# Patient Record
Sex: FEMALE | Race: WHITE | Employment: OTHER | RURAL
[De-identification: names, ages, dates, MRNs, and addresses within clinical notes are randomized per-mention and may not be internally consistent; named-entity substitution may affect disease eponyms.]

---

## 2017-02-21 ENCOUNTER — CLINICAL SUPPORT (OUTPATIENT)
Dept: FAMILY MEDICINE CLINIC | Age: 72
End: 2017-02-21

## 2017-02-21 DIAGNOSIS — E78.00 ELEVATED CHOLESTEROL: Primary | ICD-10-CM

## 2017-02-21 NOTE — MR AVS SNAPSHOT
Visit Information Date & Time Provider Department Dept. Phone Encounter #  
 2/21/2017 10:00 AM [facility] [phone] [id number] Your Appointments 2/21/2017 10:00 AM  
Nurse Visit with [facility] [facility] ([facility]) Appt Note: repeat chol  
 [address] [address]  
[address] [facility] [address] [address] Upcoming Health Maintenance Date Due DTaP/Tdap/Td series (1 - Tdap) 2/17/1965 BREAST CANCER SCRN MAMMOGRAM 2/17/1994 FOBT Q 1 YEAR AGE 50-75 2/17/1994 GLAUCOMA SCREENING Q2Y 2/17/2009 OSTEOPOROSIS SCREENING (DEXA) 2/17/2009 MEDICARE YEARLY EXAM 2/17/2009 Pneumococcal 65+ Low/Medium Risk (2 of 2 - PPSV23) 11/10/2017 Allergies as of 2/21/2017  Review Complete On: 2/21/2017 By: Daniel Perez Severity Noted Reaction Type Reactions Pcn [Penicillins]  02/09/2015    Unknown (comments) Current Immunizations  Reviewed on 11/10/2016 Name Date Influenza High Dose Vaccine PF 11/10/2016 Pneumococcal Conjugate (PCV-13) 11/10/2016 Not reviewed this visit You Were Diagnosed With   
  
 Codes Comments Elevated cholesterol    -  Primary ICD-10-CM: E78.00 ICD-9-CM: 272.0 Vitals OB Status Smoking Status Menopause Former Smoker Preferred Pharmacy Pharmacy Name Phone [id number] [address] [phone] Your Updated Medication List  
  
   
This list is accurate as of: 2/21/17  9:51 AM.  Always use your most recent med list.  
  
  
  
  
 ALEVE 220 mg Cap Generic drug:  naproxen sodium Take  by mouth. valACYclovir 500 mg tablet Commonly known as:  VALTREX Take 1 Tab by mouth two (2) times daily as needed. We Performed the Following LIPID PANEL [01870 CPT(R)] METABOLIC PANEL, COMPREHENSIVE [59319 CPT(R)] MT COLLECTION VENOUS BLOOD,VENIPUNCTURE [id number] CPT(R)] Introducing [facility]! Ambrocio Sanchez introduces [employer] patient portal. Now you can access parts of your medical record, email your doctor's office, and request medication refills online. 1. In your internet browser, go to [email or web address] 2. Click on the First Time User? Click Here link in the Sign In box. You will see the New Member Sign Up page. 3. Enter your [employer] Access Code exactly as it appears below. You will not need to use this code after youve completed the sign-up process. If you do not sign up before the expiration date, you must request a new code. · [employer] Access Code: [email or web address] Expires: 5/22/2017  9:42 AM 
 
4. Enter the last four digits of your Social Security Number (xxxx) and Date of Birth (mm/dd/yyyy) as indicated and click Submit. You will be taken to the next sign-up page. 5. Create a [employer] ID. This will be your [employer] login ID and cannot be changed, so think of one that is secure and easy to remember. 6. Create a [employer] password. You can change your password at any time. 7. Enter your Password Reset Question and Answer. This can be used at a later time if you forget your password. 8. Enter your e-mail address. You will receive e-mail notification when new information is available in [address] 9. Click Sign Up. You can now view and download portions of your medical record. 10. Click the Download Summary menu link to download a portable copy of your medical information. If you have questions, please visit the Frequently Asked Questions section of the [employer] website. Remember, [employer] is NOT to be used for urgent needs. For medical emergencies, dial 911. Now available from your iPhone and Android! Please provide this summary of care documentation to your next provider. Your primary care clinician is listed as Raphael Koch. If you have any questions after today's visit, please call [phone].

## 2017-02-22 LAB
ALBUMIN SERPL-MCNC: 4.6 G/DL (ref 3.5–4.8)
ALBUMIN/GLOB SERPL: 1.9 {RATIO} (ref 1.1–2.5)
ALP SERPL-CCNC: 75 IU/L (ref 39–117)
ALT SERPL-CCNC: 25 IU/L (ref 0–32)
AST SERPL-CCNC: 29 IU/L (ref 0–40)
BILIRUB SERPL-MCNC: 0.7 MG/DL (ref 0–1.2)
BUN SERPL-MCNC: 15 MG/DL (ref 8–27)
BUN/CREAT SERPL: 21 (ref 11–26)
CALCIUM SERPL-MCNC: 9.2 MG/DL (ref 8.7–10.3)
CHLORIDE SERPL-SCNC: 102 MMOL/L (ref 96–106)
CHOLEST SERPL-MCNC: 266 MG/DL (ref 100–199)
CO2 SERPL-SCNC: 25 MMOL/L (ref 18–29)
CREAT SERPL-MCNC: 0.7 MG/DL (ref 0.57–1)
GLOBULIN SER CALC-MCNC: 2.4 G/DL (ref 1.5–4.5)
GLUCOSE SERPL-MCNC: 92 MG/DL (ref 65–99)
HDLC SERPL-MCNC: 110 MG/DL
LDLC SERPL CALC-MCNC: 140 MG/DL (ref 0–99)
POTASSIUM SERPL-SCNC: 5.1 MMOL/L (ref 3.5–5.2)
PROT SERPL-MCNC: 7 G/DL (ref 6–8.5)
SODIUM SERPL-SCNC: 144 MMOL/L (ref 134–144)
TRIGL SERPL-MCNC: 80 MG/DL (ref 0–149)
VLDLC SERPL CALC-MCNC: 16 MG/DL (ref 5–40)

## 2017-03-06 ENCOUNTER — TELEPHONE (OUTPATIENT)
Dept: FAMILY MEDICINE CLINIC | Age: 72
End: 2017-03-06

## 2017-03-07 ENCOUNTER — OFFICE VISIT (OUTPATIENT)
Dept: FAMILY MEDICINE CLINIC | Age: 72
End: 2017-03-07

## 2017-03-07 VITALS
HEART RATE: 105 BPM | TEMPERATURE: 97.6 F | HEIGHT: 62 IN | DIASTOLIC BLOOD PRESSURE: 70 MMHG | WEIGHT: 123 LBS | BODY MASS INDEX: 22.63 KG/M2 | OXYGEN SATURATION: 96 % | SYSTOLIC BLOOD PRESSURE: 110 MMHG | RESPIRATION RATE: 18 BRPM

## 2017-03-07 DIAGNOSIS — J30.89 ENVIRONMENTAL AND SEASONAL ALLERGIES: ICD-10-CM

## 2017-03-07 DIAGNOSIS — J06.9 VIRAL UPPER RESPIRATORY TRACT INFECTION: Primary | ICD-10-CM

## 2017-03-07 DIAGNOSIS — R09.82 PND (POST-NASAL DRIP): ICD-10-CM

## 2017-03-07 RX ORDER — FLUTICASONE PROPIONATE 50 MCG
SPRAY, SUSPENSION (ML) NASAL
Qty: 1 BOTTLE | Refills: 5 | Status: SHIPPED | OUTPATIENT
Start: 2017-03-07

## 2017-03-07 RX ORDER — BENZONATATE 200 MG/1
200 CAPSULE ORAL
Qty: 30 CAP | Refills: 3 | Status: SHIPPED | OUTPATIENT
Start: 2017-03-07 | End: 2017-03-14

## 2017-03-07 RX ORDER — LORATADINE 10 MG/1
10 TABLET ORAL DAILY
Qty: 30 TAB | Refills: 4 | Status: SHIPPED | OUTPATIENT
Start: 2017-03-07 | End: 2022-05-27

## 2017-03-07 NOTE — PATIENT INSTRUCTIONS
Allergies: Care Instructions  Your Care Instructions  Allergies occur when your body's defense system (immune system) overreacts to certain substances. The immune system treats a harmless substance as if it were a harmful germ or virus. Many things can cause this overreaction, including pollens, medicine, food, dust, animal dander, and mold. Allergies can be mild or severe. Mild allergies can be managed with home treatment. But medicine may be needed to prevent problems. Managing your allergies is an important part of staying healthy. Your doctor may suggest that you have allergy testing to help find out what is causing your allergies. When you know what things trigger your symptoms, you can avoid them. This can prevent allergy symptoms and other health problems. For severe allergies that cause reactions that affect your whole body (anaphylactic reactions), your doctor may prescribe a shot of epinephrine to carry with you in case you have a severe reaction. Learn how to give yourself the shot and keep it with you at all times. Make sure it is not . Follow-up care is a key part of your treatment and safety. Be sure to make and go to all appointments, and call your doctor if you are having problems. It's also a good idea to know your test results and keep a list of the medicines you take. How can you care for yourself at home? · If you have been told by your doctor that dust or dust mites are causing your allergy, decrease the dust around your bed:  [facility] sheets, pillowcases, and other bedding in hot water every week. ¨ Use dust-proof covers for pillows, duvets, and mattresses. Avoid plastic covers because they tear easily and do not \"breathe. \" Wash as instructed on the label. ¨ Do not use any blankets and pillows that you do not need. ¨ Use blankets that you can wash in your washing machine. ¨ Consider removing drapes and carpets, which attract and hold dust, from your bedroom.   · If you are allergic to house dust and mites, do not use home humidifiers. Your doctor can suggest ways you can control dust and mites. · Look for signs of cockroaches. Cockroaches cause allergic reactions. Use cockroach baits to get rid of them. Then, clean your home well. Cockroaches like areas where grocery bags, newspapers, empty bottles, or cardboard boxes are stored. Do not keep these inside your home, and keep trash and food containers sealed. Seal off any spots where cockroaches might enter your home. · If you are allergic to mold, get rid of furniture, rugs, and drapes that smell musty. Check for mold in the bathroom. · If you are allergic to outdoor pollen or mold spores, use air-conditioning. Change or clean all filters every month. Keep windows closed. · If you are allergic to pollen, stay inside when pollen counts are high. Use a vacuum  with a HEPA filter or a double-thickness filter at least two times each week. · Stay inside when air pollution is bad. Avoid paint fumes, perfumes, and other strong odors. · Avoid conditions that make your allergies worse. Stay away from smoke. Do not smoke or let anyone else smoke in your house. Do not use fireplaces or wood-burning stoves. · If you are allergic to your pets, change the air filter in your furnace every month. Use high-efficiency filters. · If you are allergic to pet dander, keep pets outside or out of your bedroom. Old carpet and cloth furniture can hold a lot of animal dander. You may need to replace them. When should you call for help? Give an epinephrine shot if:  · You think you are having a severe allergic reaction. · You have symptoms in more than one body area, such as mild nausea and an itchy mouth. After giving an epinephrine shot call 911, even if you feel better. Call 911 if:  · You have symptoms of a severe allergic reaction. These may include:  ¨ Sudden raised, red areas (hives) all over your body.   ¨ Swelling of the throat, mouth, lips, or tongue. ¨ Trouble breathing. ¨ Passing out (losing consciousness). Or you may feel very lightheaded or suddenly feel weak, confused, or restless. · You have been given an epinephrine shot, even if you feel better. Call your doctor now or seek immediate medical care if:  · You have symptoms of an allergic reaction, such as:  ¨ A rash or hives (raised, red areas on the skin). ¨ Itching. ¨ Swelling. ¨ Belly pain, nausea, or vomiting. Watch closely for changes in your health, and be sure to contact your doctor if:  · You do not get better as expected. Where can you learn more? Go to http://darian-parag.info/. Enter R197 in the search box to learn more about \"Allergies: Care Instructions. \"  Current as of: February 12, 2016  Content Version: 11.1  © 2731-1974 [employer]. Care instructions adapted under license by [employer] (which disclaims liability or warranty for this information). If you have questions about a medical condition or this instruction, always ask your healthcare professional. [place] [address] any warranty or liability for your use of this information. Using a Nasal Steroid Spray: Care Instructions  Your Care Instructions    Your doctor may suggest using a corticosteroid nasal spray for your allergy symptoms or sinus problems. These sprays reduce the swelling inside the nose and sinuses. Unlike decongestant nasal sprays, steroid sprays won't lead to more swelling when you stop taking them. These sprays start working in a few days, but it may take several weeks before you get the full effect. Most side effects are minor. The most common complaint is a burning feeling in the nose right after the spray is used. Some people get nosebleeds. Follow-up care is a key part of your treatment and safety. Be sure to make and go to all appointments, and call your doctor if you are having problems.  It's also a good idea to know your test results and keep a list of the medicines you take. How can you care for yourself at home? Here are some tips for using these sprays:  · You may need to prime the sprayer before you use it. This means spraying it into the air a few times to make sure you get the right amount of medicine. Follow the directions on the label. · Blow your nose before you spray. This will help clear out your nostrils. · Gently sniff the medicine into your nose as you spray. Don't snort, or the medicine will go all the way into your throat where it won't do much good. · Aim the nozzle straight toward the back of your nose. This will help keep the medicine from irritating the inside walls of your nostril, especially your septum (the wall that separates your left and right nostrils). · Don't blow your nose for 10 minutes or so after you spray. And try not to sneeze. · Be safe with medicines. Use this medicine exactly as prescribed. Call your doctor if you think you are having a problem with your medicine. · Clean your sprayer once a week. Read the label to learn how. When should you call for help? Call your doctor now or seek immediate medical care if:  · You don't understand how to use the medicine. · Your symptoms aren't getting better as expected. · You think you are having a side effect from the medicine. Where can you learn more? Go to http://darian-parag.info/. Enter Z970 in the search box to learn more about \"Using a Nasal Steroid Spray: Care Instructions. \"  Current as of: July 29, 2016  Content Version: 11.1  © 0262-0901 [employer]. Care instructions adapted under license by [employer] (which disclaims liability or warranty for this information). If you have questions about a medical condition or this instruction, always ask your healthcare professional. [place] [address] any warranty or liability for your use of this information.        Upper Respiratory Infection (Cold): Care Instructions  Your Care Instructions    An upper respiratory infection, or URI, is an infection of the nose, sinuses, or throat. URIs are spread by coughs, sneezes, and direct contact. The common cold is the most frequent kind of URI. The flu and sinus infections are other kinds of URIs. Almost all URIs are caused by viruses. Antibiotics won't cure them. But you can treat most infections with home care. This may include drinking lots of fluids and taking over-the-counter pain medicine. You will probably feel better in 4 to 10 days. The doctor has checked you carefully, but problems can develop later. If you notice any problems or new symptoms, get medical treatment right away. Follow-up care is a key part of your treatment and safety. Be sure to make and go to all appointments, and call your doctor if you are having problems. It's also a good idea to know your test results and keep a list of the medicines you take. How can you care for yourself at home? · To prevent dehydration, drink plenty of fluids, enough so that your urine is light yellow or clear like water. Choose water and other caffeine-free clear liquids until you feel better. If you have kidney, heart, or liver disease and have to limit fluids, talk with your doctor before you increase the amount of fluids you drink. · Take an over-the-counter pain medicine, such as acetaminophen (Tylenol), ibuprofen (Advil, Motrin), or naproxen (Aleve). Read and follow all instructions on the label. · Before you use cough and cold medicines, check the label. These medicines may not be safe for young children or for people with certain health problems. · Be careful when taking over-the-counter cold or flu medicines and Tylenol at the same time. Many of these medicines have acetaminophen, which is Tylenol. Read the labels to make sure that you are not taking more than the recommended dose.  Too much acetaminophen (Tylenol) can be harmful. · Get plenty of rest.  · Do not smoke or allow others to smoke around you. If you need help quitting, talk to your doctor about stop-smoking programs and medicines. These can increase your chances of quitting for good. When should you call for help? Call 911 anytime you think you may need emergency care. For example, call if:  · You have severe trouble breathing. Call your doctor now or seek immediate medical care if:  · You seem to be getting much sicker. · You have new or worse trouble breathing. · You have a new or higher fever. · You have a new rash. Watch closely for changes in your health, and be sure to contact your doctor if:  · You have a new symptom, such as a sore throat, an earache, or sinus pain. · You cough more deeply or more often, especially if you notice more mucus or a change in the color of your mucus. · You do not get better as expected. Where can you learn more? Go to http://darian-parag.info/. Enter B601 in the search box to learn more about \"Upper Respiratory Infection (Cold): Care Instructions. \"  Current as of: June 30, 2016  Content Version: 11.1  © 6215-5652 [employer]. Care instructions adapted under license by [employer] (which disclaims liability or warranty for this information). If you have questions about a medical condition or this instruction, always ask your healthcare professional. [place] [address] any warranty or liability for your use of this information. Cholesterol and Triglycerides Tests: About These Tests  What are they? Cholesterol and triglycerides tests measure the amount of fats in your blood. These fats have both \"good\" (HDL) and \"bad\" (LDL) cholesterol. Why are these tests done? These tests are done to help find out your risk of a heart attack and stroke. They can help your doctor find out how well medicine is working for some health problems.   How can you prepare for these tests?  · Your doctor may tell you to fast before your tests. This means that you do not eat or drink anything except water for 9 to 12 hours before the tests. In most cases, you can take your medicines with water the morning of the test.  · Do not eat high-fat foods the night before the tests. · Do not drink alcohol or do intense exercise the night before the tests. · Be sure to tell your doctor about all the over-the-counter and prescription medicines and herbs or other supplements you take. They can affect the results of these tests. What happens during these tests? A health professional takes a sample of your blood. What else should you know about these tests? Your cholesterol levels can help your doctor find out your risk for having a heart attack or stroke. But it's not just about your cholesterol. Your doctor uses your cholesterol levels plus other things to calculate your risk. These include:  · Your blood pressure. · Whether or not you have diabetes. · Your age, sex, and race. · Whether or not you smoke. You and your doctor can talk about whether you need to lower your risk and what treatment is best for you. Where can you learn more? Go to http://darian-parag.info/. Enter Z294 in the search box to learn more about \"Cholesterol and Triglycerides Tests: About These Tests. \"  Current as of: June 30, 2016  Content Version: 11.1  © 1222-3820 [employer]. Care instructions adapted under license by [employer] (which disclaims liability or warranty for this information). If you have questions about a medical condition or this instruction, always ask your healthcare professional. [place] [address] any warranty or liability for your use of this information. Low HDL Cholesterol: After Your Visit  Your Care Instructions  Cholesterol is a type of fat in your blood. It is needed for many body functions, such as making new cells.  Cholesterol is made by your body and also comes from food you eat. HDL (high-density lipoprotein) is the \"good\" cholesterol. Low levels of HDL can increase your risk of having a heart attack or stroke. It is best if your HDL level is at least 40 (measured in milligrams per deciliter, or mg/dL). Low HDL usually is caused by a poor diet and a lack of exercise. You may raise your HDL level by eating less animal fat and more vegetables. Getting regular exercise can also help. But for some people, low HDL runs in the family. If changes in diet and exercise do not raise your HDL level, your doctor may recommend medicine. Follow-up care is a key part of your treatment and safety. Be sure to make and go to all appointments, and call your doctor if you are having problems. Its also a good idea to know your test results and keep a list of the medicines you take. How can you care for yourself at home? · Eat a healthy diet. Read food labels and try to avoid saturated fat and trans fat. ¨ Limit the amount of fatty meat and milk products you eat. Choose low-fat meats, such as skinless chicken breasts, and low-fat or nonfat dairy products. ¨ Bake, broil, grill, or steam foods instead of frying them. Avoid fried foods. ¨ Eat foods with whole grains, such as whole wheat bread, instead of white bread. Eat brown rice instead of white rice. ¨ Try not to eat liver and eggs. They contain a lot of \"bad\" cholesterol. Dianne Beagle with oils such as olive, canola, or peanut oil. ¨ Eat beans and peas for protein. · Try to lose weight if you are overweight. · Get regular exercise. Even mild regular exercise alone may increase your HDL level. Walking is a good choice. Bit by bit, increase the amount you walk every day. Try for at least 30 minutes on most days of the week. You also may want to swim, bike, or do other activities. · Stop smoking, which can lower your HDL level.  If you need help quitting, talk to your doctor about stop-smoking programs and medicines. These can increase your chances of quitting for good. · Take your medicines exactly as prescribed. Call your doctor if you think you are having a problem with your medicine. When should you call for help? Call 911 anytime you think you may need emergency care. For example, call if:  · You have signs of a stroke. These may include:  ¨ Sudden numbness, paralysis, or weakness in your face, arm, or leg, especially on only one side of your body. ¨ New problems with walking or balance. ¨ Sudden vision changes. ¨ Drooling or slurred speech. ¨ New problems speaking or understanding simple statements, or feeling confused. ¨ A sudden, severe headache that is different from past headaches. · You have symptoms of a heart attack. These may include:  ¨ Chest pain or pressure, or a strange feeling in the chest.  ¨ Sweating. ¨ Shortness of breath. ¨ Nausea or vomiting. ¨ Pain, pressure, or a strange feeling in the back, neck, jaw, or upper belly or in one or both shoulders or arms. ¨ Lightheadedness or sudden weakness. ¨ A fast or irregular heartbeat. After you call 911, the  may tell you to chew 1 adult-strength or 2 to 4 low-dose aspirin. Wait for an ambulance. Do not try to drive yourself. Watch closely for changes in your health, and be sure to contact your doctor if:  · Your HDL level does not increase after making diet and exercise changes. · You are worried about your cholesterol level. · You do not get better as expected. Where can you learn more? Go to [email or web address]  Enter L121 in the search box to learn more about \"Low HDL Cholesterol: After Your Visit. \"   © 2857-5111 Healthwise, Incorporated. Care instructions adapted under license by [facility] [employer] (which disclaims liability or warranty for this information).  This care instruction is for use with your licensed healthcare professional. If you have questions about a medical condition or this instruction, always ask your healthcare professional. [place] [address] any warranty or liability for your use of this information. Content Version: 8.8.648345; Last Revised: October 13, 2011              Hyperlipidemia: After Your Visit  Your Care Instructions  Hyperlipidemia is too much fat in your blood. The body has several kinds of fat, including cholesterol and triglycerides. Your body needs fat for many things, such as making new cells. But too much fat in your blood increases your chances of having a heart attack or stroke. You may be able to lower your cholesterol and triglycerides with a heart-healthy diet, exercise, and if needed, medicine. Your doctor may want you to try lifestyle changes first to see whether they lower the fat in your blood. You may need to take medicine if lifestyle changes do not lower the fat in your blood enough. Follow-up care is a key part of your treatment and safety. Be sure to make and go to all appointments, and call your doctor if you are having problems. Its also a good idea to know your test results and keep a list of the medicines you take. How can you care for yourself at home? Take your medicines  · Take your medicines exactly as prescribed. Call your doctor if you think you are having a problem with your medicine. · If you take medicine to lower your cholesterol, go to follow-up visits. You will need to have blood tests. · Do not take large doses of niacin, which is a B vitamin, while taking medicine called statins. It may increase the chance of muscle pain and liver problems. · Talk to your doctor about avoiding grapefruit juice if you are taking statins. Grapefruit juice can raise the level of this medicine in your blood. This could increase side effects. Eat more fruits, vegetables, and fiber  · Fruits and vegetables have lots of nutrients that help protect against heart disease, and they have littleif anyfat. Try to eat at least five servings a day. Dark green, deep orange, or yellow fruits and vegetables are healthy choices. · Keep carrots, celery, and other veggies handy for snacks. Buy fruit that is in season and store it where you can see it so that you will be tempted to eat it. Cook dishes that have a lot of veggies in them, such as stir-fries and soups. · Foods high in fiber may reduce your cholesterol and provide important vitamins and minerals. High-fiber foods include whole-grain cereals and breads, oatmeal, beans, brown rice, citrus fruits, and apples. · Buy whole-grain breads and cereals instead of white bread and pastries. Limit saturated fat  · Read food labels and try to avoid saturated fat and trans fat. They increase your risk of heart disease. · Use olive or canola oil when you cook. Try cholesterol-lowering spreads, such as Benecol or Take Control. · Bake, broil, grill, or steam foods instead of frying them. · Limit the amount of high-fat meats you eat, including hot dogs and sausages. Cut out all visible fat when you prepare meat. · Eat fish, skinless poultry, and soy products such as tofu instead of high-fat meats. Soybeans may be especially good for your heart. Eat at least two servings of fish a week. Certain fish, such as salmon, contain omega-3 fatty acids, which may help reduce your risk of heart attack. · Choose low-fat or fat-free milk and dairy products. Get exercise, limit alcohol, and quit smoking  · Get more exercise. Work with your doctor to set up an exercise program. Even if you can do only a small amount, exercise will help you get stronger, have more energy, and manage your weight and your stress. Walking is an easy way to get exercise. Gradually increase the amount you walk every day. Aim for at least 30 minutes on most days of the week. You also may want to swim, bike, or do other activities. · Limit alcohol to no more than 2 drinks a day for men and 1 drink a day for women. · Do not smoke.  If you need help quitting, talk to your doctor about stop-smoking programs and medicines. These can increase your chances of quitting for good. When should you call for help? Call 911 anytime you think you may need emergency care. For example, call if:  · You have symptoms of a heart attack. These may include:  ¨ Chest pain or pressure, or a strange feeling in the chest.  ¨ Sweating. ¨ Shortness of breath. ¨ Nausea or vomiting. ¨ Pain, pressure, or a strange feeling in the back, neck, jaw, or upper belly or in one or both shoulders or arms. ¨ Lightheadedness or sudden weakness. ¨ A fast or irregular heartbeat. After you call 911, the  may tell you to chew 1 adult-strength or 2 to 4 low-dose aspirin. Wait for an ambulance. Do not try to drive yourself. · You have signs of a stroke. These may include:  ¨ Sudden numbness, paralysis, or weakness in your face, arm, or leg, especially on only one side of your body. ¨ New problems with walking or balance. ¨ Sudden vision changes. ¨ Drooling or slurred speech. ¨ New problems speaking or understanding simple statements, or feeling confused. ¨ A sudden, severe headache that is different from past headaches. · You passed out (lost consciousness). Call your doctor now or seek immediate medical care if:  · You have muscle pain or weakness. Watch closely for changes in your health, and be sure to contact your doctor if:  · You are very tired. · You have an upset stomach, gas, constipation, or belly pain or cramps. Where can you learn more? Go to [email or web address]  Enter C406 in the search box to learn more about \"Hyperlipidemia: After Your Visit. \"   © 1184-3905 Healthwise, Incorporated. Care instructions adapted under license by [employer] (which disclaims liability or warranty for this information).  This care instruction is for use with your licensed healthcare professional. If you have questions about a medical condition or this instruction, always ask your healthcare professional. [address] any warranty or liability for your use of this information. Content Version: 7.2.012644; Last Revised: October 13, 2011                 Learning About High Cholesterol  What is high cholesterol? Cholesterol is a type of fat in your blood. It is needed for many body functions, such as making new cells. Cholesterol is made by your body. It also comes from food you eat. If you have too much cholesterol, it starts to build up in your arteries. This is called hardening of the arteries, or atherosclerosis. High cholesterol raises your risk of a heart attack and stroke. There are different types of cholesterol. LDL is the \"bad\" cholesterol. High LDL can raise your risk for heart disease, heart attack, and stroke. HDL is the \"good\" cholesterol. High HDL is linked with a lower risk for heart disease, heart attack, and stroke. Your cholesterol levels help your doctor find out your risk for having a heart attack or stroke. How can you prevent high cholesterol? A heart-healthy lifestyle can help you prevent high cholesterol. This lifestyle helps lower your risk for a heart attack and stroke. · Eat heart-healthy foods. ¨ Eat fruits, vegetables, whole grains (like oatmeal), dried beans and peas, nuts and seeds, soy products (like tofu), and fat-free or low-fat dairy products. ¨ Replace butter, margarine, and hydrogenated or partially hydrogenated oils with olive and canola oils. (Canola oil margarine without trans fat is fine.)  ¨ Replace red meat with fish, poultry, and soy protein (like tofu). ¨ Limit processed and packaged foods like chips, crackers, and cookies. · Be active. Exercise can improve your cholesterol level. Get at least 30 minutes of exercise on most days of the week. Walking is a good choice. You also may want to do other activities, such as running, swimming, cycling, or playing tennis or team sports.   · Stay at a healthy weight. Lose weight if you need to. · Don't smoke. If you need help quitting, talk to your doctor about stop-smoking programs and medicines. These can increase your chances of quitting for good. How is high cholesterol treated? The goal of treatment is to reduce your chances of having a heart attack or stroke. The goal is not to lower your cholesterol numbers only. · You may make lifestyle changes, such as eating healthy foods, not smoking, losing weight, and being more active. · You may have to take medicine. Follow-up care is a key part of your treatment and safety. Be sure to make and go to all appointments, and call your doctor if you are having problems. It's also a good idea to know your test results and keep a list of the medicines you take. Where can you learn more? Go to http://darian-parag.info/. Enter R818 in the search box to learn more about \"Learning About High Cholesterol. \"  Current as of: January 27, 2016  Content Version: 11.1  © 9598-0943 [employer]. Care instructions adapted under license by [employer] (which disclaims liability or warranty for this information). If you have questions about a medical condition or this instruction, always ask your healthcare professional. [place] [address] any warranty or liability for your use of this information.

## 2017-03-07 NOTE — MR AVS SNAPSHOT
Visit Information Date & Time Provider Department Dept. Phone Encounter #  
 3/7/2017 10:30 AM Isadora Astudillo NP [address] [phone] [id number] Follow-up Instructions Return if symptoms worsen or fail to improve. Upcoming Health Maintenance Date Due DTaP/Tdap/Td series (1 - Tdap) 2/17/1965 BREAST CANCER SCRN MAMMOGRAM 2/17/1994 FOBT Q 1 YEAR AGE 50-75 2/17/1994 GLAUCOMA SCREENING Q2Y 2/17/2009 OSTEOPOROSIS SCREENING (DEXA) 2/17/2009 MEDICARE YEARLY EXAM 2/17/2009 Pneumococcal 65+ Low/Medium Risk (2 of 2 - PPSV23) 11/10/2017 Allergies as of 3/7/2017  Review Complete On: 3/7/2017 By: Isadora Astudillo NP Severity Noted Reaction Type Reactions Pcn [Penicillins]  02/09/2015    Unknown (comments) Current Immunizations  Reviewed on 11/10/2016 Name Date Influenza High Dose Vaccine PF 11/10/2016 Pneumococcal Conjugate (PCV-13) 11/10/2016 Not reviewed this visit You Were Diagnosed With   
  
 Codes Comments Viral upper respiratory tract infection    -  Primary ICD-10-CM: J06.9, B97.89 ICD-9-CM: 465.9 PND (post-nasal drip)     ICD-10-CM: R09.82 ICD-9-CM: 784.91 Environmental and seasonal allergies     ICD-10-CM: J30.89 ICD-9-CM: 477.8 Vitals BP Pulse Temp Resp Height(growth percentile) 110/70 (BP 1 Location: Left arm, BP Patient Position: Sitting) (!) 105 97.6 °F (36.4 °C) (Temporal) 18 5' 2\" (1.575 m) Weight(growth percentile) SpO2 BMI OB Status Smoking Status 123 lb (55.8 kg) 96% 22.5 kg/m2 Menopause Former Smoker BMI and BSA Data Body Mass Index Body Surface Area  
 22.5 kg/m 2 1.56 m 2 Preferred Pharmacy Pharmacy Name Phone [id number], [address] [place] [phone] Your Updated Medication List  
  
   
This list is accurate as of: 3/7/17 10:56 AM.  Always use your most recent med list.  
  
  
  
  
 ALEVE 220 mg Cap Generic drug:  naproxen sodium Take  by mouth.  
  
 benzonatate 200 mg capsule Commonly known as:  TESSALON Take 1 Cap by mouth three (3) times daily as needed for Cough for up to 7 days. fluticasone 50 mcg/actuation nasal spray Commonly known as:  Oksana Fryer One spray per nostril twice a day  Indications: ALLERGIC RHINITIS  
  
 loratadine 10 mg tablet Commonly known as:  Aundrea Dustman Take 1 Tab by mouth daily. Indications: ALLERGIC RHINITIS  
  
 valACYclovir 500 mg tablet Commonly known as:  VALTREX Take 1 Tab by mouth two (2) times daily as needed. Prescriptions Sent to Pharmacy Refills  
 loratadine (CLARITIN) 10 mg tablet 4 Sig: Take 1 Tab by mouth daily. Indications: ALLERGIC RHINITIS Class: Normal  
 Pharmacy: [facility][id number] [place], [address] Ph #: [phone] Route: Oral  
 fluticasone (FLONASE) 50 mcg/actuation nasal spray 5 Sig: One spray per nostril twice a day  Indications: ALLERGIC RHINITIS Class: Normal  
 Pharmacy: [facility]/pharmacy #[id number] - [place] - [address] Ph #: [phone]  
 benzonatate (TESSALON) 200 mg capsule 3 Sig: Take 1 Cap by mouth three (3) times daily as needed for Cough for up to 7 days. Class: Normal  
 Pharmacy: [facility] #[id number] [place], [address] Ph #: [phone] Route: Oral  
  
Follow-up Instructions Return if symptoms worsen or fail to improve. Patient Instructions Allergies: Care Instructions Your Care Instructions Allergies occur when your body's defense system (immune system) overreacts to certain substances. The immune system treats a harmless substance as if it were a harmful germ or virus. Many things can cause this overreaction, including pollens, medicine, food, dust, animal dander, and mold. Allergies can be mild or severe.  Mild allergies can be managed with home treatment. But medicine may be needed to prevent problems. Managing your allergies is an important part of staying healthy. Your doctor may suggest that you have allergy testing to help find out what is causing your allergies. When you know what things trigger your symptoms, you can avoid them. This can prevent allergy symptoms and other health problems. For severe allergies that cause reactions that affect your whole body (anaphylactic reactions), your doctor may prescribe a shot of epinephrine to carry with you in case you have a severe reaction. Learn how to give yourself the shot and keep it with you at all times. Make sure it is not . Follow-up care is a key part of your treatment and safety. Be sure to make and go to all appointments, and call your doctor if you are having problems. It's also a good idea to know your test results and keep a list of the medicines you take. How can you care for yourself at home? · If you have been told by your doctor that dust or dust mites are causing your allergy, decrease the dust around your bed: 
[facility] [employer] sheets, pillowcases, and other bedding in hot water every week. ¨ Use dust-proof covers for pillows, duvets, and mattresses. Avoid plastic covers because they tear easily and do not \"breathe. \" Wash as instructed on the label. ¨ Do not use any blankets and pillows that you do not need. ¨ Use blankets that you can wash in your washing machine. ¨ Consider removing drapes and carpets, which attract and hold dust, from your bedroom. · If you are allergic to house dust and mites, do not use home humidifiers. Your doctor can suggest ways you can control dust and mites. · Look for signs of cockroaches. Cockroaches cause allergic reactions. Use cockroach baits to get rid of them. Then, clean your home well. Cockroaches like areas where grocery bags, newspapers, empty bottles, or cardboard boxes are stored.  Do not keep these inside your home, and keep trash and food containers sealed. Seal off any spots where cockroaches might enter your home. · If you are allergic to mold, get rid of furniture, rugs, and drapes that smell musty. Check for mold in the bathroom. · If you are allergic to outdoor pollen or mold spores, use air-conditioning. Change or clean all filters every month. Keep windows closed. · If you are allergic to pollen, stay inside when pollen counts are high. Use a vacuum  with a HEPA filter or a double-thickness filter at least two times each week. · Stay inside when air pollution is bad. Avoid paint fumes, perfumes, and other strong odors. · Avoid conditions that make your allergies worse. Stay away from smoke. Do not smoke or let anyone else smoke in your house. Do not use fireplaces or wood-burning stoves. · If you are allergic to your pets, change the air filter in your furnace every month. Use high-efficiency filters. · If you are allergic to pet dander, keep pets outside or out of your bedroom. Old carpet and cloth furniture can hold a lot of animal dander. You may need to replace them. When should you call for help? Give an epinephrine shot if: 
· You think you are having a severe allergic reaction. · You have symptoms in more than one body area, such as mild nausea and an itchy mouth. After giving an epinephrine shot call 911, even if you feel better. Call 911 if: 
· You have symptoms of a severe allergic reaction. These may include: 
¨ Sudden raised, red areas (hives) all over your body. ¨ Swelling of the throat, mouth, lips, or tongue. ¨ Trouble breathing. ¨ Passing out (losing consciousness). Or you may feel very lightheaded or suddenly feel weak, confused, or restless. · You have been given an epinephrine shot, even if you feel better. Call your doctor now or seek immediate medical care if: 
· You have symptoms of an allergic reaction, such as: ¨ A rash or hives (raised, red areas on the skin). ¨ Itching. ¨ Swelling. ¨ Belly pain, nausea, or vomiting. Watch closely for changes in your health, and be sure to contact your doctor if: 
· You do not get better as expected. Where can you learn more? Go to http://darian-parag.info/. Enter W157 in the search box to learn more about \"Allergies: Care Instructions. \" Current as of: February 12, 2016 Content Version: 11.1 © 6330-3291 SeeYourImpact.org. Care instructions adapted under license by [employer]wi (which disclaims liability or warranty for this information). If you have questions about a medical condition or this instruction, always ask your healthcare professional. [address] any warranty or liability for your use of this information. Using a Nasal Steroid Spray: Care Instructions Your Care Instructions Your doctor may suggest using a corticosteroid nasal spray for your allergy symptoms or sinus problems. These sprays reduce the swelling inside the nose and sinuses. Unlike decongestant nasal sprays, steroid sprays won't lead to more swelling when you stop taking them. These sprays start working in a few days, but it may take several weeks before you get the full effect. Most side effects are minor. The most common complaint is a burning feeling in the nose right after the spray is used. Some people get nosebleeds. Follow-up care is a key part of your treatment and safety. Be sure to make and go to all appointments, and call your doctor if you are having problems. It's also a good idea to know your test results and keep a list of the medicines you take. How can you care for yourself at home? Here are some tips for using these sprays: 
· You may need to prime the sprayer before you use it. This means spraying it into the air a few times to make sure you get the right amount of medicine. Follow the directions on the label. · Blow your nose before you spray. This will help clear out your nostrils. · Gently sniff the medicine into your nose as you spray. Don't snort, or the medicine will go all the way into your throat where it won't do much good. · Aim the nozzle straight toward the back of your nose. This will help keep the medicine from irritating the inside walls of your nostril, especially your septum (the wall that separates your left and right nostrils). · Don't blow your nose for 10 minutes or so after you spray. And try not to sneeze. · Be safe with medicines. Use this medicine exactly as prescribed. Call your doctor if you think you are having a problem with your medicine. · Clean your sprayer once a week. Read the label to learn how. When should you call for help? Call your doctor now or seek immediate medical care if: 
· You don't understand how to use the medicine. · Your symptoms aren't getting better as expected. · You think you are having a side effect from the medicine. Where can you learn more? Go to http://darian-parag.info/. Enter M742 in the search box to learn more about \"Using a Nasal Steroid Spray: Care Instructions. \" Current as of: July 29, 2016 Content Version: 11.1 © 3230-9920 Vir-Sec. Care instructions adapted under license by [employer] (which disclaims liability or warranty for this information). If you have questions about a medical condition or this instruction, always ask your healthcare professional. [place] [address] any warranty or liability for your use of this information. Upper Respiratory Infection (Cold): Care Instructions Your Care Instructions An upper respiratory infection, or URI, is an infection of the nose, sinuses, or throat. URIs are spread by coughs, sneezes, and direct contact. The common cold is the most frequent kind of URI. The flu and sinus infections are other kinds of URIs. Almost all URIs are caused by viruses. Antibiotics won't cure them. But you can treat most infections with home care. This may include drinking lots of fluids and taking over-the-counter pain medicine. You will probably feel better in 4 to 10 days. The doctor has checked you carefully, but problems can develop later. If you notice any problems or new symptoms, get medical treatment right away. Follow-up care is a key part of your treatment and safety. Be sure to make and go to all appointments, and call your doctor if you are having problems. It's also a good idea to know your test results and keep a list of the medicines you take. How can you care for yourself at home? · To prevent dehydration, drink plenty of fluids, enough so that your urine is light yellow or clear like water. Choose water and other caffeine-free clear liquids until you feel better. If you have kidney, heart, or liver disease and have to limit fluids, talk with your doctor before you increase the amount of fluids you drink. · Take an over-the-counter pain medicine, such as acetaminophen (Tylenol), ibuprofen (Advil, Motrin), or naproxen (Aleve). Read and follow all instructions on the label. · Before you use cough and cold medicines, check the label. These medicines may not be safe for young children or for people with certain health problems. · Be careful when taking over-the-counter cold or flu medicines and Tylenol at the same time. Many of these medicines have acetaminophen, which is Tylenol. Read the labels to make sure that you are not taking more than the recommended dose. Too much acetaminophen (Tylenol) can be harmful. · Get plenty of rest. 
· Do not smoke or allow others to smoke around you. If you need help quitting, talk to your doctor about stop-smoking programs and medicines. These can increase your chances of quitting for good. When should you call for help? Call 911 anytime you think you may need emergency care. For example, call if: 
· You have severe trouble breathing. Call your doctor now or seek immediate medical care if: 
· You seem to be getting much sicker. · You have new or worse trouble breathing. · You have a new or higher fever. · You have a new rash. Watch closely for changes in your health, and be sure to contact your doctor if: 
· You have a new symptom, such as a sore throat, an earache, or sinus pain. · You cough more deeply or more often, especially if you notice more mucus or a change in the color of your mucus. · You do not get better as expected. Where can you learn more? Go to http://darian-parag.info/. Enter U203 in the search box to learn more about \"Upper Respiratory Infection (Cold): Care Instructions. \" Current as of: June 30, 2016 Content Version: 11.1 © 9913-0551 AwesomeTouch. Care instructions adapted under license by LoLo (which disclaims liability or warranty for this information). If you have questions about a medical condition or this instruction, always ask your healthcare professional. [place] [address] any warranty or liability for your use of this information. Cholesterol and Triglycerides Tests: About These Tests What are they? Cholesterol and triglycerides tests measure the amount of fats in your blood. These fats have both \"good\" (HDL) and \"bad\" (LDL) cholesterol. Why are these tests done? These tests are done to help find out your risk of a heart attack and stroke. They can help your doctor find out how well medicine is working for some health problems. How can you prepare for these tests? · Your doctor may tell you to fast before your tests. This means that you do not eat or drink anything except water for 9 to 12 hours before the tests.  In most cases, you can take your medicines with water the morning of the test. 
 · Do not eat high-fat foods the night before the tests. · Do not drink alcohol or do intense exercise the night before the tests. · Be sure to tell your doctor about all the over-the-counter and prescription medicines and herbs or other supplements you take. They can affect the results of these tests. What happens during these tests? A health professional takes a sample of your blood. What else should you know about these tests? Your cholesterol levels can help your doctor find out your risk for having a heart attack or stroke. But it's not just about your cholesterol. Your doctor uses your cholesterol levels plus other things to calculate your risk. These include: 
· Your blood pressure. · Whether or not you have diabetes. · Your age, sex, and race. · Whether or not you smoke. You and your doctor can talk about whether you need to lower your risk and what treatment is best for you. Where can you learn more? Go to http://darian-parag.info/. Enter H728 in the search box to learn more about \"Cholesterol and Triglycerides Tests: About These Tests. \" Current as of: June 30, 2016 Content Version: 11.1 © 8601-9643 Annelutfen.com, Incorporated. Care instructions adapted under license by WHILL (which disclaims liability or warranty for this information). If you have questions about a medical condition or this instruction, always ask your healthcare professional. [place] [address] any warranty or liability for your use of this information. Low HDL Cholesterol: After Your Visit Your Care Instructions Cholesterol is a type of fat in your blood. It is needed for many body functions, such as making new cells. Cholesterol is made by your body and also comes from food you eat. HDL (high-density lipoprotein) is the \"good\" cholesterol.  Low levels of HDL can increase your risk of having a heart attack or stroke. It is best if your HDL level is at least 40 (measured in milligrams per deciliter, or mg/dL). Low HDL usually is caused by a poor diet and a lack of exercise. You may raise your HDL level by eating less animal fat and more vegetables. Getting regular exercise can also help. But for some people, low HDL runs in the family. If changes in diet and exercise do not raise your HDL level, your doctor may recommend medicine. Follow-up care is a key part of your treatment and safety. Be sure to make and go to all appointments, and call your doctor if you are having problems. Its also a good idea to know your test results and keep a list of the medicines you take. How can you care for yourself at home? · Eat a healthy diet. Read food labels and try to avoid saturated fat and trans fat. ¨ Limit the amount of fatty meat and milk products you eat. Choose low-fat meats, such as skinless chicken breasts, and low-fat or nonfat dairy products. ¨ Bake, broil, grill, or steam foods instead of frying them. Avoid fried foods. ¨ Eat foods with whole grains, such as whole wheat bread, instead of white bread. Eat brown rice instead of white rice. ¨ Try not to eat liver and eggs. They contain a lot of \"bad\" cholesterol. Ronold Garret with oils such as olive, canola, or peanut oil. ¨ Eat beans and peas for protein. · Try to lose weight if you are overweight. · Get regular exercise. Even mild regular exercise alone may increase your HDL level. Walking is a good choice. Bit by bit, increase the amount you walk every day. Try for at least 30 minutes on most days of the week. You also may want to swim, bike, or do other activities. · Stop smoking, which can lower your HDL level. If you need help quitting, talk to your doctor about stop-smoking programs and medicines. These can increase your chances of quitting for good. · Take your medicines exactly as prescribed.  Call your doctor if you think you are having a problem with your medicine. When should you call for help? Call 911 anytime you think you may need emergency care. For example, call if: 
· You have signs of a stroke. These may include: 
¨ Sudden numbness, paralysis, or weakness in your face, arm, or leg, especially on only one side of your body. ¨ New problems with walking or balance. ¨ Sudden vision changes. ¨ Drooling or slurred speech. ¨ New problems speaking or understanding simple statements, or feeling confused. ¨ A sudden, severe headache that is different from past headaches. · You have symptoms of a heart attack. These may include: ¨ Chest pain or pressure, or a strange feeling in the chest. 
¨ Sweating. ¨ Shortness of breath. ¨ Nausea or vomiting. ¨ Pain, pressure, or a strange feeling in the back, neck, jaw, or upper belly or in one or both shoulders or arms. ¨ Lightheadedness or sudden weakness. ¨ A fast or irregular heartbeat. After you call 911, the  may tell you to chew 1 adult-strength or 2 to 4 low-dose aspirin. Wait for an ambulance. Do not try to drive yourself. Watch closely for changes in your health, and be sure to contact your doctor if: 
· Your HDL level does not increase after making diet and exercise changes. · You are worried about your cholesterol level. · You do not get better as expected. Where can you learn more? Go to 12Return.be Enter L121 in the search box to learn more about \"Low HDL Cholesterol: After Your Visit. \"  
© 8757-6504 Healthwise, Incorporated. Care instructions adapted under license by [employer] (which disclaims liability or warranty for this information). This care instruction is for use with your licensed healthcare professional. If you have questions about a medical condition or this instruction, always ask your healthcare professional. [address] any warranty or liability for your use of this information. Content Version: 3.1.382073; Last Revised: October 13, 2011 Hyperlipidemia: After Your Visit Your Care Instructions Hyperlipidemia is too much fat in your blood. The body has several kinds of fat, including cholesterol and triglycerides. Your body needs fat for many things, such as making new cells. But too much fat in your blood increases your chances of having a heart attack or stroke. You may be able to lower your cholesterol and triglycerides with a heart-healthy diet, exercise, and if needed, medicine. Your doctor may want you to try lifestyle changes first to see whether they lower the fat in your blood. You may need to take medicine if lifestyle changes do not lower the fat in your blood enough. Follow-up care is a key part of your treatment and safety. Be sure to make and go to all appointments, and call your doctor if you are having problems. Its also a good idea to know your test results and keep a list of the medicines you take. How can you care for yourself at home? Take your medicines · Take your medicines exactly as prescribed. Call your doctor if you think you are having a problem with your medicine. · If you take medicine to lower your cholesterol, go to follow-up visits. You will need to have blood tests. · Do not take large doses of niacin, which is a B vitamin, while taking medicine called statins. It may increase the chance of muscle pain and liver problems. · Talk to your doctor about avoiding grapefruit juice if you are taking statins. Grapefruit juice can raise the level of this medicine in your blood. This could increase side effects. Eat more fruits, vegetables, and fiber · Fruits and vegetables have lots of nutrients that help protect against heart disease, and they have littleif anyfat. Try to eat at least five servings a day. Dark green, deep orange, or yellow fruits and vegetables are healthy choices. · Keep carrots, celery, and other veggies handy for snacks. Buy fruit that is in season and store it where you can see it so that you will be tempted to eat it. Cook dishes that have a lot of veggies in them, such as stir-fries and soups. · Foods high in fiber may reduce your cholesterol and provide important vitamins and minerals. High-fiber foods include whole-grain cereals and breads, oatmeal, beans, brown rice, citrus fruits, and apples. · Buy whole-grain breads and cereals instead of white bread and pastries. Limit saturated fat · Read food labels and try to avoid saturated fat and trans fat. They increase your risk of heart disease. · Use olive or canola oil when you cook. Try cholesterol-lowering spreads, such as Benecol or Take Control. · Bake, broil, grill, or steam foods instead of frying them. · Limit the amount of high-fat meats you eat, including hot dogs and sausages. Cut out all visible fat when you prepare meat. · Eat fish, skinless poultry, and soy products such as tofu instead of high-fat meats. Soybeans may be especially good for your heart. Eat at least two servings of fish a week. Certain fish, such as salmon, contain omega-3 fatty acids, which may help reduce your risk of heart attack. · Choose low-fat or fat-free milk and dairy products. Get exercise, limit alcohol, and quit smoking · Get more exercise. Work with your doctor to set up an exercise program. Even if you can do only a small amount, exercise will help you get stronger, have more energy, and manage your weight and your stress. Walking is an easy way to get exercise. Gradually increase the amount you walk every day. Aim for at least 30 minutes on most days of the week. You also may want to swim, bike, or do other activities. · Limit alcohol to no more than 2 drinks a day for men and 1 drink a day for women. · Do not smoke.  If you need help quitting, talk to your doctor about stop-smoking programs and medicines. These can increase your chances of quitting for good. When should you call for help? Call 911 anytime you think you may need emergency care. For example, call if: 
· You have symptoms of a heart attack. These may include: ¨ Chest pain or pressure, or a strange feeling in the chest. 
¨ Sweating. ¨ Shortness of breath. ¨ Nausea or vomiting. ¨ Pain, pressure, or a strange feeling in the back, neck, jaw, or upper belly or in one or both shoulders or arms. ¨ Lightheadedness or sudden weakness. ¨ A fast or irregular heartbeat. After you call 911, the  may tell you to chew 1 adult-strength or 2 to 4 low-dose aspirin. Wait for an ambulance. Do not try to drive yourself. · You have signs of a stroke. These may include: 
¨ Sudden numbness, paralysis, or weakness in your face, arm, or leg, especially on only one side of your body. ¨ New problems with walking or balance. ¨ Sudden vision changes. ¨ Drooling or slurred speech. ¨ New problems speaking or understanding simple statements, or feeling confused. ¨ A sudden, severe headache that is different from past headaches. · You passed out (lost consciousness). Call your doctor now or seek immediate medical care if: 
· You have muscle pain or weakness. Watch closely for changes in your health, and be sure to contact your doctor if: 
· You are very tired. · You have an upset stomach, gas, constipation, or belly pain or cramps. Where can you learn more? Go to Lendinero.be Enter C406 in the search box to learn more about \"Hyperlipidemia: After Your Visit. \"  
© 6689-0015 Healthwise, Incorporated. Care instructions adapted under license by Ashia Adkins (which disclaims liability or warranty for this information).  This care instruction is for use with your licensed healthcare professional. If you have questions about a medical condition or this instruction, always ask your healthcare professional. [address] any warranty or liability for your use of this information. Content Version: 3.8.004069; Last Revised: October 13, 2011 Learning About High Cholesterol What is high cholesterol? Cholesterol is a type of fat in your blood. It is needed for many body functions, such as making new cells. Cholesterol is made by your body. It also comes from food you eat. If you have too much cholesterol, it starts to build up in your arteries. This is called hardening of the arteries, or atherosclerosis. High cholesterol raises your risk of a heart attack and stroke. There are different types of cholesterol. LDL is the \"bad\" cholesterol. High LDL can raise your risk for heart disease, heart attack, and stroke. HDL is the \"good\" cholesterol. High HDL is linked with a lower risk for heart disease, heart attack, and stroke. Your cholesterol levels help your doctor find out your risk for having a heart attack or stroke. How can you prevent high cholesterol? A heart-healthy lifestyle can help you prevent high cholesterol. This lifestyle helps lower your risk for a heart attack and stroke. · Eat heart-healthy foods. ¨ Eat fruits, vegetables, whole grains (like oatmeal), dried beans and peas, nuts and seeds, soy products (like tofu), and fat-free or low-fat dairy products. ¨ Replace butter, margarine, and hydrogenated or partially hydrogenated oils with olive and canola oils. (Canola oil margarine without trans fat is fine.) ¨ Replace red meat with fish, poultry, and soy protein (like tofu). ¨ Limit processed and packaged foods like chips, crackers, and cookies. · Be active. Exercise can improve your cholesterol level. Get at least 30 minutes of exercise on most days of the week. Walking is a good choice. You also may want to do other activities, such as running, swimming, cycling, or playing tennis or team sports. · Stay at a healthy weight. Lose weight if you need to. · Don't smoke. If you need help quitting, talk to your doctor about stop-smoking programs and medicines. These can increase your chances of quitting for good. How is high cholesterol treated? The goal of treatment is to reduce your chances of having a heart attack or stroke. The goal is not to lower your cholesterol numbers only. · You may make lifestyle changes, such as eating healthy foods, not smoking, losing weight, and being more active. · You may have to take medicine. Follow-up care is a key part of your treatment and safety. Be sure to make and go to all appointments, and call your doctor if you are having problems. It's also a good idea to know your test results and keep a list of the medicines you take. Where can you learn more? Go to http://darian-parag.info/. Enter R031 in the search box to learn more about \"Learning About High Cholesterol. \" Current as of: January 27, 2016 Content Version: 11.1 © 0899-6109 Healthwise, Incorporated. Care instructions adapted under license by CSRware (which disclaims liability or warranty for this information). If you have questions about a medical condition or this instruction, always ask your healthcare professional. [address] any warranty or liability for your use of this information. Introducing [facility] HE[facility]! Jose Manuel Zavala introduces MedWhat patient portal. Now you can access parts of your medical record, email your doctor's office, and request medication refills online. 1. In your internet browser, go to [email or web address] 2. Click on the First Time User? Click Here link in the Sign In box. You will see the New Member Sign Up page. 3. Enter your MedWhat Access Code exactly as it appears below. You will not need to use this code after youve completed the sign-up process.  If you do not sign up before the expiration date, you must request a new code. · Deskarma Access Code: [email or web address] Expires: 5/22/2017  9:42 AM 
 
4. Enter the last four digits of your Social Security Number (xxxx) and Date of Birth (mm/dd/yyyy) as indicated and click Submit. You will be taken to the next sign-up page. 5. Create a Deskarma ID. This will be your Deskarma login ID and cannot be changed, so think of one that is secure and easy to remember. 6. Create a Deskarma password. You can change your password at any time. 7. Enter your Password Reset Question and Answer. This can be used at a later time if you forget your password. 8. Enter your e-mail address. You will receive e-mail notification when new information is available in [address] 9. Click Sign Up. You can now view and download portions of your medical record. 10. Click the Download Summary menu link to download a portable copy of your medical information. If you have questions, please visit the Frequently Asked Questions section of the Deskarma website. Remember, Deskarma is NOT to be used for urgent needs. For medical emergencies, dial 911. Now available from your iPhone and Android! Please provide this summary of care documentation to your next provider. Your primary care clinician is listed as Holden Mathews. If you have any questions after today's visit, please call [phone].

## 2017-03-07 NOTE — PROGRESS NOTES
Subjective: Heide Carias is a 67 y.o. female who presents today with the following:  Chief Complaint   Patient presents with   Dwayne Jimenez presents with \"I am on my 5th bottle of Robitussin DM. I can't take this cough! \"  Congested for over a week. Discussed labs from last visit focusing on the lipid panel. ROS:  Gen: denies fever, chills, fatigue, weight loss, weight gain  HEENT:denies blurry vision, positive nasal congestion, negative  sore throat  Resp: denies dypsnea,positive  cough, negative wheezing  CV: denies chest pain radiating to the jaws or arms, palpitations, lower extremity edema  Abd: denies nausea, vomiting, diarrhea, constipation  Neuro: denies numbness/tingling  Endo: denies polyuria, polydipsia, heat/cold intolerance  Heme: no lymphadenopathy    Allergies   Allergen Reactions    Pcn [Penicillins] Unknown (comments)         Current Outpatient Prescriptions:     loratadine (CLARITIN) 10 mg tablet, Take 1 Tab by mouth daily. Indications: ALLERGIC RHINITIS, Disp: 30 Tab, Rfl: 4    fluticasone (FLONASE) 50 mcg/actuation nasal spray, One spray per nostril twice a day  Indications: ALLERGIC RHINITIS, Disp: 1 Bottle, Rfl: 5    benzonatate (TESSALON) 200 mg capsule, Take 1 Cap by mouth three (3) times daily as needed for Cough for up to 7 days. , Disp: 30 Cap, Rfl: 3    valACYclovir (VALTREX) 500 mg tablet, Take 1 Tab by mouth two (2) times daily as needed. , Disp: 30 Tab, Rfl: 0    naproxen sodium (ALEVE) 220 mg cap, Take  by mouth., Disp: , Rfl:     Past Medical History:   Diagnosis Date    Arthritis     GERD (gastroesophageal reflux disease)     Herpes     OA (osteoarthritis)     Optic neuritis        Past Surgical History:   Procedure Laterality Date    HX BREAST AUGMENTATION      HX COLONOSCOPY  2005    [facility]    HX ORTHOPAEDIC Left 10/14/2016    left foot planter fibroma    HX TUBAL LIGATION         History   Smoking Status    Former Smoker Smokeless Tobacco    Never Used       Social History     Social History    Marital status:      Spouse name: N/A    Number of children: N/A    Years of education: N/A     Social History Main Topics    Smoking status: Former Smoker    Smokeless tobacco: Never Used    Alcohol use 37.8 oz/week     21 Glasses of wine, 42 Shots of liquor per week    Drug use: No    Sexual activity: Not Asked     Other Topics Concern    None     Social History Narrative       Family History   Problem Relation Age of Onset    Heart Disease Father     Hypertension Mother     Liver Disease Brother      Hepatitis C    Heart Failure Brother          Objective:     Visit Vitals    /70 (BP 1 Location: Left arm, BP Patient Position: Sitting)    Pulse (!) 105    Temp 97.6 °F (36.4 °C) (Temporal)    Resp 18    Ht 5' 2\" (1.575 m)    Wt 123 lb (55.8 kg)    SpO2 96%    BMI 22.5 kg/m2     Body mass index is 22.5 kg/(m^2). General: Alert and oriented. No acute distress. Well nourished  HEENT :  Ears:TMs are normal. Canals are clear. Eyes: pupils equal, round, react to light and accommodation. Nose: patent pale with clear drainage. Mouth and throat is clear with clear drainage  Neck:supple full range of motion no thyromegaly. Trachea midline, No carotid bruits. No significant lymphadenopathy  Lungs[de-identified] clear to auscultation without wheezes, rales, or rhonchi. Heart :RRR, S1 & S2 are normal intensity. No murmur; no gallop          No results found for this visit on 03/07/17. Assessment/ Plan:     Shayna Beard was seen today for cold symptoms. Diagnoses and all orders for this visit:    Viral upper respiratory tract infection    PND (post-nasal drip)    Environmental and seasonal allergies    Other orders  -     loratadine (CLARITIN) 10 mg tablet; Take 1 Tab by mouth daily. Indications: ALLERGIC RHINITIS  -     fluticasone (FLONASE) 50 mcg/actuation nasal spray;  One spray per nostril twice a day  Indications: ALLERGIC RHINITIS  -     benzonatate (TESSALON) 200 mg capsule; Take 1 Cap by mouth three (3) times daily as needed for Cough for up to 7 days. 1. Viral upper respiratory tract infection    2. PND (post-nasal drip)    3. Environmental and seasonal allergies        Orders Placed This Encounter    loratadine (CLARITIN) 10 mg tablet     Sig: Take 1 Tab by mouth daily. Indications: ALLERGIC RHINITIS     Dispense:  30 Tab     Refill:  4    fluticasone (FLONASE) 50 mcg/actuation nasal spray     Sig: One spray per nostril twice a day  Indications: ALLERGIC RHINITIS     Dispense:  1 Bottle     Refill:  5    benzonatate (TESSALON) 200 mg capsule     Sig: Take 1 Cap by mouth three (3) times daily as needed for Cough for up to 7 days. Dispense:  30 Cap     Refill:  3     RTO prn     Verbal and written instructions (see AVS) provided.  Patient expresses understanding of diagnosis and treatment plan.     Lucinda Bernheim, FNP-C

## 2017-03-09 ENCOUNTER — TELEPHONE (OUTPATIENT)
Dept: FAMILY MEDICINE CLINIC | Age: 72
End: 2017-03-09

## 2017-03-09 NOTE — TELEPHONE ENCOUNTER
Called and SW patient and she stated that she needs something for her cough. She didn't get anything when she was here, she has now gotten worst and feels it in her Chest.   She is coughing up lots of cold and is not sleeping at all at nights.

## 2017-03-10 RX ORDER — AZITHROMYCIN 250 MG/1
TABLET, FILM COATED ORAL
Qty: 6 TAB | Refills: 0 | Status: SHIPPED | OUTPATIENT
Start: 2017-03-10 | End: 2017-04-18 | Stop reason: ALTCHOICE

## 2017-03-10 RX ORDER — CODEINE PHOSPHATE AND GUAIFENESIN 10; 100 MG/5ML; MG/5ML
10 SOLUTION ORAL
Qty: 120 ML | Refills: 0
Start: 2017-03-10 | End: 2017-04-18 | Stop reason: ALTCHOICE

## 2017-03-10 NOTE — TELEPHONE ENCOUNTER
Pt states she is worse than she was the day she came in. Coughing up green all the time now. Pills given do not work at all. Please advise.

## 2017-04-18 ENCOUNTER — TELEPHONE (OUTPATIENT)
Dept: FAMILY MEDICINE CLINIC | Age: 72
End: 2017-04-18

## 2017-04-18 ENCOUNTER — OFFICE VISIT (OUTPATIENT)
Dept: FAMILY MEDICINE CLINIC | Age: 72
End: 2017-04-18

## 2017-04-18 VITALS
HEIGHT: 62 IN | BODY MASS INDEX: 22.89 KG/M2 | OXYGEN SATURATION: 97 % | WEIGHT: 124.4 LBS | HEART RATE: 88 BPM | TEMPERATURE: 97.2 F | SYSTOLIC BLOOD PRESSURE: 142 MMHG | DIASTOLIC BLOOD PRESSURE: 82 MMHG | RESPIRATION RATE: 20 BRPM

## 2017-04-18 DIAGNOSIS — R19.4 CHANGE IN BOWEL HABIT: ICD-10-CM

## 2017-04-18 DIAGNOSIS — K62.5 RECTAL BLEEDING: Primary | ICD-10-CM

## 2017-04-18 DIAGNOSIS — B00.9 HERPES: ICD-10-CM

## 2017-04-18 DIAGNOSIS — K60.2 ANAL FISSURE: ICD-10-CM

## 2017-04-18 RX ORDER — VALACYCLOVIR HYDROCHLORIDE 500 MG/1
500 TABLET, FILM COATED ORAL
Qty: 30 TAB | Refills: 3 | Status: SHIPPED | OUTPATIENT
Start: 2017-04-18 | End: 2017-04-21

## 2017-04-18 NOTE — PROGRESS NOTES
Kaylene Navarro is a 67 y.o. female presenting for/with:    Rectal Bleeding and Diarrhea      HPI:  Symptoms include BRBPR x2 over past 2 weeks, happened twice, last time 4d ago, water was like red cool-aid, still transparent. First time was a few drops in the commode. Pt also reports her stools have become loose, light colored over past month. Associated with fecal urgency and cramps. Evaluation to date: none. Treatment to date: pepto bismol, helps the cramps some. Lizbet Sofia next mo for eval, possible rpt colo (had normal scope about 10y ago with him). PMH, SH, Medications/Allergies: reviewed, on chart. ROS:  Constitutional: No fever, chills or weight loss  Respiratory: No cough, SOB   CV: No chest pain or Palpitations    Visit Vitals    /82 (BP 1 Location: Left arm, BP Patient Position: Sitting)    Pulse 88    Temp 97.2 °F (36.2 °C) (Temporal)    Resp 20    Ht 5' 2\" (1.575 m)    Wt 124 lb 6.4 oz (56.4 kg)    SpO2 97%    BMI 22.75 kg/m2     Wt Readings from Last 3 Encounters:   04/18/17 124 lb 6.4 oz (56.4 kg)   03/07/17 123 lb (55.8 kg)   11/10/16 122 lb (55.3 kg)     BP Readings from Last 3 Encounters:   04/18/17 142/82   03/07/17 110/70   11/10/16 120/80       Physical Examination: General appearance - alert, well appearing, and in no distress  Mental status - alert, oriented to person, place, and time  Eyes - pupils equal and reactive, extraocular eye movements intact  Rectal- moderate ext hemorrhoid present, with medium to large anal fissue at 6:00  Extremities - peripheral pulses normal, no pedal edema, no clubbing or cyanosis. A/p:  Rectal bleeding, low volume, with anal fissure and hemorrhoid, and change in bowel habits  See Dr. Shira Sofia soon (has appt 5/4/17). Check CBC, c-diff tox. Did get a course of abx in March, loose stools started after that course. Anal fissure care ed. Elev BP  Better on repeat. Monitor. F/U per GI or labs, or in late May for recheck.

## 2017-04-18 NOTE — MR AVS SNAPSHOT
Visit Information Date & Time Provider Department Dept. Phone Encounter #  
 4/18/2017 10:50 AM Alcira Watson MD [address] [place] [address] [phone] [id number] Follow-up Instructions Return in about 6 weeks (around 5/30/2017). Follow-up and Disposition History Upcoming Health Maintenance Date Due FOBT Q 1 YEAR AGE 50-75 2/17/1994 Pneumococcal 65+ Low/Medium Risk (2 of 2 - PPSV23) 11/10/2017 MEDICARE YEARLY EXAM 3/8/2018 GLAUCOMA SCREENING Q2Y 3/7/2019 BREAST CANCER SCRN MAMMOGRAM 3/7/2019 DTaP/Tdap/Td series (2 - Td) 3/7/2027 Allergies as of 4/18/2017  Review Complete On: 4/18/2017 By: Alcira Watson MD  
  
 Severity Noted Reaction Type Reactions Pcn [Penicillins]  02/09/2015    Unknown (comments) Current Immunizations  Reviewed on 11/10/2016 Name Date Influenza High Dose Vaccine PF 11/10/2016 Pneumococcal Conjugate (PCV-13) 11/10/2016 Not reviewed this visit You Were Diagnosed With   
  
 Codes Comments Rectal bleeding    -  Primary ICD-10-CM: K62.5 ICD-9-CM: 569.3 Herpes     ICD-10-CM: B00.9 ICD-9-CM: 054.9 Change in bowel habit     ICD-10-CM: R19.4 ICD-9-CM: 787.99 Anal fissure     ICD-10-CM: K60.2 ICD-9-CM: 565.0 Vitals BP Pulse Temp Resp Height(growth percentile) 142/82 (BP 1 Location: Left arm, BP Patient Position: Sitting) 88 97.2 °F (36.2 °C) (Temporal) 20 5' 2\" (1.575 m) Weight(growth percentile) SpO2 BMI OB Status Smoking Status 124 lb 6.4 oz (56.4 kg) 97% 22.75 kg/m2 Menopause Former Smoker Vitals History BMI and BSA Data Body Mass Index Body Surface Area 22.75 kg/m 2 1.57 m 2 Preferred Pharmacy Pharmacy Name Phone [id number] [address] [phone] Your Updated Medication List  
  
   
This list is accurate as of: 4/18/17 11:43 AM.  Always use your most recent med list.  
 ALEVE 220 mg Cap Generic drug:  naproxen sodium Take  by mouth. fluticasone 50 mcg/actuation nasal spray Commonly known as:  Yin Rad One spray per nostril twice a day  Indications: ALLERGIC RHINITIS  
  
 loratadine 10 mg tablet Commonly known as:  Jacky Ed Take 1 Tab by mouth daily. Indications: ALLERGIC RHINITIS  
  
 valACYclovir 500 mg tablet Commonly known as:  VALTREX Take 1 Tab by mouth two (2) times daily as needed for up to 3 days. Indications: HSV recurrence Prescriptions Sent to Pharmacy Refills  
 valACYclovir (VALTREX) 500 mg tablet 3 Sig: Take 1 Tab by mouth two (2) times daily as needed for up to 3 days. Indications: HSV recurrence Class: Normal  
 Pharmacy: [id number] [address] Ph #: [phone] Route: Oral  
  
We Performed the Following C DIFFICILE TOXIN A & B BY EIA [66830 CPT(R)] CBC WITH AUTOMATED DIFF [21872 CPT(R)] Follow-up Instructions Return in about 6 weeks (around 5/30/2017). Patient Instructions Anal Fissure: Care Instructions Your Care Instructions An anal fissure is a tear in the lining of the lower rectum (anus). It can itch and cause pain. You may notice bright red blood on toilet paper after you wipe. A fissure may form if you are constipated and try to pass a large, hard stool or if you do not relax your anal muscles during a bowel movement. Most anal fissures heal with home treatment after a few days or weeks. If you have an anal fissure that takes more time to heal, your doctor may prescribe medicine. In rare cases, surgery may be needed. Anal fissures do not lead to colon cancer or other serious illnesses. However, if you have blood mixed in with the stool, talk to your doctor. Follow-up care is a key part of your treatment and safety.  Be sure to make and go to all appointments, and call your doctor if you are having problems. It's also a good idea to know your test results and keep a list of the medicines you take. How can you care for yourself at home? · If your doctor prescribed cream or ointment, use it exactly as prescribed. Call your doctor if you think you are having a problem with your medicine. You will get more details on the specific medicines your doctor prescribes. · Sit in a few inches of warm water (sitz bath) 3 times a day and after bowel movements. The warm water helps the area heal and eases discomfort. Do not put soaps, salts, or shampoos in the water. · Avoid constipation: 
¨ Include fruits, vegetables, beans, and whole grains in your diet each day. These foods are high in fiber. ¨ Drink plenty of fluids, enough so that your urine is light yellow or clear like water. If you have kidney, heart, or liver disease and have to limit fluids, talk with your doctor before you increase the amount of fluids you drink. ¨ Get some exercise every day. Build up slowly to 30 to 60 minutes a day on 5 or more days of the week. ¨ Take a fiber supplement, such as Benefiber, Citrucel, or Metamucil, every day if needed. Read and follow all instructions on the label. ¨ Use the toilet when you feel the urge. Or when you can, schedule time each day for a bowel movement. A daily routine may help. Take your time and do not strain when having a bowel movement. But do not sit on the toilet too long. · Support your feet with a small step stool when you sit on the toilet. This helps flex your hips and places your pelvis in a squatting position. · Your doctor may recommend an over-the-counter laxative, such as Miralax, Milk of Magnesia, or Ex-Lax. Read and follow all instructions on the label, and do not use these medicines on a long-term basis. · Do not use over-the-counter ointments or creams without talking to your doctor. Some of these preparations may not help. · Use baby wipes or medicated pads, such as Preparation H or Tucks, instead of toilet paper to clean after a bowel movement. These products do not irritate the anus. · Be safe with medicines. Read and follow all instructions on the label. If the doctor gave you a prescription medicine for pain, take it as prescribed. If you are not taking a prescription pain medicine, ask your doctor if you can take an over-the-counter medicine. When should you call for help? Watch closely for changes in your health, and be sure to contact your doctor if: 
· You do not get better as expected. · You have difficulty passing stools. · You have any new symptoms, such as blood in your stools. Where can you learn more? Go to http://darian-parag.info/. Enter U988 in the search box to learn more about \"Anal Fissure: Care Instructions. \" Current as of: August 9, 2016 Content Version: 11.2 © 4656-2424 Vidiowiki. Care instructions adapted under license by BO.LT (which disclaims liability or warranty for this information). If you have questions about a medical condition or this instruction, always ask your healthcare professional. [place] [address] any warranty or liability for your use of this information. Introducing [facility]! [employer] introduces prettysecrets patient portal. Now you can access parts of your medical record, email your doctor's office, and request medication refills online. 1. In your internet browser, go to [email or web address] 2. Click on the First Time User? Click Here link in the Sign In box. You will see the New Member Sign Up page. 3. Enter your prettysecrets Access Code exactly as it appears below. You will not need to use this code after youve completed the sign-up process. If you do not sign up before the expiration date, you must request a new code. · prettysecrets Access Code: [email or web address] Expires: 5/22/2017 10:42 AM 
 
4. Enter the last four digits of your Social Security Number (xxxx) and Date of Birth (mm/dd/yyyy) as indicated and click Submit. You will be taken to the next sign-up page. 5. Create a Microlight Sensors ID. This will be your Microlight Sensors login ID and cannot be changed, so think of one that is secure and easy to remember. 6. Create a Microlight Sensors password. You can change your password at any time. 7. Enter your Password Reset Question and Answer. This can be used at a later time if you forget your password. 8. Enter your e-mail address. You will receive e-mail notification when new information is available in [address] 9. Click Sign Up. You can now view and download portions of your medical record. 10. Click the Download Summary menu link to download a portable copy of your medical information. If you have questions, please visit the Frequently Asked Questions section of the Microlight Sensors website. Remember, Microlight Sensors is NOT to be used for urgent needs. For medical emergencies, dial 911. Now available from your iPhone and Android! Please provide this summary of care documentation to your next provider. Your primary care clinician is listed as Sherita Murillo. If you have any questions after today's visit, please call [phone].

## 2017-04-18 NOTE — PATIENT INSTRUCTIONS
Anal Fissure: Care Instructions  Your Care Instructions  An anal fissure is a tear in the lining of the lower rectum (anus). It can itch and cause pain. You may notice bright red blood on toilet paper after you wipe. A fissure may form if you are constipated and try to pass a large, hard stool or if you do not relax your anal muscles during a bowel movement. Most anal fissures heal with home treatment after a few days or weeks. If you have an anal fissure that takes more time to heal, your doctor may prescribe medicine. In rare cases, surgery may be needed. Anal fissures do not lead to colon cancer or other serious illnesses. However, if you have blood mixed in with the stool, talk to your doctor. Follow-up care is a key part of your treatment and safety. Be sure to make and go to all appointments, and call your doctor if you are having problems. It's also a good idea to know your test results and keep a list of the medicines you take. How can you care for yourself at home? · If your doctor prescribed cream or ointment, use it exactly as prescribed. Call your doctor if you think you are having a problem with your medicine. You will get more details on the specific medicines your doctor prescribes. · Sit in a few inches of warm water (sitz bath) 3 times a day and after bowel movements. The warm water helps the area heal and eases discomfort. Do not put soaps, salts, or shampoos in the water. · Avoid constipation:  ¨ Include fruits, vegetables, beans, and whole grains in your diet each day. These foods are high in fiber. ¨ Drink plenty of fluids, enough so that your urine is light yellow or clear like water. If you have kidney, heart, or liver disease and have to limit fluids, talk with your doctor before you increase the amount of fluids you drink. ¨ Get some exercise every day. Build up slowly to 30 to 60 minutes a day on 5 or more days of the week.   ¨ Take a fiber supplement, such as Benefiber, Citrucel, or Metamucil, every day if needed. Read and follow all instructions on the label. ¨ Use the toilet when you feel the urge. Or when you can, schedule time each day for a bowel movement. A daily routine may help. Take your time and do not strain when having a bowel movement. But do not sit on the toilet too long. · Support your feet with a small step stool when you sit on the toilet. This helps flex your hips and places your pelvis in a squatting position. · Your doctor may recommend an over-the-counter laxative, such as Miralax, Milk of Magnesia, or Ex-Lax. Read and follow all instructions on the label, and do not use these medicines on a long-term basis. · Do not use over-the-counter ointments or creams without talking to your doctor. Some of these preparations may not help. · Use baby wipes or medicated pads, such as Preparation H or Tucks, instead of toilet paper to clean after a bowel movement. These products do not irritate the anus. · Be safe with medicines. Read and follow all instructions on the label. If the doctor gave you a prescription medicine for pain, take it as prescribed. If you are not taking a prescription pain medicine, ask your doctor if you can take an over-the-counter medicine. When should you call for help? Watch closely for changes in your health, and be sure to contact your doctor if:  · You do not get better as expected. · You have difficulty passing stools. · You have any new symptoms, such as blood in your stools. Where can you learn more? Go to http://darian-parag.info/. Enter A108 in the search box to learn more about \"Anal Fissure: Care Instructions. \"  Current as of: August 9, 2016  Content Version: 11.2  © 3153-3481 [employer]. Care instructions adapted under license by [employer] (which disclaims liability or warranty for this information).  If you have questions about a medical condition or this instruction, always ask your healthcare professional. [address] any warranty or liability for your use of this information.

## 2017-04-19 LAB
BASOPHILS # BLD AUTO: 0 X10E3/UL (ref 0–0.2)
BASOPHILS NFR BLD AUTO: 1 %
EOSINOPHIL # BLD AUTO: 0.2 X10E3/UL (ref 0–0.4)
EOSINOPHIL NFR BLD AUTO: 3 %
ERYTHROCYTE [DISTWIDTH] IN BLOOD BY AUTOMATED COUNT: 12.8 % (ref 12.3–15.4)
HCT VFR BLD AUTO: 41.7 % (ref 34–46.6)
HGB BLD-MCNC: 14.3 G/DL (ref 11.1–15.9)
IMM GRANULOCYTES # BLD: 0 X10E3/UL (ref 0–0.1)
IMM GRANULOCYTES NFR BLD: 0 %
LYMPHOCYTES # BLD AUTO: 1.9 X10E3/UL (ref 0.7–3.1)
LYMPHOCYTES NFR BLD AUTO: 33 %
MCH RBC QN AUTO: 34.4 PG (ref 26.6–33)
MCHC RBC AUTO-ENTMCNC: 34.3 G/DL (ref 31.5–35.7)
MCV RBC AUTO: 100 FL (ref 79–97)
MONOCYTES # BLD AUTO: 0.5 X10E3/UL (ref 0.1–0.9)
MONOCYTES NFR BLD AUTO: 8 %
NEUTROPHILS # BLD AUTO: 3.1 X10E3/UL (ref 1.4–7)
NEUTROPHILS NFR BLD AUTO: 55 %
PLATELET # BLD AUTO: 213 X10E3/UL (ref 150–379)
RBC # BLD AUTO: 4.16 X10E6/UL (ref 3.77–5.28)
WBC # BLD AUTO: 5.8 X10E3/UL (ref 3.4–10.8)

## 2017-04-24 LAB
C DIFF TOX A+B STL QL IA: NORMAL
CAMPYLOBACTER STL CULT: NORMAL
E COLI SXT STL QL IA: NEGATIVE
SALM + SHIG STL CULT: NORMAL

## 2017-11-06 ENCOUNTER — OFFICE VISIT (OUTPATIENT)
Dept: FAMILY MEDICINE CLINIC | Age: 72
End: 2017-11-06

## 2017-11-06 VITALS
DIASTOLIC BLOOD PRESSURE: 70 MMHG | OXYGEN SATURATION: 97 % | BODY MASS INDEX: 21.9 KG/M2 | HEIGHT: 61 IN | RESPIRATION RATE: 16 BRPM | SYSTOLIC BLOOD PRESSURE: 130 MMHG | HEART RATE: 63 BPM | TEMPERATURE: 97.2 F | WEIGHT: 116 LBS

## 2017-11-06 DIAGNOSIS — Z00.00 WELL ADULT HEALTH CHECK: Primary | ICD-10-CM

## 2017-11-06 DIAGNOSIS — A09 TRAVELER'S DIARRHEA: ICD-10-CM

## 2017-11-06 DIAGNOSIS — Z23 ENCOUNTER FOR IMMUNIZATION: ICD-10-CM

## 2017-11-06 DIAGNOSIS — E87.5 HYPERKALEMIA: ICD-10-CM

## 2017-11-06 RX ORDER — CIPROFLOXACIN 500 MG/1
500 TABLET ORAL 2 TIMES DAILY
Qty: 14 TAB | Refills: 0 | Status: SHIPPED | OUTPATIENT
Start: 2017-11-06 | End: 2017-11-13

## 2017-11-06 NOTE — MR AVS SNAPSHOT
Visit Information Date & Time Provider Department Dept. Phone Encounter #  
 11/6/2017 11:00 AM Cecilia Uriostegui MD [address] [place] [address] [phone] [id number] Upcoming Health Maintenance Date Due Pneumococcal 65+ Low/Medium Risk (2 of 2 - PPSV23) 11/10/2017 MEDICARE YEARLY EXAM 3/8/2018 GLAUCOMA SCREENING Q2Y 3/7/2019 BREAST CANCER SCRN MAMMOGRAM 3/7/2019 DTaP/Tdap/Td series (2 - Td) 3/7/2027 COLONOSCOPY 5/22/2027 Allergies as of 11/6/2017  Review Complete On: 11/6/2017 By: Kenya Valdez RN Severity Noted Reaction Type Reactions Pcn [Penicillins]  02/09/2015    Unknown (comments) Current Immunizations  Reviewed on 11/10/2016 Name Date Influenza High Dose Vaccine PF 11/6/2017, 11/10/2016 Pneumococcal Conjugate (PCV-13) 11/10/2016 Not reviewed this visit You Were Diagnosed With   
  
 Codes Comments Well adult health check    -  Primary ICD-10-CM: Z00.00 ICD-9-CM: V70.0 Encounter for immunization     ICD-10-CM: K11 ICD-9-CM: V03.89 Traveler's diarrhea     ICD-10-CM: A09 ICD-9-CM: 585. 2 Vitals BP Pulse Temp Resp Height(growth percentile) 130/70 (BP 1 Location: Left arm, BP Patient Position: Sitting) 63 97.2 °F (36.2 °C) (Temporal) 16 5' 1\" (1.549 m) Weight(growth percentile) SpO2 BMI OB Status Smoking Status 116 lb (52.6 kg) 97% 21.92 kg/m2 Menopause Former Smoker Vitals History BMI and BSA Data Body Mass Index Body Surface Area  
 21.92 kg/m 2 1.5 m 2 Preferred Pharmacy Pharmacy Name Phone [id number], [address] [phone] Your Updated Medication List  
  
   
This list is accurate as of: 11/6/17 11:47 AM.  Always use your most recent med list.  
  
  
  
  
 ALEVE 220 mg Cap Generic drug:  naproxen sodium Take  by mouth. ciprofloxacin HCl 500 mg tablet Commonly known as:  CIPRO Take 1 Tab by mouth two (2) times a day for 7 days. Indications: TRAVELER'S DIARRHEA  
  
 fluticasone 50 mcg/actuation nasal spray Commonly known as:  Roberto Naranjo One spray per nostril twice a day  Indications: ALLERGIC RHINITIS  
  
 loratadine 10 mg tablet Commonly known as:  Brando Joyner Take 1 Tab by mouth daily. Indications: ALLERGIC RHINITIS Prescriptions Sent to Pharmacy Refills  
 ciprofloxacin HCl (CIPRO) 500 mg tablet 0 Sig: Take 1 Tab by mouth two (2) times a day for 7 days. Indications: TRAVELER'S DIARRHEA Class: Normal  
 Pharmacy: [id number] [place], [address] [facility] [phone] Route: Oral  
  
We Performed the Following CBC WITH AUTOMATED DIFF [41209 CPT(R)] INFLUENZA VIRUS VACCINE, HIGH DOSE SEASONAL, PRESERVATIVE FREE [67424 CPT(R)] LIPID PANEL WITH LDL/HDL RATIO [73684 CPT(R)] METABOLIC PANEL, COMPREHENSIVE [74640 CPT(R)] [place] IMMUNIZ ADMIN,1 SINGLE/COMB VAC/TOXOID [id number] CPT(R)] Introducing [facility]! Isaccsidney Weldon introduces [employer] patient portal. Now you can access parts of your medical record, email your doctor's office, and request medication refills online. 1. In your internet browser, go to [email or web address] 2. Click on the First Time User? Click Here link in the Sign In box. You will see the New Member Sign Up page. 3. Enter your [employer] Access Code exactly as it appears below. You will not need to use this code after youve completed the sign-up process. If you do not sign up before the expiration date, you must request a new code. · [employer] Access Code: [email or web address] Expires: 2/4/2018 11:47 AM 
 
4. Enter the last four digits of your Social Security Number (xxxx) and Date of Birth (mm/dd/yyyy) as indicated and click Submit. You will be taken to the next sign-up page. 5. Create a [employer] ID.  This will be your [employer] login ID and cannot be changed, so think of one that is secure and easy to remember. 6. Create a [employer] password. You can change your password at any time. 7. Enter your Password Reset Question and Answer. This can be used at a later time if you forget your password. 8. Enter your e-mail address. You will receive e-mail notification when new information is available in [address] 9. Click Sign Up. You can now view and download portions of your medical record. 10. Click the Download Summary menu link to download a portable copy of your medical information. If you have questions, please visit the Frequently Asked Questions section of the [employer] website. Remember, [employer] is NOT to be used for urgent needs. For medical emergencies, dial 911. Now available from your iPhone and Android! Please provide this summary of care documentation to your next provider. Your primary care clinician is listed as Laura Montero. If you have any questions after today's visit, please call [phone].

## 2017-11-06 NOTE — PROGRESS NOTES
HISTORY OF PRESENT ILLNESS  Yuliet Castro is a 67 y.o. female. Chief Complaint   Patient presents with    Physical       HPI  Here for wellness check  Does not need Pap, sees Gyn  Does not want Mammogram or breast exam    ETOH every day 2 wine and 2 whiskey  Not smoking    Going to [facility] [address] for traveler's diarrhea    Review of Systems   Constitutional: Positive for weight loss (eating less). Negative for chills and fever. HENT: Positive for congestion (stopped up). Negative for sore throat. Eyes: Negative for blurred vision. Respiratory: Positive for shortness of breath (with allergies). Negative for cough. Cardiovascular: Negative for chest pain, palpitations and leg swelling. Gastrointestinal: Positive for heartburn (occ only). Negative for abdominal pain, blood in stool, constipation, diarrhea, melena, nausea and vomiting. Genitourinary: Negative for frequency and hematuria. Musculoskeletal: Positive for myalgias (aches from physical activities). Negative for joint pain. Skin: Negative. Neurological: Negative for dizziness and headaches. Endo/Heme/Allergies: Negative for polydipsia. Bruises/bleeds easily. Psychiatric/Behavioral: Negative for depression. The patient is not nervous/anxious.       Past Medical History:   Diagnosis Date    Environmental and seasonal allergies     GERD (gastroesophageal reflux disease)     Herpes     Genital    Hx of colonoscopy 05/22/2017    OA (osteoarthritis)      Past Surgical History:   Procedure Laterality Date    HX BREAST AUGMENTATION      HX CATARACT REMOVAL Bilateral     [facility]X [facility]  [facility]    HX COLONOSCOPY  05/22/2017    nl    HX ORTHOPAEDIC Left 10/14/2016    left foot planter fibroma    HX ORTHOPAEDIC      right hand fibroma    HX TUBAL LIGATION         Current Outpatient Prescriptions   Medication Sig Dispense Refill    ciprofloxacin HCl (CIPRO) 500 mg tablet Take 1 Tab by mouth two (2) times a day for 7 days. Indications: TRAVELER'S DIARRHEA 14 Tab 0    loratadine (CLARITIN) 10 mg tablet Take 1 Tab by mouth daily. Indications: ALLERGIC RHINITIS 30 Tab 4    fluticasone (FLONASE) 50 mcg/actuation nasal spray One spray per nostril twice a day  Indications: ALLERGIC RHINITIS 1 Bottle 5    naproxen sodium (ALEVE) 220 mg cap Take  by mouth. Allergies   Allergen Reactions    Pcn [Penicillins] Unknown (comments)     Visit Vitals    /70 (BP 1 Location: Left arm, BP Patient Position: Sitting)    Pulse 63    Temp 97.2 °F (36.2 °C) (Temporal)    Resp 16    Ht 5' 1\" (1.549 m)    Wt 116 lb (52.6 kg)    SpO2 97%    BMI 21.92 kg/m2     Physical Exam   Constitutional: She is oriented to person, place, and time. She appears well-developed and well-nourished. No distress (  ). HENT:   Head: Normocephalic and atraumatic. Right Ear: External ear normal.   Left Ear: External ear normal.   Nose: Nose normal.   Mouth/Throat: Oropharynx is clear and moist. No oropharyngeal exudate. Eyes: Conjunctivae and EOM are normal. Pupils are equal, round, and reactive to light. Neck: No thyromegaly present. Cardiovascular: Normal rate, regular rhythm, normal heart sounds and intact distal pulses. Pulmonary/Chest: Effort normal and breath sounds normal.   Pt declining breast exam   Abdominal: Soft. Bowel sounds are normal. She exhibits no distension. There is no tenderness. Musculoskeletal: She exhibits no edema. Lymphadenopathy:     She has no cervical adenopathy. Neurological: She is alert and oriented to person, place, and time. Skin: Skin is warm and dry. Psychiatric: She has a normal mood and affect. Nursing note and vitals reviewed. ASSESSMENT and PLAN    ICD-10-CM ICD-9-CM    1. Well adult health check Z00.00 V70.0 LIPID PANEL WITH LDL/HDL RATIO      METABOLIC PANEL, COMPREHENSIVE      CBC WITH AUTOMATED DIFF   2.  Encounter for immunization Z23 V03.89 INFLUENZA VIRUS VACCINE, HIGH DOSE SEASONAL, PRESERVATIVE FREE      [place] IMMUNIZ ADMIN,1 SINGLE/COMB VAC/TOXOID   3.  Traveler's diarrhea A09 009.2 ciprofloxacin HCl (CIPRO) 500 mg tablet

## 2017-11-07 LAB
ALBUMIN SERPL-MCNC: 4.8 G/DL (ref 3.5–4.8)
ALBUMIN/GLOB SERPL: 1.8 {RATIO} (ref 1.2–2.2)
ALP SERPL-CCNC: 91 IU/L (ref 39–117)
ALT SERPL-CCNC: 32 IU/L (ref 0–32)
AST SERPL-CCNC: 36 IU/L (ref 0–40)
BASOPHILS # BLD AUTO: 0.1 X10E3/UL (ref 0–0.2)
BASOPHILS NFR BLD AUTO: 1 %
BILIRUB SERPL-MCNC: 0.5 MG/DL (ref 0–1.2)
BUN SERPL-MCNC: 13 MG/DL (ref 8–27)
BUN/CREAT SERPL: 19 (ref 12–28)
CALCIUM SERPL-MCNC: 9.9 MG/DL (ref 8.7–10.3)
CHLORIDE SERPL-SCNC: 101 MMOL/L (ref 96–106)
CHOLEST SERPL-MCNC: 310 MG/DL (ref 100–199)
CO2 SERPL-SCNC: 24 MMOL/L (ref 18–29)
CREAT SERPL-MCNC: 0.67 MG/DL (ref 0.57–1)
EOSINOPHIL # BLD AUTO: 0.2 X10E3/UL (ref 0–0.4)
EOSINOPHIL NFR BLD AUTO: 3 %
ERYTHROCYTE [DISTWIDTH] IN BLOOD BY AUTOMATED COUNT: 13.4 % (ref 12.3–15.4)
GFR SERPLBLD CREATININE-BSD FMLA CKD-EPI: 101 ML/MIN/1.73
GFR SERPLBLD CREATININE-BSD FMLA CKD-EPI: 87 ML/MIN/1.73
GLOBULIN SER CALC-MCNC: 2.6 G/DL (ref 1.5–4.5)
GLUCOSE SERPL-MCNC: 83 MG/DL (ref 65–99)
HCT VFR BLD AUTO: 44.2 % (ref 34–46.6)
HDLC SERPL-MCNC: 152 MG/DL
HGB BLD-MCNC: 15.1 G/DL (ref 11.1–15.9)
IMM GRANULOCYTES # BLD: 0 X10E3/UL (ref 0–0.1)
IMM GRANULOCYTES NFR BLD: 0 %
LDLC SERPL CALC-MCNC: 146 MG/DL (ref 0–99)
LDLC/HDLC SERPL: 1 RATIO UNITS (ref 0–3.2)
LYMPHOCYTES # BLD AUTO: 1.4 X10E3/UL (ref 0.7–3.1)
LYMPHOCYTES NFR BLD AUTO: 22 %
MCH RBC QN AUTO: 33.9 PG (ref 26.6–33)
MCHC RBC AUTO-ENTMCNC: 34.2 G/DL (ref 31.5–35.7)
MCV RBC AUTO: 99 FL (ref 79–97)
MONOCYTES # BLD AUTO: 0.7 X10E3/UL (ref 0.1–0.9)
MONOCYTES NFR BLD AUTO: 11 %
NEUTROPHILS # BLD AUTO: 4.1 X10E3/UL (ref 1.4–7)
NEUTROPHILS NFR BLD AUTO: 63 %
PLATELET # BLD AUTO: 275 X10E3/UL (ref 150–379)
POTASSIUM SERPL-SCNC: 5.6 MMOL/L (ref 3.5–5.2)
PROT SERPL-MCNC: 7.4 G/DL (ref 6–8.5)
RBC # BLD AUTO: 4.46 X10E6/UL (ref 3.77–5.28)
SODIUM SERPL-SCNC: 145 MMOL/L (ref 134–144)
TRIGL SERPL-MCNC: 59 MG/DL (ref 0–149)
VLDLC SERPL CALC-MCNC: 12 MG/DL (ref 5–40)
WBC # BLD AUTO: 6.5 X10E3/UL (ref 3.4–10.8)

## 2017-11-07 NOTE — PROGRESS NOTES
Call pt, the Chol is very high, but it is the good Chol that is high  The sugar, kidney and liver tests are normal  The Potassium and Sodium is high  Increase fluids and recheck Potassium asap  The CBC is ok

## 2018-05-11 ENCOUNTER — TELEPHONE (OUTPATIENT)
Dept: FAMILY MEDICINE CLINIC | Age: 73
End: 2018-05-11

## 2018-05-11 NOTE — TELEPHONE ENCOUNTER
Please fax most recent labs to Dr. Deo Astudillo for possible surgery. Luan Ureña understands they are from November 2017 but would like for comparison.   Fax: [phone]

## 2020-05-29 PROBLEM — J44.1 COPD EXACERBATION (HCC): Status: ACTIVE | Noted: 2020-05-29

## 2020-05-29 PROBLEM — R09.02 HYPOXEMIA: Status: ACTIVE | Noted: 2020-05-29

## 2020-05-29 PROBLEM — J18.9 PNEUMONIA: Status: ACTIVE | Noted: 2020-05-29

## 2020-05-29 PROBLEM — J96.01 ACUTE RESPIRATORY FAILURE WITH HYPOXIA (HCC): Status: ACTIVE | Noted: 2020-05-29

## 2020-05-29 PROBLEM — F10.20 UNCOMPLICATED ALCOHOL DEPENDENCE (HCC): Status: ACTIVE | Noted: 2020-05-29

## 2020-06-01 ENCOUNTER — PATIENT OUTREACH (OUTPATIENT)
Dept: INTERNAL MEDICINE CLINIC | Age: 75
End: 2020-06-01

## 2020-06-01 NOTE — PROGRESS NOTES
Patient contacted regarding COVID-19  risk. Care Transition Nurse contacted the patient by telephone to perform post discharge assessment. Verified name and  with patient as identifiers. Provided introduction to self, and explanation of the CTN role, and reason for call due to risk factors for infection and/or exposure to COVID-19. Symptoms reviewed with patient who verbalized the following symptoms: cough and shortness of breath. Due to no new or worsening symptoms encounter was not routed to provider for escalation. Patient reports she has developed cough and hoarseness, seen by PCP, Dr. Yrn Monzon 6/1/10, recommended her to f/u with her Pulmonologist., will reports she will call to schedule an apppt. Patient has following risk factors of: COPD. CTN reviewed discharge instructions, medical action plan and red flags such as increased shortness of breath, increasing fever and signs of decompensation with patient who verbalized understanding. Discussed exposure protocols, the patient agrees to contact the PCP office for questions related to their healthcare. CTN provided contact information for future needs. Reviewed and educated patient on any new and changed medications related to discharge diagnosis. START taking these medications     Details   azithromycin (Zithromax) 250 mg tablet Take 1 Tab by mouth daily for 3 days. Qty: 3 Tab, Refills: 0     Associated Diagnoses: Pneumonia of both lungs due to infectious organism, unspecified part of lung       cefdinir (OMNICEF) 300 mg capsule Take 1 Cap by mouth two (2) times a day for 5 days.   Qty: 10 Cap, Refills: 0        STOP taking these medications         albuterol-ipratropium (DUO-NEB) 2.5 mg-0.5 mg/3 ml nebu Comments:   Reason for Stopping:            tiotropium-olodateroL (STIOLTO RESPIMAT) 2.5-2.5 mcg/actuation inhaler Comments:   Reason for Stopping:     Patient/family/caregiver given information for [employer] and agrees to enroll yes  Patient's preferred e-mail:  [email or web address]  Patient's preferred phone number: [phone]  Based on Loop alert triggers, patient will be contacted by nurse care manager for worsening symptoms. Pt will be further monitored by COVID Loop Team based on severity of symptoms and risk factors.

## 2020-06-05 ENCOUNTER — OFFICE VISIT (OUTPATIENT)
Dept: PRIMARY CARE CLINIC | Age: 75
End: 2020-06-05

## 2020-06-05 DIAGNOSIS — Z20.828 EXPOSURE TO SARS-ASSOCIATED CORONAVIRUS: Primary | ICD-10-CM

## 2020-06-05 NOTE — PROGRESS NOTES
Patient by office for covid testing per [facility]. Phone [phone] fax is [phone]. Patient has an office appointment on Tuesday of next week.

## 2020-06-09 LAB — SARS-COV-2, NAA: NOT DETECTED

## 2020-07-21 ENCOUNTER — PATIENT OUTREACH (OUTPATIENT)
Dept: CASE MANAGEMENT | Age: 75
End: 2020-07-21

## 2020-08-03 ENCOUNTER — PATIENT OUTREACH (OUTPATIENT)
Dept: CASE MANAGEMENT | Age: 75
End: 2020-08-03

## 2020-08-03 NOTE — PROGRESS NOTES
Patient contacted regarding recent discharge and COVID-19 risk. Discussed COVID-19 related testing which was available at this time. Test results were negative. Patient informed of results, if available? Already advised 20    Care Transition Nurse/ Ambulatory Care Manager contacted the patient by telephone to perform post discharge assessment. Verified name and  with patient as identifiers. Patient has following risk factors of: COPD. CTN/ACM reviewed discharge instructions, medical action plan and red flags related to discharge diagnosis. Reviewed and educated them on any new and changed medications related to discharge diagnosis. Advised obtaining a 90-day supply of all daily and as-needed medications. Advance Care Planning:   Does patient have an Advance Directive: patient declined education     Education provided regarding infection prevention, and signs and symptoms of COVID-19 and when to seek medical attention with patient who verbalized understanding. Discussed exposure protocols and quarantine from [address] you at higher risk for severe illness  and given an opportunity for questions and concerns. The patient agrees to contact the COVID-19 hotline [phone] or PCP office for questions related to their healthcare. CTN/ACM provided contact information for future reference. From CDC: Are you at higher risk for severe illness?  Wash your hands often.  Avoid close contact (6 feet, which is about two arm lengths) with people who are sick.  Put distance between yourself and other people if COVID-19 is spreading in your community.  Clean and disinfect frequently touched surfaces.  Avoid all cruise travel and non-essential air travel.  Call your healthcare professional if you have concerns about COVID-19 and your underlying condition or if you are sick.     For more information on steps you can take to protect yourself, see CDC's How to Protect Yourself Patient/family/caregiver given information for [employer] and agrees to enroll no  Patient's preferred e-mail:  n/a  Patient's preferred phone number: n/a  Based on Loop alert triggers, patient will be contacted by nurse care manager for worsening symptoms. Plan for follow-up call in 7-14 days based on severity of symptoms and risk factors.

## 2020-08-14 ENCOUNTER — HOSPITAL ENCOUNTER (OUTPATIENT)
Dept: PREADMISSION TESTING | Age: 75
Discharge: HOME | End: 2020-08-14
Payer: MEDICARE

## 2020-08-14 DIAGNOSIS — Z01.812 PRE-PROCEDURE LAB EXAM: ICD-10-CM

## 2020-08-14 PROCEDURE — 87635 SARS-COV-2 COVID-19 AMP PRB: CPT

## 2020-08-15 LAB — SARS-COV-2, COV2NT: NOT DETECTED

## 2020-08-17 ENCOUNTER — PATIENT OUTREACH (OUTPATIENT)
Dept: CASE MANAGEMENT | Age: 75
End: 2020-08-17

## 2020-08-17 NOTE — PROGRESS NOTES
Patient resolved from Transition of Care episode on 8/17/20  Discussed COVID-19 related testing which was available at this time. Test results were negative. Patient informed of results, if available? Already advised     Patient/family has been provided the following resources and education related to COVID-19:                         Signs, symptoms and red flags related to COVID-19            CDC exposure and quarantine guidelines            [employer] exposure contact - [phone]            Contact for their local Department of Health                 Patient currently reports that the following symptoms have improved:  no new symptoms. No further outreach scheduled with this CTN/ACM/LPN/HC/ MA. Episode of Care resolved. Patient has this CTN/ACM/LPN/HC/MA contact information if future needs arise.

## 2020-09-14 ENCOUNTER — PATIENT OUTREACH (OUTPATIENT)
Dept: CASE MANAGEMENT | Age: 75
End: 2020-09-14

## 2020-09-14 NOTE — PROGRESS NOTES
Patient contacted regarding recent discharge and COVID-19 risk. Discussed COVID-19 related testing which was not done at this time. Test results were not done. Patient informed of results, if available? no    Care Transition Nurse/ Ambulatory Care Manager/ LPN Care Coordinator contacted the patient by telephone to perform post discharge assessment. Verified name and  with patient as identifiers. Patient has following risk factors of: COPD. CTN/ACM/LPN reviewed discharge instructions, medical action plan and red flags related to discharge diagnosis. Reviewed and educated them on any new and changed medications related to discharge diagnosis. Advised obtaining a 90-day supply of all daily and as-needed medications. Advance Care Planning:   Does patient have an Advance Directive: yes; reviewed and current     Education provided regarding infection prevention, and signs and symptoms of COVID-19 and when to seek medical attention with patient who verbalized understanding. Discussed exposure protocols and quarantine from [address] you at higher risk for severe illness  and given an opportunity for questions and concerns. The patient agrees to contact the COVID-19 hotline [phone] or PCP office for questions related to their healthcare. CTN/ACM/LPN provided contact information for future reference. From CDC: Are you at higher risk for severe illness?  Wash your hands often.  Avoid close contact (6 feet, which is about two arm lengths) with people who are sick.  Put distance between yourself and other people if COVID-19 is spreading in your community.  Clean and disinfect frequently touched surfaces.  Avoid all cruise travel and non-essential air travel.  Call your healthcare professional if you have concerns about COVID-19 and your underlying condition or if you are sick.     For more information on steps you can take to protect yourself, see CDC's How to Protect Yourself Patient/family/caregiver given information for [employer] and agrees to enroll no      Plan for follow-up call in 7-14 days based on severity of symptoms and risk factors.

## 2020-09-28 ENCOUNTER — PATIENT OUTREACH (OUTPATIENT)
Dept: CASE MANAGEMENT | Age: 75
End: 2020-09-28

## 2020-09-28 NOTE — PROGRESS NOTES
Patient resolved from Transition of Care episode on 9/28/20  Discussed COVID-19 related testing which was available at this time. Test results were negative. Patient informed of results, if available? yes     Patient/family has been provided the following resources and education related to COVID-19:                         Signs, symptoms and red flags related to COVID-19            CDC exposure and quarantine guidelines            [employer] exposure contact - [phone]            Contact for their local Department of Health                 Patient currently reports that the following symptoms have improved:  no new symptoms and no worsening symptoms. No further outreach scheduled with this CTN/ACM/LPN/HC/ MA. Episode of Care resolved. Patient has this CTN/ACM/LPN/HC/MA contact information if future needs arise.

## 2020-12-06 PROBLEM — J18.9 CAP (COMMUNITY ACQUIRED PNEUMONIA): Status: ACTIVE | Noted: 2020-12-06

## 2020-12-06 PROBLEM — I10 ESSENTIAL HYPERTENSION: Chronic | Status: ACTIVE | Noted: 2020-12-06

## 2020-12-22 ENCOUNTER — TRANSCRIBE ORDER (OUTPATIENT)
Dept: SCHEDULING | Age: 75
End: 2020-12-22

## 2020-12-22 DIAGNOSIS — R93.89 ABNORMAL RADIOLOGICAL FINDINGS IN SKIN AND SUBCUTANEOUS TISSUE: Primary | ICD-10-CM

## 2021-04-26 ENCOUNTER — APPOINTMENT (OUTPATIENT)
Dept: GENERAL RADIOLOGY | Age: 76
End: 2021-04-26
Attending: EMERGENCY MEDICINE
Payer: MEDICARE

## 2021-04-26 ENCOUNTER — HOSPITAL ENCOUNTER (EMERGENCY)
Age: 76
Discharge: HOME | End: 2021-04-26
Attending: EMERGENCY MEDICINE
Payer: MEDICARE

## 2021-04-26 VITALS
BODY MASS INDEX: 21.24 KG/M2 | RESPIRATION RATE: 22 BRPM | HEART RATE: 86 BPM | TEMPERATURE: 98.3 F | WEIGHT: 118 LBS | SYSTOLIC BLOOD PRESSURE: 172 MMHG | OXYGEN SATURATION: 98 % | DIASTOLIC BLOOD PRESSURE: 86 MMHG

## 2021-04-26 DIAGNOSIS — J20.9 ACUTE BRONCHITIS, UNSPECIFIED ORGANISM: Primary | ICD-10-CM

## 2021-04-26 LAB
ALBUMIN SERPL-MCNC: 3.9 G/DL (ref 3.5–5)
ALBUMIN/GLOB SERPL: 1.1 {RATIO} (ref 1.1–2.2)
ALP SERPL-CCNC: 113 U/L (ref 45–117)
ALT SERPL-CCNC: 28 U/L (ref 12–78)
ANION GAP SERPL CALC-SCNC: 13 MMOL/L (ref 5–15)
AST SERPL-CCNC: 21 U/L (ref 15–37)
BASOPHILS # BLD: 0.1 K/UL (ref 0–0.1)
BASOPHILS NFR BLD: 1 % (ref 0–1)
BILIRUB SERPL-MCNC: 0.8 MG/DL (ref 0.2–1)
BNP SERPL-MCNC: 249 PG/ML (ref 0–450)
BUN SERPL-MCNC: 10 MG/DL (ref 6–20)
BUN/CREAT SERPL: 12 (ref 12–20)
CALCIUM SERPL-MCNC: 9.2 MG/DL (ref 8.5–10.1)
CHLORIDE SERPL-SCNC: 101 MMOL/L (ref 97–108)
CO2 SERPL-SCNC: 27 MMOL/L (ref 21–32)
COMMENT, HOLDF: NORMAL
CREAT SERPL-MCNC: 0.82 MG/DL (ref 0.55–1.02)
DIFFERENTIAL METHOD BLD: ABNORMAL
EOSINOPHIL # BLD: 0.3 K/UL (ref 0–0.4)
EOSINOPHIL NFR BLD: 3 % (ref 0–7)
ERYTHROCYTE [DISTWIDTH] IN BLOOD BY AUTOMATED COUNT: 13.1 % (ref 11.5–14.5)
GLOBULIN SER CALC-MCNC: 3.5 G/DL (ref 2–4)
GLUCOSE SERPL-MCNC: 103 MG/DL (ref 65–100)
HCT VFR BLD AUTO: 39.9 % (ref 35–47)
HGB BLD-MCNC: 13.5 G/DL (ref 11.5–16)
IMM GRANULOCYTES # BLD AUTO: 0 K/UL (ref 0–0.04)
IMM GRANULOCYTES NFR BLD AUTO: 0 % (ref 0–0.5)
LACTATE SERPL-SCNC: 1.6 MMOL/L (ref 0.4–2)
LYMPHOCYTES # BLD: 1.1 K/UL (ref 0.8–3.5)
LYMPHOCYTES NFR BLD: 14 % (ref 12–49)
MCH RBC QN AUTO: 33.5 PG (ref 26–34)
MCHC RBC AUTO-ENTMCNC: 33.8 G/DL (ref 30–36.5)
MCV RBC AUTO: 99 FL (ref 80–99)
MONOCYTES # BLD: 0.6 K/UL (ref 0–1)
MONOCYTES NFR BLD: 8 % (ref 5–13)
NEUTS SEG # BLD: 6.1 K/UL (ref 1.8–8)
NEUTS SEG NFR BLD: 74 % (ref 32–75)
NRBC # BLD: 0 K/UL (ref 0–0.01)
NRBC BLD-RTO: 0 PER 100 WBC
PLATELET # BLD AUTO: 220 K/UL (ref 150–400)
PMV BLD AUTO: 8.8 FL (ref 8.9–12.9)
POTASSIUM SERPL-SCNC: 3.5 MMOL/L (ref 3.5–5.1)
PROT SERPL-MCNC: 7.4 G/DL (ref 6.4–8.2)
RBC # BLD AUTO: 4.03 M/UL (ref 3.8–5.2)
SAMPLES BEING HELD,HOLD: NORMAL
SODIUM SERPL-SCNC: 141 MMOL/L (ref 136–145)
WBC # BLD AUTO: 8.2 K/UL (ref 3.6–11)

## 2021-04-26 PROCEDURE — 83605 ASSAY OF LACTIC ACID: CPT

## 2021-04-26 PROCEDURE — A9270 NON-COVERED ITEM OR SERVICE: HCPCS | Performed by: EMERGENCY MEDICINE

## 2021-04-26 PROCEDURE — 85025 COMPLETE CBC W/AUTO DIFF WBC: CPT

## 2021-04-26 PROCEDURE — 36415 COLL VENOUS BLD VENIPUNCTURE: CPT

## 2021-04-26 PROCEDURE — 99283 EMERGENCY DEPT VISIT LOW MDM: CPT

## 2021-04-26 PROCEDURE — 74011000250 HC RX REV CODE- 250: Performed by: EMERGENCY MEDICINE

## 2021-04-26 PROCEDURE — 94640 AIRWAY INHALATION TREATMENT: CPT

## 2021-04-26 PROCEDURE — 83880 ASSAY OF NATRIURETIC PEPTIDE: CPT

## 2021-04-26 PROCEDURE — 77030029684 HC NEB SM VOL KT MONA -A

## 2021-04-26 PROCEDURE — 80053 COMPREHEN METABOLIC PANEL: CPT

## 2021-04-26 PROCEDURE — 87040 BLOOD CULTURE FOR BACTERIA: CPT

## 2021-04-26 PROCEDURE — 71046 X-RAY EXAM CHEST 2 VIEWS: CPT

## 2021-04-26 PROCEDURE — 74011636637 HC RX REV CODE- 636/637: Performed by: EMERGENCY MEDICINE

## 2021-04-26 RX ORDER — PREDNISONE 10 MG/1
30 TABLET ORAL
Qty: 9 TAB | Refills: 0 | Status: SHIPPED | OUTPATIENT
Start: 2021-04-27 | End: 2021-04-30

## 2021-04-26 RX ORDER — IPRATROPIUM BROMIDE AND ALBUTEROL SULFATE 2.5; .5 MG/3ML; MG/3ML
3 SOLUTION RESPIRATORY (INHALATION)
COMMUNITY
Start: 2021-02-18 | End: 2021-04-26 | Stop reason: SDUPTHER

## 2021-04-26 RX ORDER — ROFLUMILAST 500 UG/1
TABLET ORAL
COMMUNITY
Start: 2021-02-19 | End: 2022-05-27

## 2021-04-26 RX ORDER — IPRATROPIUM BROMIDE AND ALBUTEROL SULFATE 2.5; .5 MG/3ML; MG/3ML
3 SOLUTION RESPIRATORY (INHALATION)
Status: COMPLETED | OUTPATIENT
Start: 2021-04-26 | End: 2021-04-26

## 2021-04-26 RX ORDER — FLUTICASONE FUROATE, UMECLIDINIUM BROMIDE AND VILANTEROL TRIFENATATE 200; 62.5; 25 UG/1; UG/1; UG/1
1 POWDER RESPIRATORY (INHALATION) DAILY
COMMUNITY
Start: 2021-02-19 | End: 2022-02-19

## 2021-04-26 RX ORDER — PREDNISONE 20 MG/1
60 TABLET ORAL
Status: COMPLETED | OUTPATIENT
Start: 2021-04-26 | End: 2021-04-26

## 2021-04-26 RX ORDER — AZITHROMYCIN 250 MG/1
TABLET, FILM COATED ORAL
Qty: 6 TAB | Refills: 0 | Status: SHIPPED | OUTPATIENT
Start: 2021-04-26 | End: 2022-05-27

## 2021-04-26 RX ORDER — ALBUTEROL SULFATE 0.83 MG/ML
2.5 SOLUTION RESPIRATORY (INHALATION)
COMMUNITY
Start: 2021-03-22 | End: 2021-04-26

## 2021-04-26 RX ORDER — ALBUTEROL SULFATE 90 UG/1
2 AEROSOL, METERED RESPIRATORY (INHALATION)
COMMUNITY
Start: 2020-08-27

## 2021-04-26 RX ORDER — DILTIAZEM HYDROCHLORIDE 180 MG/1
CAPSULE, COATED, EXTENDED RELEASE ORAL
COMMUNITY
Start: 2021-04-19 | End: 2022-05-27

## 2021-04-26 RX ORDER — IPRATROPIUM BROMIDE AND ALBUTEROL SULFATE 2.5; .5 MG/3ML; MG/3ML
3 SOLUTION RESPIRATORY (INHALATION)
Qty: 30 NEBULE | Refills: 1 | Status: SHIPPED | OUTPATIENT
Start: 2021-04-26 | End: 2022-05-28

## 2021-04-26 RX ORDER — HYDROGEN PEROXIDE 3 %
20 SOLUTION, NON-ORAL MISCELLANEOUS
COMMUNITY

## 2021-04-26 RX ADMIN — IPRATROPIUM BROMIDE AND ALBUTEROL SULFATE 3 ML: .5; 3 SOLUTION RESPIRATORY (INHALATION) at 11:20

## 2021-04-26 RX ADMIN — PREDNISONE 60 MG: 20 TABLET ORAL at 11:05

## 2021-04-26 NOTE — ED PROVIDER NOTES
EMERGENCY DEPARTMENT HISTORY AND PHYSICAL EXAM          Date: 4/26/2021  Patient Name: Charleen Majano    History of Presenting Illness     Chief Complaint   Patient presents with    Wheezing       History Provided By: Patient    HPI: Charleen Majano is a 67 y.o. female, pmhx COPD, hypertension and high cholesterol who presents via private auto from her primary care doctor's office to the ED c/o shortness of breath    Patient explains she has not been feeling well for the past few days with cold symptoms. For the last 2 to 3 days she has been feeling more short of breath and using her nebulizer. Today she states she did get to use her nebulizer because she had a doctor's appointment so she tried her inhaler but notes it did not relieve her symptoms. She has a hoarse voice, shortness of breath, cough but denies any fevers, chills, chest pain, abdominal pain, back pain, nausea, vomiting and diarrhea. Of note, patient fully vaccinated from Covid with the [place] vaccine in February    PCP: Adela Brandon MD   Cardiology and pulmonology Dr. Harman Austin in [facility]      Allergies: Penicillin  Social Hx: -tobacco, -vaping, +EtOH, -Illicit Drugs; There are no other complaints, changes, or physical findings at this time. Current Outpatient Medications   Medication Sig Dispense Refill    albuterol (PROVENTIL HFA, VENTOLIN HFA, PROAIR HFA) 90 mcg/actuation inhaler Take 2 Puffs by inhalation every four (4) hours as needed.  fluticasone-umeclidin-vilanter (Trelegy Ellipta) 200-62.5-25 mcg dsdv Take 1 Puff by inhalation daily.  albuterol-ipratropium (DUO-NEB) 2.5 mg-0.5 mg/3 ml nebu 3 mL by Nebulization route every four (4) hours as needed for Wheezing. 30 Nebule 1    predniSONE (DELTASONE) 10 mg tablet Take 30 mg by mouth daily (with breakfast) for 3 days.  9 Tab 0    azithromycin (Zithromax Z-Tavares) 250 mg tablet 2 tabs today and then 1 tab daily until completed 6 Tab 0    dilTIAZem ER (CARDIZEM CD) 180 mg capsule       esomeprazole (NEXIUM) 20 mg capsule Take 20 mg by mouth.  Daliresp 500 mcg tab tablet TAKE 1/2 TABLET BY MOUTH DAILY FOR 30 DAYS,THEN TAKE 1 TAB DAILY      levoFLOXacin (LEVAQUIN) 750 mg tablet Take 1 Tab by mouth daily. Indications: pneumonia 5 Tab 0    sodium chloride 3 % nebulizer solution TAKE 4 ML BY NEBULIZATION DAILY      potassium chloride SR (K-TAB) 20 mEq tablet TAKE 1 TABLET BY MOUTH ONCE DAILY FOR LOW POTASSIUM      Tobradex ophthalmic ointment APPLY TO EACH EYE AT BEDTIME AS NEEDED      Oxygen 1 Each by Nasal route daily as needed. Uses one liter of oxygen as needed      amLODIPine (NORVASC) 5 mg tablet Indications: High Blood Pressure Disorder 1 pill daily for blood pressure      valACYclovir (VALTREX) 500 mg tablet 1 pill twice daily for 5 days if needed for outbreak      montelukast (SINGULAIR) 10 mg tablet Take 10 mg by mouth At bedtime.  atorvastatin (LIPITOR) 10 mg tablet Take 10 mg by mouth daily.  multivitamin (MULTIPLE VITAMINS PO) Take  by mouth.  loratadine (CLARITIN) 10 mg tablet Take 1 Tab by mouth daily. Indications: ALLERGIC RHINITIS 30 Tab 4    fluticasone (FLONASE) 50 mcg/actuation nasal spray One spray per nostril twice a day  Indications: ALLERGIC RHINITIS 1 Bottle 5    naproxen sodium (ALEVE) 220 mg cap Take  by mouth.          Past History     Past Medical History:  Past Medical History:   Diagnosis Date    Chronic obstructive pulmonary disease ([facility] [address]     Environmental and seasonal allergies     GERD (gastroesophageal reflux disease)     Herpes     Genital    Hx of colonoscopy 05/22/2017    Hypertension     OA (osteoarthritis)        Past Surgical History:  Past Surgical History:   Procedure Laterality Date    HX BREAST AUGMENTATION      HX CATARACT REMOVAL Bilateral     [facility]  [facility]    HX COLONOSCOPY  05/22/2017    nl    HX ORTHOPAEDIC Left 10/14/2016    left foot planter fibroma    HX ORTHOPAEDIC      right hand fibroma    HX TUBAL LIGATION      IMPLANT BREAST SILICONE/EQ         Family History:  Family History   Problem Relation Age of Onset    Heart Disease Father     Hypertension Mother     Liver Disease Brother         Hepatitis C    Heart Failure Brother        Social History:  Social History     Tobacco Use    Smoking status: Former Smoker     Quit date: 1995     Years since quittin.9    Smokeless tobacco: Never Used   Substance Use Topics    Alcohol use: Yes     Alcohol/week: 63.0 standard drinks     Types: 21 Glasses of wine, 42 Shots of liquor per week     Comment: 6 drinks per night    Drug use: No       Allergies: Allergies   Allergen Reactions    Pcn [Penicillins] Unknown (comments)         Review of Systems   Review of Systems   Constitutional: Positive for chills. Negative for activity change, appetite change, fever and unexpected weight change. HENT: Negative for congestion. Eyes: Negative for pain and visual disturbance. Respiratory: Positive for cough and shortness of breath. Cardiovascular: Negative for chest pain. Gastrointestinal: Negative for abdominal pain, diarrhea, nausea and vomiting. Genitourinary: Negative for dysuria. Musculoskeletal: Negative for back pain. Skin: Negative for rash. Neurological: Negative for headaches. Physical Exam   Physical Exam  Vitals signs and nursing note reviewed. Constitutional:       Appearance: She is well-developed. She is not diaphoretic. Comments: Elderly female with elevated blood pressure and heart rate, currently in mild to moderate distress   HENT:      Head: Normocephalic and atraumatic. Eyes:      General:         Right eye: No discharge. Left eye: No discharge. Conjunctiva/sclera:      Right eye: Right conjunctiva is injected. Left eye: Left conjunctiva is injected. Pupils: Pupils are equal, round, and reactive to light.    Neck:      Musculoskeletal: Normal range of motion and neck supple. Cardiovascular:      Rate and Rhythm: Regular rhythm. Tachycardia present. Heart sounds: Normal heart sounds. No murmur. Pulmonary:      Effort: Pulmonary effort is normal. No respiratory distress. Breath sounds: No stridor. Rales (Bilateral bases) present. No wheezing or rhonchi. Comments: Mild bronchospasm noted during exam but air movement appears appropriate without any evidence of tachypnea  Abdominal:      General: Bowel sounds are normal. There is no distension. Palpations: Abdomen is soft. Tenderness: There is no abdominal tenderness. Musculoskeletal: Normal range of motion. Right lower leg: No edema. Left lower leg: No edema. Skin:     General: Skin is warm and dry. Findings: No rash. Neurological:      Mental Status: She is alert and oriented to person, place, and time. Cranial Nerves: No cranial nerve deficit. Motor: No abnormal muscle tone. Diagnostic Study Results     Labs -     No results found for this or any previous visit (from the past 12 hour(s)). Radiologic Studies -   XR CHEST PA LAT   Final Result   No acute abnormality. CT Results  (Last 48 hours)    None        CXR Results  (Last 48 hours)               04/26/21 1146  XR CHEST PA LAT Final result    Impression:  No acute abnormality. Narrative:  Exam:  2 view chest       Indication: COPD exacerbation, shortness of breath       Comparison to 12/6/2020. PA and lateral views demonstrate normal heart size. Scarring is noted in the   right upper lobe due to resolution of prior infiltrate. There is no acute   process in the lung fields. Degenerative changes are seen in the thoracic spine. Medical Decision Making   I am the first provider for this patient. I reviewed the vital signs, available nursing notes, past medical history, past surgical history, family history and social history.     Vital Signs-Reviewed the patient's vital signs. Patient Vitals for the past 24 hrs:   Temp Pulse Resp BP SpO2   04/26/21 1218 -- 86 -- (!) 172/86 98 %   04/26/21 1039 98.3 °F (36.8 °C) (!) 111 22 (!) 195/92 100 %       Pulse Oximetry Analysis - 100% on RA    Cardiac Monitor:   Rate: 105bpm  Rhythm: Sinus Tachycardia      Records Reviewed: Nursing Notes, Old Medical Records and Previous electrocardiograms, previous laboratory results and radiographic results. Patient's last admission to the hospital 12/20/2020 for COPD exacerbation with pneumonia    Provider Notes (Medical Decision Making):   MDM: Elderly female with COPD presenting for worsening shortness of breath with notably elevated heart rate and blood pressure. She is afebrile so we will hold sepsis order set at this time but blood cultures and lactic acid have been sent in case her white blood count is elevated. ED Course:   Initial assessment performed. The patients presenting problems have been discussed, and they are in agreement with the care plan formulated and outlined with them. I have encouraged them to ask questions as they arise throughout their visit. PROGRESS NOTE:    ED Course as of Apr 27 0755   Mon Apr 26, 2021   1210 Patient states she feels better after nebulizer treatment. Lab results reviewed without any acute findings. X-ray without any obvious pneumonia. Await repeat vitals for disposition plan. [JT]   1220 All results reviewed without any evidence of pneumonia. Patient's lungs are wide open and her saturation remains perfect. Heart rate is now at 80s and she appears stable. Discussed home care with primary care follow-up. [JT]      ED Course User Index  [JT] Shannan Harkins MD        Discharge note:  Pt re-evaluated and noted to be feeling better, ready for discharge. Updated pt on all final lab and x-ray findings. Will follow up as instructed.  All questions have been answered, pt voiced understanding and agreement with plan. Abx were prescribed, pt advised that diarrhea and rash are possible side effects of the medications. Specific return precautions provided as well as instructions to return to the ED should sx worsen at any time. Vital signs stable for discharge. Critical Care Time:   0      Diagnosis     Clinical Impression:   1. Acute bronchitis, unspecified organism        PLAN:  1. Discharge Medication List as of 4/26/2021 12:21 PM      START taking these medications    Details   predniSONE (DELTASONE) 10 mg tablet Take 30 mg by mouth daily (with breakfast) for 3 days. , Normal, Disp-9 Tab, R-0      azithromycin (Zithromax Z-Tavares) 250 mg tablet 2 tabs today and then 1 tab daily until completed, Normal, Disp-6 Tab, R-0         CONTINUE these medications which have CHANGED    Details   albuterol-ipratropium (DUO-NEB) 2.5 mg-0.5 mg/3 ml nebu 3 mL by Nebulization route every four (4) hours as needed for Wheezing., Normal, Disp-30 Nebule, R-1         CONTINUE these medications which have NOT CHANGED    Details   albuterol (PROVENTIL HFA, VENTOLIN HFA, PROAIR HFA) 90 mcg/actuation inhaler Take 2 Puffs by inhalation every four (4) hours as needed., Historical Med      fluticasone-umeclidin-vilanter (Trelegy Ellipta) 200-62.5-25 mcg dsdv Take 1 Puff by inhalation daily. , Historical Med      dilTIAZem ER (CARDIZEM CD) 180 mg capsule Historical Med      esomeprazole (NEXIUM) 20 mg capsule Take 20 mg by mouth., Historical Med      Daliresp 500 mcg tab tablet TAKE 1/2 TABLET BY MOUTH DAILY FOR 30 DAYS,THEN TAKE 1 TAB DAILY, Historical Med, SHAW      levoFLOXacin (LEVAQUIN) 750 mg tablet Take 1 Tab by mouth daily.  Indications: pneumonia, Normal, Disp-5 Tab,R-0      sodium chloride 3 % nebulizer solution TAKE 4 ML BY NEBULIZATION DAILY, Historical Med      potassium chloride SR (K-TAB) 20 mEq tablet TAKE 1 TABLET BY MOUTH ONCE DAILY FOR LOW POTASSIUM, Historical Med      Tobradex ophthalmic ointment APPLY TO EACH EYE AT BEDTIME AS NEEDED, Historical Med, SHAW      Oxygen 1 Each by Nasal route daily as needed. Uses one liter of oxygen as needed, Historical Med      amLODIPine (NORVASC) 5 mg tablet Indications: High Blood Pressure Disorder 1 pill daily for blood pressure, Historical Med      valACYclovir (VALTREX) 500 mg tablet 1 pill twice daily for 5 days if needed for outbreak, Historical Med      montelukast (SINGULAIR) 10 mg tablet Take 10 mg by mouth At bedtime. , Historical Med      atorvastatin (LIPITOR) 10 mg tablet Take 10 mg by mouth daily. , Historical Med      multivitamin (MULTIPLE VITAMINS PO) Take  by mouth., Historical Med      loratadine (CLARITIN) 10 mg tablet Take 1 Tab by mouth daily. Indications: ALLERGIC RHINITIS, Normal, Disp-30 Tab, R-4      fluticasone (FLONASE) 50 mcg/actuation nasal spray One spray per nostril twice a day  Indications: ALLERGIC RHINITIS, Normal, Disp-1 Bottle, R-5      naproxen sodium (ALEVE) 220 mg cap Take  by mouth., Historical Med           2. Follow-up Information     Follow up With Specialties Details Why Contact Info    Adela Brandon MD Family Medicine Schedule an appointment as soon as possible for a visit  if your symptoms do not improve [address] [address]  [phone]      [address] [facility]  If symptoms worsen [address]  [phone]        Return to ED if worse     Disposition:  Home       Please note, this dictation was completed with [employer], the computer voice recognition software. Quite often unanticipated grammatical, syntax, homophones, and other interpretive errors are inadvertently transcribed by the computer software. Please disregard these errors. Please excuse any errors that have escaped final proof reading.

## 2021-05-02 LAB
BACTERIA SPEC CULT: NORMAL
SERVICE CMNT-IMP: NORMAL

## 2021-07-05 ENCOUNTER — APPOINTMENT (OUTPATIENT)
Dept: GENERAL RADIOLOGY | Age: 76
End: 2021-07-05
Attending: EMERGENCY MEDICINE
Payer: MEDICARE

## 2021-07-05 ENCOUNTER — HOSPITAL ENCOUNTER (EMERGENCY)
Age: 76
Discharge: HOME | End: 2021-07-05
Attending: EMERGENCY MEDICINE
Payer: MEDICARE

## 2021-07-05 VITALS
OXYGEN SATURATION: 98 % | WEIGHT: 126 LBS | BODY MASS INDEX: 23.19 KG/M2 | HEART RATE: 100 BPM | TEMPERATURE: 99.4 F | RESPIRATION RATE: 18 BRPM | SYSTOLIC BLOOD PRESSURE: 159 MMHG | HEIGHT: 62 IN | DIASTOLIC BLOOD PRESSURE: 68 MMHG

## 2021-07-05 DIAGNOSIS — J44.1 COPD EXACERBATION (HCC): Primary | ICD-10-CM

## 2021-07-05 LAB
ALBUMIN SERPL-MCNC: 3.8 G/DL (ref 3.5–5)
ALBUMIN/GLOB SERPL: 1.1 {RATIO} (ref 1.1–2.2)
ALP SERPL-CCNC: 103 U/L (ref 45–117)
ALT SERPL-CCNC: 88 U/L (ref 12–78)
ANION GAP SERPL CALC-SCNC: 13 MMOL/L (ref 5–15)
AST SERPL-CCNC: 43 U/L (ref 15–37)
BASOPHILS # BLD: 0 K/UL (ref 0–0.1)
BASOPHILS NFR BLD: 0 % (ref 0–1)
BILIRUB SERPL-MCNC: 0.4 MG/DL (ref 0.2–1)
BNP SERPL-MCNC: 236 PG/ML (ref 0–450)
BUN SERPL-MCNC: 19 MG/DL (ref 6–20)
BUN/CREAT SERPL: 19 (ref 12–20)
CALCIUM SERPL-MCNC: 9.3 MG/DL (ref 8.5–10.1)
CHLORIDE SERPL-SCNC: 100 MMOL/L (ref 97–108)
CO2 SERPL-SCNC: 23 MMOL/L (ref 21–32)
CREAT SERPL-MCNC: 0.98 MG/DL (ref 0.55–1.02)
DIFFERENTIAL METHOD BLD: ABNORMAL
EOSINOPHIL # BLD: 0 K/UL (ref 0–0.4)
EOSINOPHIL NFR BLD: 0 % (ref 0–7)
ERYTHROCYTE [DISTWIDTH] IN BLOOD BY AUTOMATED COUNT: 13.2 % (ref 11.5–14.5)
GLOBULIN SER CALC-MCNC: 3.6 G/DL (ref 2–4)
GLUCOSE SERPL-MCNC: 105 MG/DL (ref 65–100)
HCT VFR BLD AUTO: 39.8 % (ref 35–47)
HGB BLD-MCNC: 13.5 G/DL (ref 11.5–16)
IMM GRANULOCYTES # BLD AUTO: 0.1 K/UL (ref 0–0.04)
IMM GRANULOCYTES NFR BLD AUTO: 1 % (ref 0–0.5)
LYMPHOCYTES # BLD: 0.8 K/UL (ref 0.8–3.5)
LYMPHOCYTES NFR BLD: 7 % (ref 12–49)
MCH RBC QN AUTO: 33.6 PG (ref 26–34)
MCHC RBC AUTO-ENTMCNC: 33.9 G/DL (ref 30–36.5)
MCV RBC AUTO: 99 FL (ref 80–99)
MONOCYTES # BLD: 0.8 K/UL (ref 0–1)
MONOCYTES NFR BLD: 7 % (ref 5–13)
NEUTS SEG # BLD: 9 K/UL (ref 1.8–8)
NEUTS SEG NFR BLD: 85 % (ref 32–75)
NRBC # BLD: 0 K/UL (ref 0–0.01)
NRBC BLD-RTO: 0 PER 100 WBC
PLATELET # BLD AUTO: 268 K/UL (ref 150–400)
PMV BLD AUTO: 8.9 FL (ref 8.9–12.9)
POTASSIUM SERPL-SCNC: 4.4 MMOL/L (ref 3.5–5.1)
PROT SERPL-MCNC: 7.4 G/DL (ref 6.4–8.2)
RBC # BLD AUTO: 4.02 M/UL (ref 3.8–5.2)
RBC MORPH BLD: ABNORMAL
SODIUM SERPL-SCNC: 136 MMOL/L (ref 136–145)
TROPONIN I SERPL-MCNC: <0.05 NG/ML
WBC # BLD AUTO: 10.7 K/UL (ref 3.6–11)

## 2021-07-05 PROCEDURE — 84484 ASSAY OF TROPONIN QUANT: CPT

## 2021-07-05 PROCEDURE — 74011250636 HC RX REV CODE- 250/636: Performed by: EMERGENCY MEDICINE

## 2021-07-05 PROCEDURE — 94640 AIRWAY INHALATION TREATMENT: CPT

## 2021-07-05 PROCEDURE — 36415 COLL VENOUS BLD VENIPUNCTURE: CPT

## 2021-07-05 PROCEDURE — 83880 ASSAY OF NATRIURETIC PEPTIDE: CPT

## 2021-07-05 PROCEDURE — 96374 THER/PROPH/DIAG INJ IV PUSH: CPT

## 2021-07-05 PROCEDURE — 85025 COMPLETE CBC W/AUTO DIFF WBC: CPT

## 2021-07-05 PROCEDURE — 80053 COMPREHEN METABOLIC PANEL: CPT

## 2021-07-05 PROCEDURE — 99283 EMERGENCY DEPT VISIT LOW MDM: CPT

## 2021-07-05 PROCEDURE — 71046 X-RAY EXAM CHEST 2 VIEWS: CPT

## 2021-07-05 PROCEDURE — 77030029684 HC NEB SM VOL KT MONA -A

## 2021-07-05 PROCEDURE — 74011000250 HC RX REV CODE- 250: Performed by: EMERGENCY MEDICINE

## 2021-07-05 RX ORDER — PREDNISONE 20 MG/1
60 TABLET ORAL DAILY
Qty: 12 TABLET | Refills: 0 | Status: SHIPPED | OUTPATIENT
Start: 2021-07-05 | End: 2021-07-09

## 2021-07-05 RX ORDER — IPRATROPIUM BROMIDE AND ALBUTEROL SULFATE 2.5; .5 MG/3ML; MG/3ML
3 SOLUTION RESPIRATORY (INHALATION)
Status: COMPLETED | OUTPATIENT
Start: 2021-07-05 | End: 2021-07-05

## 2021-07-05 RX ORDER — IPRATROPIUM BROMIDE AND ALBUTEROL SULFATE 2.5; .5 MG/3ML; MG/3ML
3 SOLUTION RESPIRATORY (INHALATION)
Status: DISCONTINUED | OUTPATIENT
Start: 2021-07-05 | End: 2021-07-05 | Stop reason: SDUPTHER

## 2021-07-05 RX ORDER — IPRATROPIUM BROMIDE AND ALBUTEROL SULFATE 2.5; .5 MG/3ML; MG/3ML
3 SOLUTION RESPIRATORY (INHALATION)
Status: DISCONTINUED | OUTPATIENT
Start: 2021-07-05 | End: 2021-07-05

## 2021-07-05 RX ADMIN — IPRATROPIUM BROMIDE AND ALBUTEROL SULFATE 3 ML: .5; 3 SOLUTION RESPIRATORY (INHALATION) at 17:14

## 2021-07-05 RX ADMIN — IPRATROPIUM BROMIDE AND ALBUTEROL SULFATE 3 ML: .5; 3 SOLUTION RESPIRATORY (INHALATION) at 17:16

## 2021-07-05 RX ADMIN — METHYLPREDNISOLONE SODIUM SUCCINATE 125 MG: 125 INJECTION, POWDER, FOR SOLUTION INTRAMUSCULAR; INTRAVENOUS at 17:31

## 2021-07-05 NOTE — ED PROVIDER NOTES
EMERGENCY DEPARTMENT HISTORY AND PHYSICAL EXAM          Date: 2021  Patient Name: Rodríguez Pollard    History of Presenting Illness     Chief Complaint   Patient presents with    Wheezing       History Provided By: Patient    HPI: Rodríguez Pollard is a 67 y.o. female, pmhx listed below, who presents to the ED c/o wheezing, shortness of breath. Patient reports symptoms have been present for about a week. Reports she has been using nebs at home as well as her rescue inhaler with no relief. Patient reports she did a steroid taper over the last week, today was the last day. No fever. Some coughing. Denies leg swelling. History of similar symptoms in the past with her COPD. Patient is vaccinated against COVID-19. PCP: Fior Mckinney MD    There are no other complaints, changes, or physical findings at this time.          Past History       Past Medical History:  Past Medical History:   Diagnosis Date    Chronic obstructive pulmonary disease ([address]     Environmental and seasonal allergies     GERD (gastroesophageal reflux disease)     Herpes     Genital    Hx of colonoscopy 2017    Hypertension     OA (osteoarthritis)        Past Surgical History:  Past Surgical History:   Procedure Laterality Date    HX BREAST AUGMENTATION      HX CATARACT REMOVAL Bilateral     [facility]X COLONOSCOPY      [facility]    HX COLONOSCOPY  2017    nl    HX ORTHOPAEDIC Left 10/14/2016    left foot planter fibroma    HX ORTHOPAEDIC      right hand fibroma    HX TUBAL LIGATION      IMPLANT BREAST SILICONE/EQ         Family History:  Family History   Problem Relation Age of Onset    Heart Disease Father     Hypertension Mother     Liver Disease Brother         Hepatitis C    Heart Failure Brother        Social History:  Social History     Tobacco Use    Smoking status: Former Smoker     Quit date: 1995     Years since quittin.1    Smokeless tobacco: Never Used   Substance Use Topics    Alcohol use: Yes     Alcohol/week: 63.0 standard drinks     Types: 21 Glasses of wine, 42 Shots of liquor per week     Comment: 6 drinks per night    Drug use: No       Current Outpatient Medications   Medication Sig Dispense Refill    predniSONE (DELTASONE) 20 mg tablet Take 60 mg by mouth daily for 4 days. With Breakfast 12 Tablet 0    albuterol (PROVENTIL HFA, VENTOLIN HFA, PROAIR HFA) 90 mcg/actuation inhaler Take 2 Puffs by inhalation every four (4) hours as needed.  dilTIAZem ER (CARDIZEM CD) 180 mg capsule       fluticasone-umeclidin-vilanter (Trelegy Ellipta) 200-62.5-25 mcg dsdv Take 1 Puff by inhalation daily.  esomeprazole (NEXIUM) 20 mg capsule Take 20 mg by mouth.  Daliresp 500 mcg tab tablet TAKE 1/2 TABLET BY MOUTH DAILY FOR 30 DAYS,THEN TAKE 1 TAB DAILY      albuterol-ipratropium (DUO-NEB) 2.5 mg-0.5 mg/3 ml nebu 3 mL by Nebulization route every four (4) hours as needed for Wheezing. 30 Nebule 1    azithromycin (Zithromax Z-Tavares) 250 mg tablet 2 tabs today and then 1 tab daily until completed 6 Tab 0    levoFLOXacin (LEVAQUIN) 750 mg tablet Take 1 Tab by mouth daily. Indications: pneumonia 5 Tab 0    sodium chloride 3 % nebulizer solution TAKE 4 ML BY NEBULIZATION DAILY      potassium chloride SR (K-TAB) 20 mEq tablet TAKE 1 TABLET BY MOUTH ONCE DAILY FOR LOW POTASSIUM      Tobradex ophthalmic ointment APPLY TO EACH EYE AT BEDTIME AS NEEDED      Oxygen 1 Each by Nasal route daily as needed. Uses one liter of oxygen as needed      amLODIPine (NORVASC) 5 mg tablet Indications: High Blood Pressure Disorder 1 pill daily for blood pressure      valACYclovir (VALTREX) 500 mg tablet 1 pill twice daily for 5 days if needed for outbreak      montelukast (SINGULAIR) 10 mg tablet Take 10 mg by mouth At bedtime.  atorvastatin (LIPITOR) 10 mg tablet Take 10 mg by mouth daily.  multivitamin (MULTIPLE VITAMINS PO) Take  by mouth.       loratadine (CLARITIN) 10 mg tablet Take 1 Tab by mouth daily. Indications: ALLERGIC RHINITIS 30 Tab 4    fluticasone (FLONASE) 50 mcg/actuation nasal spray One spray per nostril twice a day  Indications: ALLERGIC RHINITIS 1 Bottle 5    naproxen sodium (ALEVE) 220 mg cap Take  by mouth. Allergies: Allergies   Allergen Reactions    Pcn [Penicillins] Unknown (comments)         Review of Systems   Review of Systems   Constitutional: Negative for chills and fever. HENT: Negative for congestion. Eyes: Negative for pain. Respiratory: Positive for cough, shortness of breath and wheezing. Cardiovascular: Negative for chest pain. Gastrointestinal: Negative for abdominal pain. Genitourinary: Negative for flank pain. Musculoskeletal: Negative for back pain. Neurological: Negative for headaches. Psychiatric/Behavioral: Negative for agitation. Physical Exam     Vital Signs-Reviewed the patient's vital signs. Patient Vitals for the past 12 hrs:   Temp Pulse Resp BP SpO2   07/05/21 1733 -- 89 20 (!) 194/88 95 %   07/05/21 1717 -- -- -- -- 94 %   07/05/21 1710 -- -- -- -- 95 %   07/05/21 1635 -- -- -- -- 95 %   07/05/21 1634 99.4 °F (37.4 °C) 98 20 (!) 199/107 95 %       Physical Exam  Constitutional:       Appearance: Normal appearance. HENT:      Head: Normocephalic and atraumatic. Mouth/Throat:      Mouth: Mucous membranes are moist.   Eyes:      Pupils: Pupils are equal, round, and reactive to light. Cardiovascular:      Rate and Rhythm: Normal rate and regular rhythm. Pulmonary:      Comments: Mild expiratory wheezing with crackles noted at bilateral bases. Good air movement. Abdominal:      Tenderness: There is no abdominal tenderness. Musculoskeletal:         General: No swelling. Right lower leg: No edema. Left lower leg: No edema. Skin:     General: Skin is warm and dry. Neurological:      Mental Status: She is alert and oriented to person, place, and time. Motor: No weakness. Psychiatric:         Mood and Affect: Mood normal.         Diagnostic Study Results     Labs -     Recent Results (from the past 12 hour(s))   CBC WITH AUTOMATED DIFF    Collection Time: 07/05/21  5:11 PM   Result Value Ref Range    WBC 10.7 3.6 - 11.0 K/uL    RBC 4.02 3.80 - 5.20 M/uL    HGB 13.5 11.5 - 16.0 g/dL    HCT 39.8 35.0 - 47.0 %    MCV 99.0 80.0 - 99.0 FL    MCH 33.6 26.0 - 34.0 PG    MCHC 33.9 30.0 - 36.5 g/dL    RDW 13.2 11.5 - 14.5 %    PLATELET 827 498 - 511 K/uL    MPV 8.9 8.9 - 12.9 FL    NRBC 0.0 0  WBC    ABSOLUTE NRBC 0.00 0.00 - 0.01 K/uL    NEUTROPHILS 85 (H) 32 - 75 %    LYMPHOCYTES 7 (L) 12 - 49 %    MONOCYTES 7 5 - 13 %    EOSINOPHILS 0 0 - 7 %    BASOPHILS 0 0 - 1 %    IMMATURE GRANULOCYTES 1 (H) 0.0 - 0.5 %    ABS. NEUTROPHILS 9.0 (H) 1.8 - 8.0 K/UL    ABS. LYMPHOCYTES 0.8 0.8 - 3.5 K/UL    ABS. MONOCYTES 0.8 0.0 - 1.0 K/UL    ABS. EOSINOPHILS 0.0 0.0 - 0.4 K/UL    ABS. BASOPHILS 0.0 0.0 - 0.1 K/UL    ABS. IMM. GRANS. 0.1 (H) 0.00 - 0.04 K/UL    DF AUTOMATED      RBC COMMENTS NORMOCYTIC, NORMOCHROMIC     METABOLIC PANEL, COMPREHENSIVE    Collection Time: 07/05/21  5:11 PM   Result Value Ref Range    Sodium 136 136 - 145 mmol/L    Potassium 4.4 3.5 - 5.1 mmol/L    Chloride 100 97 - 108 mmol/L    CO2 23 21 - 32 mmol/L    Anion gap 13 5 - 15 mmol/L    Glucose 105 (H) 65 - 100 mg/dL    BUN 19 6 - 20 MG/DL    Creatinine 0.98 0.55 - 1.02 MG/DL    BUN/Creatinine ratio 19 12 - 20      GFR est AA >60 >60 ml/min/1.73m2    GFR est non-AA 55 (L) >60 ml/min/1.73m2    Calcium 9.3 8.5 - 10.1 MG/DL    Bilirubin, total 0.4 0.2 - 1.0 MG/DL    ALT (SGPT) 88 (H) 12 - 78 U/L    AST (SGOT) 43 (H) 15 - 37 U/L    Alk.  phosphatase 103 45 - 117 U/L    Protein, total 7.4 6.4 - 8.2 g/dL    Albumin 3.8 3.5 - 5.0 g/dL    Globulin 3.6 2.0 - 4.0 g/dL    A-G Ratio 1.1 1.1 - 2.2     TROPONIN I    Collection Time: 07/05/21  5:11 PM   Result Value Ref Range    Troponin-I, Qt. <0.05 <0.05 ng/mL   NT-PRO BNP Collection Time: 07/05/21  5:11 PM   Result Value Ref Range    NT pro- 0 - 450 PG/ML       Radiologic Studies -   XR CHEST PA LAT   Final Result   No acute findings. CT Results  (Last 48 hours)    None        CXR Results  (Last 48 hours)               07/05/21 1746  XR CHEST PA LAT Final result    Impression:  No acute findings. Narrative:  History: Shortness of breath. Frontal and lateral views of the chest show clear lungs. The heart, mediastinum   and pulmonary vasculature are normal.  The bony thorax is unremarkable. There   are calcified breast implants. There are vascular calcifications. Medical Decision Making   I am the first provider for this patient. I reviewed the vital signs, available nursing notes, past medical history, past surgical history, family history and social history. Records Reviewed: Nursing Notes and Old Medical Records    Provider Notes (Medical Decision Making):   MDM: 69-year-old female with history of COPD. Will give nebulizer treatment and IV steroids. Unclear disposition pending response to treatment. Also consider ACS, CHF, pneumonia. Lab work and x-ray pending. Initial assessment performed. The patients presenting problems have been discussed, and they are in agreement with the care plan formulated and outlined with them. I have encouraged them to ask questions as they arise throughout their visit. PROGRESS NOTE:    Patient feels better after nebulizer treatments here in steroid dose. Will give course of steroids for home. Recommend follow-up with primary care doctor. Discussed return to ED precautions. Discharge note:  Pt re-evaluated and noted to be feeling better, ready for discharge. Updated pt on all final results. Will follow up as instructed. All questions have been answered, pt voiced understanding and agreement with plan.  Specific return precautions provided as well as instructions to return to the ED should sx worsen at any time. Vital signs stable for discharge. Diagnosis     Clinical Impression:   1. COPD exacerbation ([address]            Disposition:  Discharged    Current Discharge Medication List      START taking these medications    Details   predniSONE (DELTASONE) 20 mg tablet Take 60 mg by mouth daily for 4 days. With Breakfast  Qty: 12 Tablet, Refills: 0  Start date: 7/5/2021, End date: 7/9/2021               Please note, this dictation was completed with [employer], the computer voice recognition software. Quite often unanticipated grammatical, syntax, homophones, and other interpretive errors are inadvertently transcribed by the computer software. Please disregard these errors. Please excuse any errors that have escaped final proof reading.

## 2021-07-18 ENCOUNTER — HOSPITAL ENCOUNTER (INPATIENT)
Age: 76
LOS: 4 days | Discharge: ANOTHER HOSPITAL | DRG: 194 | End: 2021-07-22
Attending: EMERGENCY MEDICINE | Admitting: INTERNAL MEDICINE
Payer: MEDICARE

## 2021-07-18 ENCOUNTER — APPOINTMENT (OUTPATIENT)
Dept: GENERAL RADIOLOGY | Age: 76
DRG: 194 | End: 2021-07-18
Attending: EMERGENCY MEDICINE
Payer: MEDICARE

## 2021-07-18 DIAGNOSIS — D72.829 LEUKOCYTOSIS, UNSPECIFIED TYPE: ICD-10-CM

## 2021-07-18 DIAGNOSIS — J18.9 PNEUMONIA OF BOTH UPPER LOBES DUE TO INFECTIOUS ORGANISM: Primary | ICD-10-CM

## 2021-07-18 LAB
ALBUMIN SERPL-MCNC: 2.8 G/DL (ref 3.5–5)
ALBUMIN/GLOB SERPL: 0.7 {RATIO} (ref 1.1–2.2)
ALP SERPL-CCNC: 105 U/L (ref 45–117)
ALT SERPL-CCNC: 46 U/L (ref 12–78)
ANION GAP SERPL CALC-SCNC: 16 MMOL/L (ref 5–15)
APPEARANCE UR: CLEAR
AST SERPL-CCNC: 35 U/L (ref 15–37)
BACTERIA URNS QL MICRO: NEGATIVE /HPF
BASOPHILS # BLD: 0.1 K/UL (ref 0–0.1)
BASOPHILS NFR BLD: 0 % (ref 0–1)
BILIRUB SERPL-MCNC: 1.5 MG/DL (ref 0.2–1)
BILIRUB UR QL: NEGATIVE
BUN SERPL-MCNC: 11 MG/DL (ref 6–20)
BUN/CREAT SERPL: 11 (ref 12–20)
CALCIUM SERPL-MCNC: 8.8 MG/DL (ref 8.5–10.1)
CHLORIDE SERPL-SCNC: 98 MMOL/L (ref 97–108)
CO2 SERPL-SCNC: 20 MMOL/L (ref 21–32)
COLOR UR: ABNORMAL
CREAT SERPL-MCNC: 0.99 MG/DL (ref 0.55–1.02)
DIFFERENTIAL METHOD BLD: ABNORMAL
EOSINOPHIL # BLD: 0.3 K/UL (ref 0–0.4)
EOSINOPHIL NFR BLD: 2 % (ref 0–7)
EPITH CASTS URNS QL MICRO: ABNORMAL /LPF
ERYTHROCYTE [DISTWIDTH] IN BLOOD BY AUTOMATED COUNT: 12.7 % (ref 11.5–14.5)
FLUAV RNA SPEC QL NAA+PROBE: NOT DETECTED
FLUBV RNA SPEC QL NAA+PROBE: NOT DETECTED
GLOBULIN SER CALC-MCNC: 4.3 G/DL (ref 2–4)
GLUCOSE SERPL-MCNC: 102 MG/DL (ref 65–100)
GLUCOSE UR STRIP.AUTO-MCNC: NEGATIVE MG/DL
HCT VFR BLD AUTO: 36.1 % (ref 35–47)
HGB BLD-MCNC: 12.2 G/DL (ref 11.5–16)
HGB UR QL STRIP: NEGATIVE
IMM GRANULOCYTES # BLD AUTO: 0.1 K/UL (ref 0–0.04)
IMM GRANULOCYTES NFR BLD AUTO: 0 % (ref 0–0.5)
KETONES UR QL STRIP.AUTO: ABNORMAL MG/DL
LACTATE SERPL-SCNC: 0.8 MMOL/L (ref 0.4–2)
LEUKOCYTE ESTERASE UR QL STRIP.AUTO: ABNORMAL
LYMPHOCYTES # BLD: 0.9 K/UL (ref 0.8–3.5)
LYMPHOCYTES NFR BLD: 7 % (ref 12–49)
MAGNESIUM SERPL-MCNC: 1.9 MG/DL (ref 1.6–2.4)
MCH RBC QN AUTO: 33.7 PG (ref 26–34)
MCHC RBC AUTO-ENTMCNC: 33.8 G/DL (ref 30–36.5)
MCV RBC AUTO: 99.7 FL (ref 80–99)
MONOCYTES # BLD: 1.3 K/UL (ref 0–1)
MONOCYTES NFR BLD: 10 % (ref 5–13)
NEUTS SEG # BLD: 10.8 K/UL (ref 1.8–8)
NEUTS SEG NFR BLD: 81 % (ref 32–75)
NITRITE UR QL STRIP.AUTO: NEGATIVE
NRBC # BLD: 0 K/UL (ref 0–0.01)
NRBC BLD-RTO: 0 PER 100 WBC
PH UR STRIP: 7 [PH] (ref 5–8)
PHOSPHATE SERPL-MCNC: 2.4 MG/DL (ref 2.6–4.7)
PLATELET # BLD AUTO: 177 K/UL (ref 150–400)
PMV BLD AUTO: 9.8 FL (ref 8.9–12.9)
POTASSIUM SERPL-SCNC: 4 MMOL/L (ref 3.5–5.1)
PROT SERPL-MCNC: 7.1 G/DL (ref 6.4–8.2)
PROT UR STRIP-MCNC: NEGATIVE MG/DL
RBC # BLD AUTO: 3.62 M/UL (ref 3.8–5.2)
RBC #/AREA URNS HPF: ABNORMAL /HPF (ref 0–5)
SARS-COV-2, COV2: NOT DETECTED
SODIUM SERPL-SCNC: 134 MMOL/L (ref 136–145)
SP GR UR REFRACTOMETRY: 1.01 (ref 1–1.03)
TROPONIN I SERPL-MCNC: <0.05 NG/ML
UA: UC IF INDICATED,UAUC: ABNORMAL
UROBILINOGEN UR QL STRIP.AUTO: 1 EU/DL (ref 0.2–1)
WBC # BLD AUTO: 13.3 K/UL (ref 3.6–11)
WBC URNS QL MICRO: ABNORMAL /HPF (ref 0–4)

## 2021-07-18 PROCEDURE — 94640 AIRWAY INHALATION TREATMENT: CPT

## 2021-07-18 PROCEDURE — 85025 COMPLETE CBC W/AUTO DIFF WBC: CPT

## 2021-07-18 PROCEDURE — 87040 BLOOD CULTURE FOR BACTERIA: CPT

## 2021-07-18 PROCEDURE — 77010033678 HC OXYGEN DAILY

## 2021-07-18 PROCEDURE — 96374 THER/PROPH/DIAG INJ IV PUSH: CPT

## 2021-07-18 PROCEDURE — 74011250636 HC RX REV CODE- 250/636: Performed by: INTERNAL MEDICINE

## 2021-07-18 PROCEDURE — 83735 ASSAY OF MAGNESIUM: CPT

## 2021-07-18 PROCEDURE — 93005 ELECTROCARDIOGRAM TRACING: CPT

## 2021-07-18 PROCEDURE — 36415 COLL VENOUS BLD VENIPUNCTURE: CPT

## 2021-07-18 PROCEDURE — 74011000250 HC RX REV CODE- 250: Performed by: INTERNAL MEDICINE

## 2021-07-18 PROCEDURE — 65270000029 HC RM PRIVATE

## 2021-07-18 PROCEDURE — 0202U NFCT DS 22 TRGT SARS-COV-2: CPT

## 2021-07-18 PROCEDURE — 71045 X-RAY EXAM CHEST 1 VIEW: CPT

## 2021-07-18 PROCEDURE — 99285 EMERGENCY DEPT VISIT HI MDM: CPT

## 2021-07-18 PROCEDURE — 0100U RESPIRATORY PANEL,PCR,NASOPHARYNGEAL: CPT

## 2021-07-18 PROCEDURE — 81001 URINALYSIS AUTO W/SCOPE: CPT

## 2021-07-18 PROCEDURE — 74011250636 HC RX REV CODE- 250/636: Performed by: EMERGENCY MEDICINE

## 2021-07-18 PROCEDURE — 84100 ASSAY OF PHOSPHORUS: CPT

## 2021-07-18 PROCEDURE — 94760 N-INVAS EAR/PLS OXIMETRY 1: CPT

## 2021-07-18 PROCEDURE — 80053 COMPREHEN METABOLIC PANEL: CPT

## 2021-07-18 PROCEDURE — 83605 ASSAY OF LACTIC ACID: CPT

## 2021-07-18 PROCEDURE — 77030029684 HC NEB SM VOL KT MONA -A

## 2021-07-18 PROCEDURE — 84484 ASSAY OF TROPONIN QUANT: CPT

## 2021-07-18 PROCEDURE — 74011000250 HC RX REV CODE- 250: Performed by: EMERGENCY MEDICINE

## 2021-07-18 PROCEDURE — 74011000258 HC RX REV CODE- 258: Performed by: EMERGENCY MEDICINE

## 2021-07-18 PROCEDURE — 74011250637 HC RX REV CODE- 250/637: Performed by: INTERNAL MEDICINE

## 2021-07-18 PROCEDURE — 96375 TX/PRO/DX INJ NEW DRUG ADDON: CPT

## 2021-07-18 PROCEDURE — 65660000000 HC RM CCU STEPDOWN

## 2021-07-18 PROCEDURE — 87636 SARSCOV2 & INF A&B AMP PRB: CPT

## 2021-07-18 RX ORDER — IPRATROPIUM BROMIDE AND ALBUTEROL SULFATE 2.5; .5 MG/3ML; MG/3ML
3 SOLUTION RESPIRATORY (INHALATION)
Status: DISCONTINUED | OUTPATIENT
Start: 2021-07-18 | End: 2021-07-22 | Stop reason: HOSPADM

## 2021-07-18 RX ORDER — ONDANSETRON 4 MG/1
4 TABLET, ORALLY DISINTEGRATING ORAL
Status: DISCONTINUED | OUTPATIENT
Start: 2021-07-18 | End: 2021-07-22 | Stop reason: HOSPADM

## 2021-07-18 RX ORDER — MONTELUKAST SODIUM 10 MG/1
10 TABLET ORAL
Status: DISCONTINUED | OUTPATIENT
Start: 2021-07-18 | End: 2021-07-22 | Stop reason: HOSPADM

## 2021-07-18 RX ORDER — LOSARTAN POTASSIUM 50 MG/1
100 TABLET ORAL DAILY
COMMUNITY

## 2021-07-18 RX ORDER — BUDESONIDE 0.5 MG/2ML
500 INHALANT ORAL
Status: DISCONTINUED | OUTPATIENT
Start: 2021-07-18 | End: 2021-07-21

## 2021-07-18 RX ORDER — ENOXAPARIN SODIUM 100 MG/ML
40 INJECTION SUBCUTANEOUS DAILY
Status: DISCONTINUED | OUTPATIENT
Start: 2021-07-19 | End: 2021-07-22 | Stop reason: HOSPADM

## 2021-07-18 RX ORDER — FLUTICASONE PROPIONATE 50 MCG
2 SPRAY, SUSPENSION (ML) NASAL DAILY
Status: DISCONTINUED | OUTPATIENT
Start: 2021-07-18 | End: 2021-07-22 | Stop reason: HOSPADM

## 2021-07-18 RX ORDER — LOSARTAN POTASSIUM 50 MG/1
50 TABLET ORAL DAILY
Status: DISCONTINUED | OUTPATIENT
Start: 2021-07-19 | End: 2021-07-21

## 2021-07-18 RX ORDER — GUAIFENESIN 600 MG/1
600 TABLET, EXTENDED RELEASE ORAL EVERY 12 HOURS
Status: DISCONTINUED | OUTPATIENT
Start: 2021-07-18 | End: 2021-07-22 | Stop reason: HOSPADM

## 2021-07-18 RX ORDER — IPRATROPIUM BROMIDE AND ALBUTEROL SULFATE 2.5; .5 MG/3ML; MG/3ML
3 SOLUTION RESPIRATORY (INHALATION) ONCE
Status: COMPLETED | OUTPATIENT
Start: 2021-07-18 | End: 2021-07-18

## 2021-07-18 RX ORDER — SODIUM CHLORIDE 0.9 % (FLUSH) 0.9 %
5-40 SYRINGE (ML) INJECTION EVERY 8 HOURS
Status: DISCONTINUED | OUTPATIENT
Start: 2021-07-18 | End: 2021-07-22 | Stop reason: HOSPADM

## 2021-07-18 RX ORDER — ONDANSETRON 2 MG/ML
8 INJECTION INTRAMUSCULAR; INTRAVENOUS
Status: COMPLETED | OUTPATIENT
Start: 2021-07-18 | End: 2021-07-18

## 2021-07-18 RX ORDER — SODIUM CHLORIDE 0.9 % (FLUSH) 0.9 %
5-40 SYRINGE (ML) INJECTION AS NEEDED
Status: DISCONTINUED | OUTPATIENT
Start: 2021-07-18 | End: 2021-07-22 | Stop reason: HOSPADM

## 2021-07-18 RX ORDER — SODIUM CHLORIDE 0.9 % (FLUSH) 0.9 %
5-10 SYRINGE (ML) INJECTION AS NEEDED
Status: DISCONTINUED | OUTPATIENT
Start: 2021-07-18 | End: 2021-07-18

## 2021-07-18 RX ORDER — HYDROCODONE POLISTIREX AND CHLORPHENIRAMINE POLISTIREX 10; 8 MG/5ML; MG/5ML
5 SUSPENSION, EXTENDED RELEASE ORAL
Status: DISCONTINUED | OUTPATIENT
Start: 2021-07-18 | End: 2021-07-22 | Stop reason: HOSPADM

## 2021-07-18 RX ORDER — ATORVASTATIN CALCIUM 10 MG/1
10 TABLET, FILM COATED ORAL DAILY
Status: DISCONTINUED | OUTPATIENT
Start: 2021-07-19 | End: 2021-07-22 | Stop reason: HOSPADM

## 2021-07-18 RX ORDER — ALBUTEROL SULFATE 0.83 MG/ML
2.5 SOLUTION RESPIRATORY (INHALATION)
Status: DISCONTINUED | OUTPATIENT
Start: 2021-07-18 | End: 2021-07-22 | Stop reason: HOSPADM

## 2021-07-18 RX ORDER — ACETAMINOPHEN 650 MG/1
650 SUPPOSITORY RECTAL
Status: DISCONTINUED | OUTPATIENT
Start: 2021-07-18 | End: 2021-07-22 | Stop reason: HOSPADM

## 2021-07-18 RX ORDER — ARFORMOTEROL TARTRATE 15 UG/2ML
15 SOLUTION RESPIRATORY (INHALATION)
Status: DISCONTINUED | OUTPATIENT
Start: 2021-07-18 | End: 2021-07-21

## 2021-07-18 RX ORDER — ACETAMINOPHEN 325 MG/1
650 TABLET ORAL
Status: DISCONTINUED | OUTPATIENT
Start: 2021-07-18 | End: 2021-07-22 | Stop reason: HOSPADM

## 2021-07-18 RX ORDER — ONDANSETRON 2 MG/ML
4 INJECTION INTRAMUSCULAR; INTRAVENOUS
Status: DISCONTINUED | OUTPATIENT
Start: 2021-07-18 | End: 2021-07-22 | Stop reason: HOSPADM

## 2021-07-18 RX ORDER — POLYETHYLENE GLYCOL 3350 17 G/17G
17 POWDER, FOR SOLUTION ORAL DAILY PRN
Status: DISCONTINUED | OUTPATIENT
Start: 2021-07-18 | End: 2021-07-22 | Stop reason: HOSPADM

## 2021-07-18 RX ORDER — PANTOPRAZOLE SODIUM 40 MG/1
40 TABLET, DELAYED RELEASE ORAL
Status: DISCONTINUED | OUTPATIENT
Start: 2021-07-19 | End: 2021-07-22 | Stop reason: HOSPADM

## 2021-07-18 RX ADMIN — ARFORMOTEROL TARTRATE 15 MCG: 15 SOLUTION RESPIRATORY (INHALATION) at 20:43

## 2021-07-18 RX ADMIN — SODIUM CHLORIDE 1500 ML: 9 INJECTION, SOLUTION INTRAVENOUS at 11:10

## 2021-07-18 RX ADMIN — BUDESONIDE 500 MCG: 0.5 INHALANT RESPIRATORY (INHALATION) at 20:43

## 2021-07-18 RX ADMIN — HYDROCODONE POLISTIREX AND CHLORPHENIRAMINE POLISTIREX 5 ML: 10; 8 SUSPENSION, EXTENDED RELEASE ORAL at 16:24

## 2021-07-18 RX ADMIN — ONDANSETRON 8 MG: 2 INJECTION INTRAMUSCULAR; INTRAVENOUS at 11:10

## 2021-07-18 RX ADMIN — GUAIFENESIN 600 MG: 600 TABLET, EXTENDED RELEASE ORAL at 21:20

## 2021-07-18 RX ADMIN — MONTELUKAST 10 MG: 10 TABLET, FILM COATED ORAL at 21:20

## 2021-07-18 RX ADMIN — AZITHROMYCIN MONOHYDRATE 500 MG: 500 INJECTION, POWDER, LYOPHILIZED, FOR SOLUTION INTRAVENOUS at 12:48

## 2021-07-18 RX ADMIN — IPRATROPIUM BROMIDE AND ALBUTEROL SULFATE 3 ML: .5; 3 SOLUTION RESPIRATORY (INHALATION) at 20:43

## 2021-07-18 RX ADMIN — IPRATROPIUM BROMIDE AND ALBUTEROL SULFATE 3 ML: .5; 3 SOLUTION RESPIRATORY (INHALATION) at 10:55

## 2021-07-18 RX ADMIN — FLUTICASONE PROPIONATE 2 SPRAY: 50 SPRAY, METERED NASAL at 17:27

## 2021-07-18 RX ADMIN — Medication 10 ML: at 16:24

## 2021-07-18 RX ADMIN — Medication 10 ML: at 21:19

## 2021-07-18 RX ADMIN — METHYLPREDNISOLONE SODIUM SUCCINATE 80 MG: 40 INJECTION, POWDER, FOR SOLUTION INTRAMUSCULAR; INTRAVENOUS at 21:19

## 2021-07-18 RX ADMIN — METHYLPREDNISOLONE SODIUM SUCCINATE 125 MG: 125 INJECTION, POWDER, FOR SOLUTION INTRAMUSCULAR; INTRAVENOUS at 11:12

## 2021-07-18 RX ADMIN — IPRATROPIUM BROMIDE AND ALBUTEROL SULFATE 3 ML: .5; 3 SOLUTION RESPIRATORY (INHALATION) at 16:10

## 2021-07-18 RX ADMIN — CEFTRIAXONE SODIUM 2 G: 2 INJECTION, POWDER, FOR SOLUTION INTRAMUSCULAR; INTRAVENOUS at 12:14

## 2021-07-18 NOTE — ED NOTES
TRANSFER - OUT REPORT:    Verbal report given to oJann Yang RN(name) on [facility]  being transferred to [facility](unit) for routine progression of care       Report consisted of patients Situation, Background, Assessment and   Recommendations(SBAR). Information from the following report(s) SBAR, Kardex, ED Summary, [facility] and Cardiac Rhythm sinus tach was reviewed with the receiving nurse. Lines:   Peripheral IV 07/18/21 Left;Posterior Wrist (Active)        Opportunity for questions and clarification was provided.       Patient transported with:   Monitor

## 2021-07-18 NOTE — ED NOTES
Pt pain assessed. Offered use of restroom and assistance as necessary. Pt declined. Pt offered repositioning in bed for comfort. Assessed pt's ability to access possessions, everything within reach of pt. Pt reminded to use call bell as necessary, report any changes in status. Pt thanked for allowing care. Bed locked in lowest position, side rails up as necessary. No nausea after the Zofran.

## 2021-07-18 NOTE — ED PROVIDER NOTES
EMERGENCY DEPARTMENT HISTORY AND PHYSICAL EXAM          Date: 7/18/2021  Patient Name: Audelia Sanabria    History of Presenting Illness     Chief Complaint   Patient presents with    Shortness of Breath       History Provided By: Patient    HPI: Audelia Sanabria is a 67 y.o. female, pmhx COPD, hypertension,, who presents ambulatory to the ED c/o headache and not feeling well    Patient notes she has been feeling well the past week. She thought she had a sinus infection because she had a lot of frontal pressure for the past 4 to 5 days so she has been taking Sudafed which did help her symptoms. This morning she states her headache was worse and she was feeling short of breath as she could not manage at home anymore so she wanted to come to the ER for evaluation. She denies any objective fever or chills but she has felt warm with nausea and vomiting. She did not use her nebulizer today because she just went back to bed after she got up and then when she woke up from her nap she decided she needed to come to the hospital.    Patient has been vaccinated against Covid    PCP: Sergio San MD    Allergies: Penicillin  Social Hx: -tobacco, -vaping, +EtOH, -Illicit Drugs; There are no other complaints, changes, or physical findings at this time.      Current Facility-Administered Medications   Medication Dose Route Frequency Provider Last Rate Last Admin    sodium chloride (NS) flush 5-10 mL  5-10 mL IntraVENous PRN TermeerNiranjan MD        cefTRIAXone (ROCEPHIN) 2 g in 0.9% sodium chloride (MBP/ADV) 50 mL MBP  2 g IntraVENous Q24H TermeerNiranjan  mL/hr at 07/18/21 1214 2 g at 07/18/21 1214    azithromycin (ZITHROMAX) 500 mg in 0.9% sodium chloride 250 mL (VIAL-MATE)  500 mg IntraVENous Q24H TermeerNiranjan MD         Current Outpatient Medications   Medication Sig Dispense Refill    albuterol (PROVENTIL HFA, VENTOLIN HFA, PROAIR HFA) 90 mcg/actuation inhaler Take 2 Puffs by inhalation every four (4) hours as needed.  dilTIAZem ER (CARDIZEM CD) 180 mg capsule       fluticasone-umeclidin-vilanter (Trelegy Ellipta) 200-62.5-25 mcg dsdv Take 1 Puff by inhalation daily.  esomeprazole (NEXIUM) 20 mg capsule Take 20 mg by mouth.  Daliresp 500 mcg tab tablet TAKE 1/2 TABLET BY MOUTH DAILY FOR 30 DAYS,THEN TAKE 1 TAB DAILY      albuterol-ipratropium (DUO-NEB) 2.5 mg-0.5 mg/3 ml nebu 3 mL by Nebulization route every four (4) hours as needed for Wheezing. 30 Nebule 1    azithromycin (Zithromax Z-Tavares) 250 mg tablet 2 tabs today and then 1 tab daily until completed 6 Tab 0    levoFLOXacin (LEVAQUIN) 750 mg tablet Take 1 Tab by mouth daily. Indications: pneumonia 5 Tab 0    sodium chloride 3 % nebulizer solution TAKE 4 ML BY NEBULIZATION DAILY      potassium chloride SR (K-TAB) 20 mEq tablet TAKE 1 TABLET BY MOUTH ONCE DAILY FOR LOW POTASSIUM      Tobradex ophthalmic ointment APPLY TO EACH EYE AT BEDTIME AS NEEDED      Oxygen 1 Each by Nasal route daily as needed. Uses one liter of oxygen as needed      amLODIPine (NORVASC) 5 mg tablet Indications: High Blood Pressure Disorder 1 pill daily for blood pressure      valACYclovir (VALTREX) 500 mg tablet 1 pill twice daily for 5 days if needed for outbreak      montelukast (SINGULAIR) 10 mg tablet Take 10 mg by mouth At bedtime.  atorvastatin (LIPITOR) 10 mg tablet Take 10 mg by mouth daily.  multivitamin (MULTIPLE VITAMINS PO) Take  by mouth.  loratadine (CLARITIN) 10 mg tablet Take 1 Tab by mouth daily. Indications: ALLERGIC RHINITIS 30 Tab 4    fluticasone (FLONASE) 50 mcg/actuation nasal spray One spray per nostril twice a day  Indications: ALLERGIC RHINITIS 1 Bottle 5    naproxen sodium (ALEVE) 220 mg cap Take  by mouth.          Past History     Past Medical History:  Past Medical History:   Diagnosis Date    Chronic obstructive pulmonary disease ([facility] [address]     Environmental and seasonal allergies     GERD (gastroesophageal reflux disease)     Herpes     Genital    Hx of colonoscopy 2017    Hypertension     OA (osteoarthritis)        Past Surgical History:  Past Surgical History:   Procedure Laterality Date    HX BREAST AUGMENTATION      HX CATARACT REMOVAL Bilateral     HX COLONOSCOPY  2005    [facility]    HX COLONOSCOPY  2017    nl    HX ORTHOPAEDIC Left 10/14/2016    left foot planter fibroma    HX ORTHOPAEDIC      right hand fibroma    HX TUBAL LIGATION      IMPLANT BREAST SILICONE/EQ         Family History:  Family History   Problem Relation Age of Onset    Heart Disease Father     Hypertension Mother     Liver Disease Brother         Hepatitis C    Heart Failure Brother        Social History:  Social History     Tobacco Use    Smoking status: Former Smoker     Quit date: 1995     Years since quittin.1    Smokeless tobacco: Never Used   Substance Use Topics    Alcohol use: Yes     Alcohol/week: 63.0 standard drinks     Types: 21 Glasses of wine, 42 Shots of liquor per week     Comment: 2 drinks per night    Drug use: No       Allergies: Allergies   Allergen Reactions    Pcn [Penicillins] Unknown (comments)         Review of Systems   Review of Systems   Constitutional: Negative for activity change, appetite change, chills, fever and unexpected weight change. HENT: Negative for congestion. Eyes: Negative for pain and visual disturbance. Respiratory: Negative for cough and shortness of breath. Cardiovascular: Negative for chest pain. Gastrointestinal: Negative for abdominal pain, diarrhea, nausea and vomiting. Genitourinary: Negative for dysuria. Musculoskeletal: Negative for back pain. Skin: Negative for rash. Neurological: Negative for headaches. Physical Exam   Physical Exam  Vitals and nursing note reviewed. Constitutional:       Appearance: She is well-developed. She is not diaphoretic.       Comments: Anxious elderly female currently appearing in moderate distress with tachycardia and tachypnea   HENT:      Head: Normocephalic and atraumatic. Eyes:      General:         Right eye: No discharge. Left eye: No discharge. Conjunctiva/sclera: Conjunctivae normal.      Pupils: Pupils are equal, round, and reactive to light. Cardiovascular:      Rate and Rhythm: Regular rhythm. Tachycardia present. Heart sounds: Normal heart sounds. No murmur heard. Pulmonary:      Effort: Tachypnea and accessory muscle usage present. No respiratory distress. Breath sounds: Decreased breath sounds present. No wheezing or rales. Abdominal:      General: Bowel sounds are normal. There is no distension. Palpations: Abdomen is soft. Tenderness: There is no abdominal tenderness. Musculoskeletal:         General: Normal range of motion. Cervical back: Normal range of motion and neck supple. Skin:     General: Skin is warm and dry. Coloration: Skin is not pale. Findings: No erythema or rash. Neurological:      General: No focal deficit present. Mental Status: She is alert and oriented to person, place, and time. Cranial Nerves: No cranial nerve deficit. Motor: No abnormal muscle tone.        Diagnostic Study Results     Labs -     Recent Results (from the past 12 hour(s))   EKG, 12 LEAD, INITIAL    Collection Time: 07/18/21 10:37 AM   Result Value Ref Range    Ventricular Rate 123 BPM    Atrial Rate 123 BPM    P-R Interval 136 ms    QRS Duration 80 ms    Q-T Interval 316 ms    QTC Calculation (Bezet) 452 ms    Calculated P Axis 27 degrees    Calculated R Axis -16 degrees    Calculated T Axis 17 degrees    Diagnosis       Sinus tachycardia  Otherwise normal ECG  When compared with ECG of 06-DEC-2020 13:19,  No significant change was found     LACTIC ACID    Collection Time: 07/18/21 10:56 AM   Result Value Ref Range    Lactic acid 0.8 0.4 - 2.0 MMOL/L   METABOLIC PANEL, COMPREHENSIVE    Collection Time: 07/18/21 10:56 AM   Result Value Ref Range    Sodium 134 (L) 136 - 145 mmol/L    Potassium 4.0 3.5 - 5.1 mmol/L    Chloride 98 97 - 108 mmol/L    CO2 20 (L) 21 - 32 mmol/L    Anion gap 16 (H) 5 - 15 mmol/L    Glucose 102 (H) 65 - 100 mg/dL    BUN 11 6 - 20 MG/DL    Creatinine 0.99 0.55 - 1.02 MG/DL    BUN/Creatinine ratio 11 (L) 12 - 20      GFR est AA >60 >60 ml/min/1.73m2    GFR est non-AA 54 (L) >60 ml/min/1.73m2    Calcium 8.8 8.5 - 10.1 MG/DL    Bilirubin, total 1.5 (H) 0.2 - 1.0 MG/DL    ALT (SGPT) 46 12 - 78 U/L    AST (SGOT) 35 15 - 37 U/L    Alk. phosphatase 105 45 - 117 U/L    Protein, total 7.1 6.4 - 8.2 g/dL    Albumin 2.8 (L) 3.5 - 5.0 g/dL    Globulin 4.3 (H) 2.0 - 4.0 g/dL    A-G Ratio 0.7 (L) 1.1 - 2.2     CBC WITH AUTOMATED DIFF    Collection Time: 07/18/21 10:56 AM   Result Value Ref Range    WBC 13.3 (H) 3.6 - 11.0 K/uL    RBC 3.62 (L) 3.80 - 5.20 M/uL    HGB 12.2 11.5 - 16.0 g/dL    HCT 36.1 35.0 - 47.0 %    MCV 99.7 (H) 80.0 - 99.0 FL    MCH 33.7 26.0 - 34.0 PG    MCHC 33.8 30.0 - 36.5 g/dL    RDW 12.7 11.5 - 14.5 %    PLATELET 328 677 - 583 K/uL    MPV 9.8 8.9 - 12.9 FL    NRBC 0.0 0  WBC    ABSOLUTE NRBC 0.00 0.00 - 0.01 K/uL    NEUTROPHILS 81 (H) 32 - 75 %    LYMPHOCYTES 7 (L) 12 - 49 %    MONOCYTES 10 5 - 13 %    EOSINOPHILS 2 0 - 7 %    BASOPHILS 0 0 - 1 %    IMMATURE GRANULOCYTES 0 0.0 - 0.5 %    ABS. NEUTROPHILS 10.8 (H) 1.8 - 8.0 K/UL    ABS. LYMPHOCYTES 0.9 0.8 - 3.5 K/UL    ABS. MONOCYTES 1.3 (H) 0.0 - 1.0 K/UL    ABS. EOSINOPHILS 0.3 0.0 - 0.4 K/UL    ABS. BASOPHILS 0.1 0.0 - 0.1 K/UL    ABS. IMM.  GRANS. 0.1 (H) 0.00 - 0.04 K/UL    DF AUTOMATED     TROPONIN I    Collection Time: 07/18/21 10:56 AM   Result Value Ref Range    Troponin-I, Qt. <0.05 <0.05 ng/mL   MAGNESIUM    Collection Time: 07/18/21 10:56 AM   Result Value Ref Range    Magnesium 1.9 1.6 - 2.4 mg/dL   PHOSPHORUS    Collection Time: 07/18/21 10:56 AM   Result Value Ref Range    Phosphorus 2.4 (L) 2.6 - 4.7 MG/DL   COVID-19 WITH INFLUENZA A/B    Collection Time: 07/18/21 11:01 AM   Result Value Ref Range    SARS-CoV-2 Not detected NOTD      Influenza A by PCR Not detected NOTD      Influenza B by PCR Not detected NOTD         Radiologic Studies -   XR CHEST PORT   Final Result   New bilateral patchy upper lobe infiltrates are nonspecific but may   represent viral pneumonia; radiographic follow-up is recommended to assure   complete resolution. CT Results  (Last 48 hours)    None        CXR Results  (Last 48 hours)               07/18/21 1111  XR CHEST PORT Final result    Impression:  New bilateral patchy upper lobe infiltrates are nonspecific but may   represent viral pneumonia; radiographic follow-up is recommended to assure   complete resolution. Narrative:  INDICATION: Shortness of breath. COMPARISON: July 5, 2021       FINDINGS: AP portable imaging of the chest performed at 11:01 AM demonstrates a   stable cardiomediastinal silhouette. . There are new bilateral patchy upper lobe   infiltrates. The lungs are otherwise clear. No pleural effusion is evident. There are bilateral calcified breast implants. Medical Decision Making   I am the first provider for this patient. I reviewed the vital signs, available nursing notes, past medical history, past surgical history, family history and social history. Vital Signs-Reviewed the patient's vital signs.   Patient Vitals for the past 12 hrs:   Temp Pulse Resp BP SpO2   07/18/21 1200 -- 99 27 131/67 96 %   07/18/21 1145 -- (!) 102 21 (!) 140/65 95 %   07/18/21 1130 -- (!) 106 24 138/67 95 %   07/18/21 1124 -- (!) 108 20 -- 94 %   07/18/21 1115 -- (!) 113 22 (!) 147/72 95 %   07/18/21 1109 -- (!) 111 18 125/68 97 %   07/18/21 1033 98.2 °F (36.8 °C) (!) 132 22 (!) 152/80 96 %       Pulse Oximetry Analysis - 96% on RA    Cardiac Monitor:   Rate: 132bpm  Rhythm: Sinus Tachycardia      Records Reviewed: Nursing Notes, Old Medical Records, Previous electrocardiograms, Previous Radiology Studies and Previous Laboratory Studies    Provider Notes (Medical Decision Making):   MDM: Elderly female with a history of COPD presenting with tachycardia, tachypnea and shortness of breath. She is wheezing but really with decreased air movement. She is hemodynamically stable with low concern for septic shock. Sepsis order set initiated but will hold antibiotics at this time as she is afebrile. ED Course:   Initial assessment performed. The patients presenting problems have been discussed, and they are in agreement with the care plan formulated and outlined with them. I have encouraged them to ask questions as they arise throughout their visit. EKG interpretation: (Preliminary)  Rhythm: Sinus tachycardia at a rate of 123 bpm; normal NV; normal QRS; normal QTC with left axis deviation. There is slight delay in R wave progression otherwise appears unchanged from December 2020  This EKG was interpreted by ED Provider Love Shah MD      PROGRESS NOTE:    ED Course as of Jul 18 1220   Sun Jul 18, 2021   1124 Patient reevaluated and appears much improved clinically. She is breathing easily and her heart rate is down to 104. When asked how she feels after the treatment she states she does not feel any better. [JT]   1200 Discussed results with patient and her spouse. Given her bilateral upper extremity pneumonia with her tachycardia explained that the treatment options are antibiotics either inpatient or at home. Patient states she feels too poorly to go home. Will discuss with hospitalist for admission. [JT]      ED Course User Index  [JT] Nasrin Tolliver MD        CONSULT NOTE:   12:10 PM  Love Shah MD spoke with Dr. Taryn Fisher,   Specialty: Hospitalist  Discussed pt's hx, disposition, and available diagnostic and imaging results. Reviewed care plans. Consultant agrees with plans as outlined.   He will evaluate patient for admission. Critical Care Time:   0      Diagnosis     Clinical Impression:   1. Pneumonia of both upper lobes due to infectious organism    2. Leukocytosis, unspecified type        PLAN:  Admission for further management and care      Please note, this dictation was completed with [employer], the computer voice recognition software. Quite often unanticipated grammatical, syntax, homophones, and other interpretive errors are inadvertently transcribed by the computer software. Please disregard these errors. Please excuse any errors that have escaped final proof reading.

## 2021-07-18 NOTE — ED TRIAGE NOTES
Sick x1 week thinks she has a sinus infection and using antihistamines. . Has the Worst headache of her life. H? A 4-5 days. Has nausea and vomiting and fever. More SOB than usual and has been using her nebulizer. Shanita Colder

## 2021-07-18 NOTE — H&P
[facility]   Admission History & Physical        7/18/2021 3:18 PM  Patient: Rondall Nissen 1945  PCP: Amado Kimbrough MD    HISTORY  Chief Complaint:   Chief Complaint   Patient presents with    Shortness of Breath       HPI: 67 y.o. female presenting for admission to [facility] for further evaluation and treatment for Bilateral pneumonia. She  has a past medical history of Chronic obstructive pulmonary disease ([address] Environmental and seasonal allergies, GERD (gastroesophageal reflux disease), Herpes, colonoscopy (05/22/2017), Hypertension, and OA (osteoarthritis). She also has no past medical history of Endocrine disorder. . She presents to the ED with complaints of extreme weakness and fatigue associated with cough and shortness of breath was which has markedly progressed over the past couple days but has been developing over the past month. The patient was seen in the ED on July 5 and was diagnosed with COPD exacerbation and was discharged on her routine bronchodilators oxygen and a new prescription for prednisone 60 mg daily for 4 days. She noted some clinical improvement but since has had gradual deterioration. She has congestion and cough but no mucus production. She denies complaints with fever or chill. On presentation her white count is elevated slightly. She has had chronic illness which has not responded well to medications and has transferred to several different pulmonologist over the past couple years. She has recently undergone a cardiac work-up in [place] and was not prescribed any adjusted medications. [facility] Nancy Wilkinson. They are following a nodule RUL, which may need a biopsy. She has had O2 by nasal cannula available for several years following a hospitalization but does not use it routinely.   He has not used her DuoNeb for several days feeling that it aggravated palpitation and nausea. He has been markedly nauseous for several days and has not been eating well. She attempts to take her medications with food to avoid adverse GI effects. She denies any diarrhea or vomiting. She has documented reflux and chronically takes Nexium. Nuclear Stress Test 6/11  · The EKG stress is negative for ischemia. · LV perfusion is probably abnormal with no evidence of inducible ischemia. · There is a small size, mild severity, fixed defect in the apical region. Which may represent a small prior infarct. · Mildly decreased ejection fraction at rest 52% which increases to 60% on stress. No focal wall motion abnormality. ECHO 5/18/21:  · Technically difficult study with limited acoustic windows  · Normal left ventricular size and wall thickness, poor endocardial   definition although the apical views suggest that there is mild to   moderate impairment of left ventricular systolic function with an ejection   fraction estimated to be 45%  · Sclerotic but not stenotic aortic valve with probably moderate aortic   regurgitation, pressure half-time 319 millisecond  · Mildly sclerotic mitral valve with mild mitral regurgitation  · Increased left atrial volume  · Normal right heart size with pulmonary artery systolic pressure   estimated to be 30 mm of mercury  · Limited diastolic assessment was possible  · There are no old studies available for comparison    NM PET/CT 3/10/21  Nodular region in the lateral aspect of the right lung apex demonstrates low level metabolic activity with an SUV of approximately 1.5.  This could reflect some inflammatory change.  Etiology such as bronchoalveolar cell carcinoma can also demonstrate low level metabolic activity.  Severe emphysematous changes within the lungs.  Uptake in the esophagus suggesting esophagitis or reflux.  No evidence of metastatic disease. CT CHEST 1/12/21  1.  Presence of a focal, ill-defined, triangular shaped soft tissue lesion in the  lateral aspect of the right upper lobe as described above. 2. Continued evidence of chronic fibrotic scarring in the right upper lobe. Evidence of some bullous change in this region. 3. Interval resolution of the previously described groundglass densities in the  mid/lower portion of both lungs. Past Medical History:  Past Medical History:   Diagnosis Date    Chronic obstructive pulmonary disease ([address]     Environmental and seasonal allergies     GERD (gastroesophageal reflux disease)     Herpes     Genital    Hx of colonoscopy 05/22/2017    Hypertension     OA (osteoarthritis)        Past Surgical History:  Past Surgical History:   Procedure Laterality Date    HX BREAST AUGMENTATION      HX CATARACT REMOVAL Bilateral     HX COLONOSCOPY  2005    [facility]    HX COLONOSCOPY  05/22/2017    nl    HX ORTHOPAEDIC Left 10/14/2016    left foot planter fibroma    HX ORTHOPAEDIC      right hand fibroma    HX TUBAL LIGATION      IMPLANT BREAST SILICONE/EQ         Medication:  Prior to Admission medications    Medication Sig Start Date End Date Taking? Authorizing Provider   losartan (COZAAR) 50 mg tablet Take 50 mg by mouth daily. 2 tabs per day   Yes Other, MD Yessenia   albuterol (PROVENTIL HFA, VENTOLIN HFA, PROAIR HFA) 90 mcg/actuation inhaler Take 2 Puffs by inhalation every four (4) hours as needed. 8/27/20  Yes Other, MD Yessenia   fluticasone-umeclidin-vilanter (Trelegy Ellipta) 200-62.5-25 mcg dsdv Take 1 Puff by inhalation daily. 2/19/21 2/19/22 Yes Other, MD Yessenia   esomeprazole (NEXIUM) 20 mg capsule Take 20 mg by mouth. Yes Other, MD Yessenia   albuterol-ipratropium (DUO-NEB) 2.5 mg-0.5 mg/3 ml nebu 3 mL by Nebulization route every four (4) hours as needed for Wheezing.  4/26/21 5/28/22 Yes TermeerNona MD   sodium chloride 3 % nebulizer solution TAKE 4 ML BY NEBULIZATION DAILY 11/20/20  Yes Other, MD Yessenia   Tobradex ophthalmic ointment APPLY TO EACH EYE AT BEDTIME AS NEEDED 11/3/20  Yes Other, MD Yessenia   Oxygen 1 Each by Nasal route daily as needed. Uses one liter of oxygen as needed   Yes Other, MD Yessenia   valACYclovir (VALTREX) 500 mg tablet 1 pill twice daily for 5 days if needed for outbreak 2/7/20  Yes Other, MD Yessenia   montelukast (SINGULAIR) 10 mg tablet Take 10 mg by mouth At bedtime. 5/18/20  Yes Jim, MD Yessenia   atorvastatin (LIPITOR) 10 mg tablet Take 10 mg by mouth daily. 5/18/20  Yes Other, MD Yessenia   multivitamin (MULTIPLE VITAMINS PO) Take  by mouth. Yes Other, MD Yessenia   fluticasone (FLONASE) 50 mcg/actuation nasal spray One spray per nostril twice a day  Indications: ALLERGIC RHINITIS 3/7/17  Yes Bianca Felder NP   dilTIAZem ER (CARDIZEM CD) 180 mg capsule  4/19/21   Other, MD Yessenia   Daliresp 500 mcg tab tablet TAKE 1/2 TABLET BY MOUTH DAILY FOR 30 DAYS,THEN TAKE 1 TAB DAILY  Patient not taking: Reported on 7/18/2021 2/19/21   Other, MD Yessenia   azithromycin (Zithromax Z-Tavares) 250 mg tablet 2 tabs today and then 1 tab daily until completed  Patient not taking: Reported on 7/18/2021 4/26/21   Lucius Fernandez MD   levoFLOXacin (LEVAQUIN) 750 mg tablet Take 1 Tab by mouth daily. Indications: pneumonia  Patient not taking: Reported on 7/18/2021 12/8/20   Gretchen Penaloza MD   potassium chloride SR (K-TAB) 20 mEq tablet TAKE 1 TABLET BY MOUTH ONCE DAILY FOR LOW POTASSIUM 10/26/20   OtherYessenia MD   amLODIPine (NORVASC) 5 mg tablet Indications: High Blood Pressure Disorder 1 pill daily for blood pressure  Patient not taking: Reported on 7/18/2021 7/2/20   Other, MD Yessenia   loratadine (CLARITIN) 10 mg tablet Take 1 Tab by mouth daily. Indications: ALLERGIC RHINITIS  Patient not taking: Reported on 7/18/2021 3/7/17   Bianca Felder NP   naproxen sodium (ALEVE) 220 mg cap Take  by mouth. Provider, Historical       Allergies:   Allergies   Allergen Reactions    Daliresp [Roflumilast] Diarrhea    Levofloxacin Diarrhea     Diarrhea and stomach cramps    Maxzide [Triamterene-Hydrochlorothiazid] Rash    Pcn [Penicillins] Unknown (comments)       Social History:  Social History     Tobacco Use    Smoking status: Former Smoker     Quit date: 1995     Years since quittin.1    Smokeless tobacco: Never Used   Substance Use Topics    Alcohol use:  Yes     Alcohol/week: 63.0 standard drinks     Types: 21 Glasses of wine, 42 Shots of liquor per week     Comment: 2 drinks per night    Drug use: No       Family History:  Family History   Problem Relation Age of Onset    Heart Disease Father     Hypertension Mother     Liver Disease Brother         Hepatitis C    Heart Failure Brother        ROS:  Total of 12 systems reviewed as follows:  POSITIVE= bolded text  Negative = text not bolded       General:  fever, chills, sweats, generalized weakness, weight loss/gain, loss of appetite   Eyes:    blurred vision, eye pain, loss of vision, double vision  ENT:    rhinorrhea, pharyngitis   Respiratory:  cough, sputum production, SOB, [place], wheezing, pleuritic pain   Cardiology:   chest pain, palpitations, orthopnea, PND, edema, syncope   Gastrointestinal:  abdominal pain , N/V, diarrhea, dysphagia, constipation, bleeding   Genitourinary:  frequency, urgency, dysuria, hematuria, incontinence, prostatism   Muskuloskeletal: arthralgia, myalgia, back pain  Hematology:   easy bruising, nose or gum bleeding, lymphadenopathy   Dermatological: rash, ulceration, pruritis, color change / jaundice  Endocrine:   hot flashes or polydipsia   Neurological:  headache, dizziness, confusion, focal weakness, paresthesia, speech difficulties, memory loss, gait difficulty  Psychological: feelings of anxiety, depression, agitation      PHYSICAL EXAM:  Patient Vitals for the past 24 hrs:   Temp Pulse Resp BP SpO2   21 1320 98.2 °F (36.8 °C) (!) 107 22 (!) 148/70 97 %   21 1245 -- (!) 101 21 124/68 94 %   21 1230 -- (!) 103 21 126/70 95 %   21 1215 -- (!) 104 25 113/71 95 % 07/18/21 1200 -- 99 27 131/67 96 %   07/18/21 1145 -- (!) 102 21 (!) 140/65 95 %   07/18/21 1130 -- (!) 106 24 138/67 95 %   07/18/21 1124 -- (!) 108 20 -- 94 %   07/18/21 1115 -- (!) 113 22 (!) 147/72 95 %   07/18/21 1109 -- (!) 111 18 125/68 97 %   07/18/21 1033 98.2 °F (36.8 °C) (!) 132 22 (!) 152/80 96 %       General:    Alert, cooperative, mild distress, appears stated age. HEENT: Atraumatic, anicteric sclerae, pink conjunctivae     No oral ulcers, mucosa moist, throat clear, dentition fair  Neck:  Supple, symmetrical;   thyroid non tender  Lungs:   Fair excursion. Mild B rhonchi, no wheezing. Recurrent nonprod cough  Chest wall:  No tenderness. No accessory muscle use. Heart:   Regular rhythm.  + murmur. Tr edema  Abdomen:   Soft, non-tender. Not distended. Bowel sounds normal  Extremities: No cyanosis. No clubbing      Capillary refill normal,  Radial pulse 2+,  DP 1+  Skin:     Not pale. Not jaundiced. No rashes   Psych:  Depressed. Not anxious or agitated. Neurologic: EOMs intact. No facial asymmetry. No aphasia or slurred speech. Symmetrical strength, Sensation grossly intact. Alert and oriented X 4.     Lab Data Reviewed:    Recent Results (from the past 24 hour(s))   EKG, 12 LEAD, INITIAL    Collection Time: 07/18/21 10:37 AM   Result Value Ref Range    Ventricular Rate 123 BPM    Atrial Rate 123 BPM    P-R Interval 136 ms    QRS Duration 80 ms    Q-T Interval 316 ms    QTC Calculation (Bezet) 452 ms    Calculated P Axis 27 degrees    Calculated R Axis -16 degrees    Calculated T Axis 17 degrees    Diagnosis       Sinus tachycardia  Otherwise normal ECG  When compared with ECG of 06-DEC-2020 13:19,  No significant change was found     LACTIC ACID    Collection Time: 07/18/21 10:56 AM   Result Value Ref Range    Lactic acid 0.8 0.4 - 2.0 MMOL/L   METABOLIC PANEL, COMPREHENSIVE    Collection Time: 07/18/21 10:56 AM   Result Value Ref Range    Sodium 134 (L) 136 - 145 mmol/L    Potassium 4.0 3.5 - 5.1 mmol/L    Chloride 98 97 - 108 mmol/L    CO2 20 (L) 21 - 32 mmol/L    Anion gap 16 (H) 5 - 15 mmol/L    Glucose 102 (H) 65 - 100 mg/dL    BUN 11 6 - 20 MG/DL    Creatinine 0.99 0.55 - 1.02 MG/DL    BUN/Creatinine ratio 11 (L) 12 - 20      GFR est AA >60 >60 ml/min/1.73m2    GFR est non-AA 54 (L) >60 ml/min/1.73m2    Calcium 8.8 8.5 - 10.1 MG/DL    Bilirubin, total 1.5 (H) 0.2 - 1.0 MG/DL    ALT (SGPT) 46 12 - 78 U/L    AST (SGOT) 35 15 - 37 U/L    Alk. phosphatase 105 45 - 117 U/L    Protein, total 7.1 6.4 - 8.2 g/dL    Albumin 2.8 (L) 3.5 - 5.0 g/dL    Globulin 4.3 (H) 2.0 - 4.0 g/dL    A-G Ratio 0.7 (L) 1.1 - 2.2     CBC WITH AUTOMATED DIFF    Collection Time: 07/18/21 10:56 AM   Result Value Ref Range    WBC 13.3 (H) 3.6 - 11.0 K/uL    RBC 3.62 (L) 3.80 - 5.20 M/uL    HGB 12.2 11.5 - 16.0 g/dL    HCT 36.1 35.0 - 47.0 %    MCV 99.7 (H) 80.0 - 99.0 FL    MCH 33.7 26.0 - 34.0 PG    MCHC 33.8 30.0 - 36.5 g/dL    RDW 12.7 11.5 - 14.5 %    PLATELET 922 945 - 489 K/uL    MPV 9.8 8.9 - 12.9 FL    NRBC 0.0 0  WBC    ABSOLUTE NRBC 0.00 0.00 - 0.01 K/uL    NEUTROPHILS 81 (H) 32 - 75 %    LYMPHOCYTES 7 (L) 12 - 49 %    MONOCYTES 10 5 - 13 %    EOSINOPHILS 2 0 - 7 %    BASOPHILS 0 0 - 1 %    IMMATURE GRANULOCYTES 0 0.0 - 0.5 %    ABS. NEUTROPHILS 10.8 (H) 1.8 - 8.0 K/UL    ABS. LYMPHOCYTES 0.9 0.8 - 3.5 K/UL    ABS. MONOCYTES 1.3 (H) 0.0 - 1.0 K/UL    ABS. EOSINOPHILS 0.3 0.0 - 0.4 K/UL    ABS. BASOPHILS 0.1 0.0 - 0.1 K/UL    ABS. IMM.  GRANS. 0.1 (H) 0.00 - 0.04 K/UL    DF AUTOMATED     TROPONIN I    Collection Time: 07/18/21 10:56 AM   Result Value Ref Range    Troponin-I, Qt. <0.05 <0.05 ng/mL   MAGNESIUM    Collection Time: 07/18/21 10:56 AM   Result Value Ref Range    Magnesium 1.9 1.6 - 2.4 mg/dL   PHOSPHORUS    Collection Time: 07/18/21 10:56 AM   Result Value Ref Range    Phosphorus 2.4 (L) 2.6 - 4.7 MG/DL   COVID-19 WITH INFLUENZA A/B    Collection Time: 07/18/21 11:01 AM   Result Value Ref Range SARS-CoV-2 Not detected NOTD      Influenza A by PCR Not detected NOTD      Influenza B by PCR Not detected NOTD     URINALYSIS W/ REFLEX CULTURE    Collection Time: 07/18/21  1:45 PM    Specimen: Urine   Result Value Ref Range    Color YELLOW/STRAW      Appearance CLEAR CLEAR      Specific gravity 1.010 1.003 - 1.030      pH (UA) 7.0 5.0 - 8.0      Protein Negative NEG mg/dL    Glucose Negative NEG mg/dL    Ketone TRACE (A) NEG mg/dL    Bilirubin Negative NEG      Blood Negative NEG      Urobilinogen 1.0 0.2 - 1.0 EU/dL    Nitrites Negative NEG      Leukocyte Esterase SMALL (A) NEG      WBC 0-4 0 - 4 /hpf    RBC 0-5 0 - 5 /hpf    Epithelial cells FEW FEW /lpf    Bacteria Negative NEG /hpf    UA:UC IF INDICATED CULTURE NOT INDICATED BY UA RESULT CNI         EKG:   bpm, [address]     Radiology:  XR CHEST PORT   Final Result   New bilateral patchy upper lobe infiltrates are nonspecific but may   represent viral pneumonia; radiographic follow-up is recommended to assure   complete resolution. Care Plan discussed with:   Patient x    Family     RN x         Consultant      Expected  Disposition:   Home with Family x   HH/PT/OT/RN    SNF/LTC    [facility]IL[facility]ERT      TOTAL TIME:  61 Minutes      Comments    x Reviewed previous records   >50% of visit spent in counseling and coordination of care x Discussion with patient and/or family and questions answered       _______________________________________________________  Given the patient's current clinical presentation, I have a high level of concern for decompensation if discharged from the emergency department. Complex decision making was performed, which includes reviewing the patient's available past medical records, laboratory results, and x-ray films. My assessment of this patient's clinical condition and my plan of care is as follows.     ASSESSMENT / PLAN    Principal Problem:    Bilateral pneumonia (7/18/2021)  The bilateral upper lobe infiltrates new compared to previous x-ray July 5  Associated generalized constitutional symptoms with malaise, myalgia, nausea  Presenting with lingering and worsening complaints of cough, congestion, shortness of breath, weakness  Has a long-term history of COPD that has not responded well to medications  She feels she is weaker and sicker at this time that she has in the past  Chest x-ray and constitutional complaints seem most consistent with a viral illness  Beginning empiric antibiotic treatment for CAP-azithromycin and Rocephin  Initiate steroids with Solu-Medrol 80 mg every 8   bid Pulmicort / Sharlie Faster bid  Maintained DuoNeb 4 times daily respiratory schedule  Supplement albuterol neb every 4 hours as needed  Maintain oxygen 2 L nasal cannula as needed  Add Mucinex 600 mg twice daily and Tussionex 1 teaspoon twice daily as needed  Viral respiratory panel  Will confer with patient's current pulmonologist in [place] tomorrow    Active Problems:    COPD exacerbation ([address] (5/29/2020)  Suspect related to the infiltrates noted on x-ray  Adjust medications as noted above      Essential hypertension (12/6/2020)  Patient apparently has been switched from Diltiazem to Cozaar-need to clarify with home bottles  Diltiazem would have been more effective for controlling her rapid heart rate complaints      GERD (gastroesophageal reflux disease) ()  Maintain PPI with house preferred Protonix      Herpes ()  Patient has used Valtrex as needed acute symptoms, none current        SAFETY:   Code Status:Full  DVT prophylaxis:Lovenox  Stress Ulcer prophylaxis: Protonix (home med Nexium)  Bladder catheter:no  Family Contact Info:  Primary Emergency Contact: Marques Crespo, [place] Phone: [phone]  Bedded: [facility] Room 116/01  Disposition: TBD, likely home when stable  Admission status:  Inpatient    -Tentative plan of care discussed with patient / family, who demonstrated understanding and is in agreement to the above  -Case was reviewed with the ED Provider, Elizabeth Moore, [address], MD  [facility] Hospitalist  [phone]

## 2021-07-18 NOTE — ED NOTES
Knocked on door to announce to pt. Hands washed. Introduced self to pt and updated whiteboard. Explained role of self to pt. ED flow explained to pt. Pt verbalized understanding.  updated on pt condition.

## 2021-07-19 LAB
ANION GAP SERPL CALC-SCNC: 14 MMOL/L (ref 5–15)
B PERT DNA SPEC QL NAA+PROBE: NOT DETECTED
BASOPHILS # BLD: 0 K/UL (ref 0–0.1)
BASOPHILS NFR BLD: 0 % (ref 0–1)
BORDETELLA PARAPERTUSSIS PCR, BORPAR: NOT DETECTED
BUN SERPL-MCNC: 12 MG/DL (ref 6–20)
BUN/CREAT SERPL: 13 (ref 12–20)
C PNEUM DNA SPEC QL NAA+PROBE: NOT DETECTED
CALCIUM SERPL-MCNC: 8.5 MG/DL (ref 8.5–10.1)
CHLORIDE SERPL-SCNC: 104 MMOL/L (ref 97–108)
CO2 SERPL-SCNC: 21 MMOL/L (ref 21–32)
COMMENT, HOLDF: NORMAL
CREAT SERPL-MCNC: 0.94 MG/DL (ref 0.55–1.02)
CRP SERPL-MCNC: 13.3 MG/DL (ref 0–0.6)
DIFFERENTIAL METHOD BLD: ABNORMAL
EOSINOPHIL # BLD: 0 K/UL (ref 0–0.4)
EOSINOPHIL NFR BLD: 0 % (ref 0–7)
ERYTHROCYTE [DISTWIDTH] IN BLOOD BY AUTOMATED COUNT: 12.4 % (ref 11.5–14.5)
ERYTHROCYTE [SEDIMENTATION RATE] IN BLOOD: 109 MM/HR (ref 0–30)
FLUAV H1 2009 PAND RNA SPEC QL NAA+PROBE: NOT DETECTED
FLUAV H1 RNA SPEC QL NAA+PROBE: NOT DETECTED
FLUAV H3 RNA SPEC QL NAA+PROBE: NOT DETECTED
FLUAV SUBTYP SPEC NAA+PROBE: NOT DETECTED
FLUBV RNA SPEC QL NAA+PROBE: NOT DETECTED
GLUCOSE SERPL-MCNC: 146 MG/DL (ref 65–100)
HADV DNA SPEC QL NAA+PROBE: NOT DETECTED
HCOV 229E RNA SPEC QL NAA+PROBE: NOT DETECTED
HCOV HKU1 RNA SPEC QL NAA+PROBE: NOT DETECTED
HCOV NL63 RNA SPEC QL NAA+PROBE: NOT DETECTED
HCOV OC43 RNA SPEC QL NAA+PROBE: NOT DETECTED
HCT VFR BLD AUTO: 31.9 % (ref 35–47)
HGB BLD-MCNC: 10.7 G/DL (ref 11.5–16)
HMPV RNA SPEC QL NAA+PROBE: NOT DETECTED
HPIV1 RNA SPEC QL NAA+PROBE: NOT DETECTED
HPIV2 RNA SPEC QL NAA+PROBE: NOT DETECTED
HPIV3 RNA SPEC QL NAA+PROBE: NOT DETECTED
HPIV4 RNA SPEC QL NAA+PROBE: NOT DETECTED
IMM GRANULOCYTES # BLD AUTO: 0.1 K/UL (ref 0–0.04)
IMM GRANULOCYTES NFR BLD AUTO: 1 % (ref 0–0.5)
LYMPHOCYTES # BLD: 0.5 K/UL (ref 0.8–3.5)
LYMPHOCYTES NFR BLD: 6 % (ref 12–49)
M PNEUMO DNA SPEC QL NAA+PROBE: NOT DETECTED
MAGNESIUM SERPL-MCNC: 2 MG/DL (ref 1.6–2.4)
MCH RBC QN AUTO: 33.5 PG (ref 26–34)
MCHC RBC AUTO-ENTMCNC: 33.5 G/DL (ref 30–36.5)
MCV RBC AUTO: 100 FL (ref 80–99)
MONOCYTES # BLD: 0.2 K/UL (ref 0–1)
MONOCYTES NFR BLD: 3 % (ref 5–13)
NEUTS SEG # BLD: 7 K/UL (ref 1.8–8)
NEUTS SEG NFR BLD: 90 % (ref 32–75)
NRBC # BLD: 0 K/UL (ref 0–0.01)
NRBC BLD-RTO: 0 PER 100 WBC
PLATELET # BLD AUTO: 176 K/UL (ref 150–400)
PMV BLD AUTO: 10.1 FL (ref 8.9–12.9)
POTASSIUM SERPL-SCNC: 4.3 MMOL/L (ref 3.5–5.1)
PROCALCITONIN SERPL-MCNC: 32.66 NG/ML
RBC # BLD AUTO: 3.19 M/UL (ref 3.8–5.2)
RBC MORPH BLD: ABNORMAL
RSV RNA SPEC QL NAA+PROBE: NOT DETECTED
RV+EV RNA SPEC QL NAA+PROBE: NOT DETECTED
SAMPLES BEING HELD,HOLD: NORMAL
SARS-COV-2 PCR, COVPCR: NOT DETECTED
SODIUM SERPL-SCNC: 139 MMOL/L (ref 136–145)
WBC # BLD AUTO: 7.8 K/UL (ref 3.6–11)

## 2021-07-19 PROCEDURE — 94761 N-INVAS EAR/PLS OXIMETRY MLT: CPT

## 2021-07-19 PROCEDURE — 94640 AIRWAY INHALATION TREATMENT: CPT

## 2021-07-19 PROCEDURE — 74011000250 HC RX REV CODE- 250: Performed by: INTERNAL MEDICINE

## 2021-07-19 PROCEDURE — 85025 COMPLETE CBC W/AUTO DIFF WBC: CPT

## 2021-07-19 PROCEDURE — 84145 PROCALCITONIN (PCT): CPT

## 2021-07-19 PROCEDURE — 74011250637 HC RX REV CODE- 250/637: Performed by: INTERNAL MEDICINE

## 2021-07-19 PROCEDURE — 86140 C-REACTIVE PROTEIN: CPT

## 2021-07-19 PROCEDURE — 74011250636 HC RX REV CODE- 250/636: Performed by: INTERNAL MEDICINE

## 2021-07-19 PROCEDURE — 94760 N-INVAS EAR/PLS OXIMETRY 1: CPT

## 2021-07-19 PROCEDURE — 77010033678 HC OXYGEN DAILY

## 2021-07-19 PROCEDURE — 36415 COLL VENOUS BLD VENIPUNCTURE: CPT

## 2021-07-19 PROCEDURE — 85652 RBC SED RATE AUTOMATED: CPT

## 2021-07-19 PROCEDURE — 74011000258 HC RX REV CODE- 258: Performed by: INTERNAL MEDICINE

## 2021-07-19 PROCEDURE — 65660000000 HC RM CCU STEPDOWN

## 2021-07-19 PROCEDURE — 65270000029 HC RM PRIVATE

## 2021-07-19 PROCEDURE — 80048 BASIC METABOLIC PNL TOTAL CA: CPT

## 2021-07-19 PROCEDURE — 83735 ASSAY OF MAGNESIUM: CPT

## 2021-07-19 RX ORDER — GUAIFENESIN 600 MG/1
600 TABLET, EXTENDED RELEASE ORAL
COMMUNITY
End: 2022-05-27

## 2021-07-19 RX ADMIN — Medication 10 ML: at 22:00

## 2021-07-19 RX ADMIN — AZITHROMYCIN MONOHYDRATE 500 MG: 500 INJECTION, POWDER, LYOPHILIZED, FOR SOLUTION INTRAVENOUS at 12:17

## 2021-07-19 RX ADMIN — METHYLPREDNISOLONE SODIUM SUCCINATE 80 MG: 40 INJECTION, POWDER, FOR SOLUTION INTRAMUSCULAR; INTRAVENOUS at 21:06

## 2021-07-19 RX ADMIN — METHYLPREDNISOLONE SODIUM SUCCINATE 80 MG: 40 INJECTION, POWDER, FOR SOLUTION INTRAMUSCULAR; INTRAVENOUS at 14:12

## 2021-07-19 RX ADMIN — PANTOPRAZOLE SODIUM 40 MG: 40 TABLET, DELAYED RELEASE ORAL at 06:34

## 2021-07-19 RX ADMIN — IPRATROPIUM BROMIDE AND ALBUTEROL SULFATE 3 ML: .5; 3 SOLUTION RESPIRATORY (INHALATION) at 08:29

## 2021-07-19 RX ADMIN — ARFORMOTEROL TARTRATE 15 MCG: 15 SOLUTION RESPIRATORY (INHALATION) at 20:33

## 2021-07-19 RX ADMIN — CEFTRIAXONE SODIUM 2 G: 2 INJECTION, POWDER, FOR SOLUTION INTRAMUSCULAR; INTRAVENOUS at 11:45

## 2021-07-19 RX ADMIN — MONTELUKAST 10 MG: 10 TABLET, FILM COATED ORAL at 21:06

## 2021-07-19 RX ADMIN — FLUTICASONE PROPIONATE 2 SPRAY: 50 SPRAY, METERED NASAL at 09:26

## 2021-07-19 RX ADMIN — LOSARTAN POTASSIUM 50 MG: 50 TABLET, FILM COATED ORAL at 09:25

## 2021-07-19 RX ADMIN — IPRATROPIUM BROMIDE AND ALBUTEROL SULFATE 3 ML: .5; 3 SOLUTION RESPIRATORY (INHALATION) at 12:49

## 2021-07-19 RX ADMIN — GUAIFENESIN 600 MG: 600 TABLET, EXTENDED RELEASE ORAL at 09:25

## 2021-07-19 RX ADMIN — METHYLPREDNISOLONE SODIUM SUCCINATE 80 MG: 40 INJECTION, POWDER, FOR SOLUTION INTRAMUSCULAR; INTRAVENOUS at 05:01

## 2021-07-19 RX ADMIN — ARFORMOTEROL TARTRATE 15 MCG: 15 SOLUTION RESPIRATORY (INHALATION) at 08:29

## 2021-07-19 RX ADMIN — IPRATROPIUM BROMIDE AND ALBUTEROL SULFATE 3 ML: .5; 3 SOLUTION RESPIRATORY (INHALATION) at 16:29

## 2021-07-19 RX ADMIN — IPRATROPIUM BROMIDE AND ALBUTEROL SULFATE 3 ML: .5; 3 SOLUTION RESPIRATORY (INHALATION) at 20:33

## 2021-07-19 RX ADMIN — HYDROCODONE POLISTIREX AND CHLORPHENIRAMINE POLISTIREX 5 ML: 10; 8 SUSPENSION, EXTENDED RELEASE ORAL at 20:26

## 2021-07-19 RX ADMIN — ATORVASTATIN CALCIUM 10 MG: 10 TABLET, FILM COATED ORAL at 09:25

## 2021-07-19 RX ADMIN — HYDROCODONE POLISTIREX AND CHLORPHENIRAMINE POLISTIREX 5 ML: 10; 8 SUSPENSION, EXTENDED RELEASE ORAL at 05:00

## 2021-07-19 RX ADMIN — BUDESONIDE 500 MCG: 0.5 INHALANT RESPIRATORY (INHALATION) at 20:33

## 2021-07-19 RX ADMIN — BUDESONIDE 500 MCG: 0.5 INHALANT RESPIRATORY (INHALATION) at 08:29

## 2021-07-19 RX ADMIN — GUAIFENESIN 600 MG: 600 TABLET, EXTENDED RELEASE ORAL at 20:16

## 2021-07-19 NOTE — PROGRESS NOTES
[facility]  Hospitalist Progress Note    NAME: Cherie Thrasher   :  1945   MRN:  [id number]     Total duration of encounter: 1 day      Interim Hospital Summary: 67 y.o. female who presented on 2021 with Bilateral pneumonia. She has a past medical history of Chronic obstructive pulmonary disease ([address] Environmental and seasonal allergies, GERD (gastroesophageal reflux disease), Herpes, colonoscopy (2017), Hypertension, and OA (osteoarthritis). She also has no past medical history of Endocrine disorder. Pt present with 2 week h/o worsening c/o cough, weakness, poor eating, vomiting. Seen in ED  and Rx Pred boost w/o benefit. F/u CXR on this Adm noted for B infiltrates. Covid and Inf testing negative. Tx CAP with Rocephin / Azthromycin with Solumedrol and Bronchodilators and Oxygen          Subjective:     Chief Complaint / Reason for Physician Visit  \"no better\".   Discussed with RN   Hoarse  Nonproductive cough  Some delirium a/w Tussionex  Requested cough drop - obtained from home    Review of Systems:  Symptom Y/N Comments  Symptom Y/N Comments   Fever/Chills n   Chest Pain n    Poor Appetite y   Edema n    Cough y   Abdominal Pain n    Sputum n   Joint Pain n    SOB/JOHNSTON y   Pruritis/Rash n    Nausea/vomit y   Tolerating PT/OT     Diarrhea n   Tolerating Diet y    Constipation n   Other         Current Facility-Administered Medications:     cefTRIAXone (ROCEPHIN) 2 g in 0.9% sodium chloride (MBP/ADV) 50 mL MBP, 2 g, IntraVENous, Q24H, Silke Moore MD, IV Completed at 21 1217    azithromycin (ZITHROMAX) 500 mg in 0.9% sodium chloride 250 mL (VIAL-MATE), 500 mg, IntraVENous, Q24H, Silke Moore MD, IV Completed at 21 1327    albuterol-ipratropium (DUO-NEB) 2.5 MG-0.5 MG/3 ML, 3 mL, Nebulization, QID RT, Silke Moore MD, 3 mL at 21 1622    atorvastatin (LIPITOR) tablet 10 mg, 10 mg, Oral, DAILY, Silke Moore MD, 10 mg at 21 2420   pantoprazole (PROTONIX) tablet 40 mg, 40 mg, Oral, ACB, Lenora Bowen MD, 40 mg at 07/19/21 0634    fluticasone propionate (FLONASE) 50 mcg/actuation nasal spray 2 Spray, 2 Spray, Both Nostrils, DAILY, Lenora Bowen MD, 2 [place] at 07/19/21 0926    losartan (COZAAR) tablet 50 mg, 50 mg, Oral, DAILY, Lenora Bowen MD, 50 mg at 07/19/21 0925    montelukast (SINGULAIR) tablet 10 mg, 10 mg, Oral, QHS, Lenora Bowen MD, 10 mg at 07/18/21 2120    sodium chloride (NS) flush 5-40 mL, 5-40 mL, IntraVENous, Q8H, Lenora Bowen MD, 10 mL at 07/18/21 2119    sodium chloride (NS) flush 5-40 mL, 5-40 mL, IntraVENous, PRN, Lenora Bowen MD    acetaminophen (TYLENOL) tablet 650 mg, 650 mg, Oral, Q6H PRN **OR** acetaminophen (TYLENOL) suppository 650 mg, 650 mg, Rectal, Q6H PRN, Lenora Bowen MD    polyethylene glycol (MIRALAX) packet 17 g, 17 g, Oral, DAILY PRN, Lenora Bowen MD    ondansetron (ZOFRAN ODT) tablet 4 mg, 4 mg, Oral, Q8H PRN **OR** ondansetron (ZOFRAN) injection 4 mg, 4 mg, IntraVENous, Q6H PRN, Lenora Bowen MD    enoxaparin (LOVENOX) injection 40 mg, 40 mg, SubCUTAneous, DAILY, Lenora Bowen MD    methylPREDNISolone (PF) (SOLU-MEDROL) injection 80 mg, 80 mg, IntraVENous, Q8H, Lenora Bowen MD, 80 mg at 07/19/21 1412    arformoteroL (BROVANA) neb solution 15 mcg, 15 mcg, Nebulization, BID RT, Lenora Bowen MD, 15 mcg at 07/19/21 0829    budesonide (PULMICORT) 500 mcg/2 ml nebulizer suspension, 500 mcg, Nebulization, BID RT, Lenora Bowen MD, 500 mcg at 07/19/21 0829    albuterol (PROVENTIL VENTOLIN) nebulizer solution 2.5 mg, 2.5 mg, Nebulization, Q4H PRN, Lenora Bowen MD    HYDROcodone-chlorpheniramine (TUSSIONEX) oral suspension 5 mL, 5 mL, Oral, Q12H PRN, Lenora Bowen MD, 5 mL at 07/19/21 0500    guaiFENesin ER (MUCINEX) tablet 600 mg, 600 mg, Oral, Q12H, Lenora Bowen MD, 600 mg at 07/19/21 6054    Objective:     VITALS:   Patient Vitals for the past 12 hrs:   Temp Pulse Resp BP SpO2   07/19/21 1630 -- -- -- -- 100 %   07/19/21 1608 97.7 °F (36.5 °C) 95 20 126/63 94 %   07/19/21 1249 -- -- -- -- 100 %   07/19/21 1145 97.6 °F (36.4 °C) 97 20 (!) 142/76 95 %   07/19/21 0829 -- -- -- -- 95 %   07/19/21 0736 97.4 °F (36.3 °C) 90 20 (!) 168/82 96 %       Intake/Output Summary (Last 24 hours) at 7/19/2021 1838  Last data filed at 7/19/2021 1018  Gross per 24 hour   Intake 570 ml   Output --   Net 570 ml        PHYSICAL EXAM:  General: WD, WN. Alert, cooperative, no acute distress    EENT:  EOMI. Anicteric sclerae. MMM  Resp:  Scant B rhonchi, nonprod cough, no wheezing or rales. No accessory muscle use  CV:  Regular  rhythm,  No edema  GI:  Soft, Non distended, Non tender. +Bowel sounds  Neurologic:  Alert and oriented X 3, normal speech, nonfocal  Psych: Moderately anxious and agitated  Skin:  No rashes. No jaundice    LABS:  I reviewed today's most current labs and imaging studies. Pertinent labs include:  Recent Labs     07/19/21  0525 07/18/21  1056   WBC 7.8 13.3*   HGB 10.7* 12.2   HCT 31.9* 36.1    177     Recent Labs     07/19/21  0525 07/18/21  1056    134*   K 4.3 4.0    98   CO2 21 20*   * 102*   BUN 12 11   CREA 0.94 0.99   CA 8.5 8.8   MG 2.0 1.9   PHOS  --  2.4*   ALB  --  2.8*   TBILI  --  1.5*   ALT  --  46     CULTURES:  BC paired 7/18:  NG x 24hrs  Viral Resp panel:  Pending      EKG:   bpm, NSTWC      Radiology:  XR CHEST PORT   Final Result   New bilateral patchy upper lobe infiltrates are nonspecific but may   represent viral pneumonia; radiographic follow-up is recommended to assure   complete resolution. Procedures: see electronic medical records for all procedures/Xrays and details which were not copied into this note but were reviewed prior to creation of Plan.         Assessment / Plan:    Principal Problem:    Bilateral pneumonia (7/18/2021)  The bilateral upper lobe infiltrates new compared to previous x-ray July 5  Associated generalized constitutional symptoms with malaise, myalgia, nausea  Presenting with lingering and worsening complaints of cough, congestion, shortness of breath, weakness  Has a long-term history of COPD that has not responded well to medications  She feels she is weaker and sicker at this time that she has in the past  Chest x-ray and constitutional complaints seem most consistent with a viral illness  Beginning empiric antibiotic treatment for CAP-Azithromycin and Rocephin  Initiate steroids with Solu-Medrol 80 mg every 8  Hold Trelegy and tx Pulmicort / Elyssa Macleod bid  Maintained DuoNeb 4 times daily respiratory schedule  Supplement albuterol neb every 4 hours as needed  Maintain oxygen 2 L nasal cannula as needed  Add Mucinex 600 mg twice daily and Tussionex 1 teaspoon twice daily as needed  Viral respiratory panel still pending  Will confer with patient's current pulmonologist in [employer] tomorrow Milton Thibodeaux)     Active Problems:    COPD exacerbation ([address] (5/29/2020)  Suspect related to the infiltrates noted on x-ray  Adjust medications as noted above       Essential hypertension (12/6/2020)  Patient apparently has been switched from Diltiazem to Cozaar-need to clarify with home bottles  Diltiazem would have been more effective for controlling her rapid heart rate complaints       GERD (gastroesophageal reflux disease) ()  Maintain PPI with house preferred Protonix (home Nexium)       Herpes ()  Patient has used Valtrex as needed acute symptoms, none current or every day         SAFETY:   Code Status:Full  DVT prophylaxis:Lovenox  Stress Ulcer prophylaxis: Protonix (home med Nexium)  Bladder catheter:no  Family Contact Info:  Primary Emergency Contact: Marques Crespo, Home Phone: [phone]  Bedded: [facility] Room 116/01  Disposition: TBD, likely home when stable  Admission status:  Inpatient       Reviewed most current lab test results and cultures  YES  Reviewed most current radiology test results YES  Review and summation of old records today    NO  Reviewed patient's current orders and MAR    YES  PMH/SH reviewed - no change compared to H&P    Care Plan discussed with:                                   Comments  Patient x     Family  x    RN x     Care Manager  x     Consultant   x Dr. Annella Kayser [phone]                       Multidiciplinary team rounds were held today with , nursing, pharmacist and clinical coordinator. Patient's plan of care was discussed; medications were reviewed and discharge planning was addressed.         ____________________________________________    Total NON Critical Care TIME:  40   Minutes        Comments   >50% of visit spent in counseling and coordination of care   x      Signed: Yair Munoz MD  [facility] Hospitalist  [phone]

## 2021-07-19 NOTE — ROUTINE PROCESS
Bedside and Verbal shift change report given to NATI Murdock (oncoming nurse) by Preston White RN (offgoing nurse). Report included the following information SBAR and MAR.

## 2021-07-19 NOTE — RT PROTOCOL NOTE
RT Nebulizer Bronchodilator Protocol Note    There is a bronchodilator order in the chart from a provider indicating to follow the RT Bronchodilator Protocol and there is an Initiate RT Bronchodilator Protocol order as well (see protocol at bottom of note). The findings from the last RT Protocol Assessment were as follows:  Smoking:    Surgical Status:    Xray:    Respiratory Pattern:    Mental Status:    Breath Sounds:    Cough: Activity Level:    Oxygen Requirement:    Indication for Bronchodilator Therapy:    Bronchodilator Assessment Score:      Aerosolized bronchodilator medication orders have been revised according to the RT Bronchodilator Protocol. RT Bronchodilator Protocol:    Respiratory Therapist to perform RT Therapy Protocol Assessment then follow the protocol. No Indications - adjust the frequency to every 6 hours PRN wheezing or bronchospasm, if no treatments needed after 48 hours then discontinue using Per Protocol order mode. If indication present, adjust the RT bronchodilator orders based on the Bronchodilator Assessment Score as follows:    0-6 - enter or revise RT Bronchodilator order to Albuterol Nebulizer order with frequency of every 2 hours PRN for wheezing or increased work of breathing using Per Protocol order mode. Repeat RT Therapy Protocol Assessment as needed. 7-10 - discontinue any other Inpatient aerosolized bronchodilator medication orders and enter or revise two Albuterol Nebulizer orders, one with BID frequency and one with frequency of every 2 hours PRN wheezing or increased work of breathing using Per Protocol order mode. Repeat RT Therapy Protocol Assessment with second treatment then BID and as needed.       11-13 - discontinue any other Inpatient aerosolized bronchodilator medication orders and enter DuoNeb Nebulizer order with QID frequency and an Albuterol Nebulizer order with frequency of every 2 hours PRN wheezing or increased work of breathing using Per Protocol order mode. Repeat RT Therapy Protocol Assessment with second treatment then QID and as needed. Greater than 13 - discontinue any other Inpatient aerosolized bronchodilator medication orders and enter a DuoNeb Nebulizer order with every 4 hours frequency and an Albuterol Nebulizer order with frequency of every 2 hours PRN wheezing or increased work of breathing using Per Protocol order mode. Repeat RT Therapy Protocol Assessment with second treatment then every 4 hours and as needed. RT to enter RT Home Evaluation for COPD & MDI Assessment order using Per Protocol order mode.     Electronically signed by RT Nathen on 7/19/2021 at 8:34 AM

## 2021-07-19 NOTE — PROGRESS NOTES
Problem: Falls - Risk of  Goal: *Absence of Falls  Description: Document Jaleesa Roy Fall Risk and appropriate interventions in the flowsheet.   Outcome: Progressing Towards Goal  Note: Fall Risk Interventions:            Medication Interventions: Patient to call before getting OOB, Teach patient to arise slowly    Elimination Interventions: Bed/chair exit alarm, Call light in reach              Problem: Patient Education: Go to Patient Education Activity  Goal: Patient/Family Education  Outcome: Progressing Towards Goal     Problem: Patient Education: Go to Patient Education Activity  Goal: Patient/Family Education  Outcome: Progressing Towards Goal     Problem: Pneumonia: Day 1  Goal: Off Pathway (Use only if patient is Off Pathway)  Outcome: Progressing Towards Goal  Goal: Activity/Safety  Outcome: Progressing Towards Goal  Goal: Consults, if ordered  Outcome: Progressing Towards Goal  Goal: Diagnostic Test/Procedures  Outcome: Progressing Towards Goal  Goal: Nutrition/Diet  Outcome: Progressing Towards Goal  Goal: Medications  Outcome: Progressing Towards Goal  Goal: Respiratory  Outcome: Progressing Towards Goal  Goal: Treatments/Interventions/Procedures  Outcome: Progressing Towards Goal  Goal: Psychosocial  Outcome: Progressing Towards Goal  Goal: *Oxygen saturation within defined limits  Outcome: Progressing Towards Goal  Goal: *Influenza vaccine administered (October-March)  Outcome: Progressing Towards Goal  Goal: *Pneumoccocal vaccine administered  Outcome: Progressing Towards Goal  Goal: *Hemodynamically stable  Outcome: Progressing Towards Goal  Goal: *Demonstrates progressive activity  Outcome: Progressing Towards Goal  Goal: *Tolerating diet  Outcome: Progressing Towards Goal     Problem: Pneumonia: Day 2  Goal: Off Pathway (Use only if patient is Off Pathway)  Outcome: Progressing Towards Goal  Goal: Activity/Safety  Outcome: Progressing Towards Goal  Goal: Consults, if ordered  Outcome: Progressing Towards Goal  Goal: Diagnostic Test/Procedures  Outcome: Progressing Towards Goal  Goal: Nutrition/Diet  Outcome: Progressing Towards Goal  Goal: Discharge Planning  Outcome: Progressing Towards Goal  Goal: Medications  Outcome: Progressing Towards Goal  Goal: Respiratory  Outcome: Progressing Towards Goal  Goal: Treatments/Interventions/Procedures  Outcome: Progressing Towards Goal  Goal: Psychosocial  Outcome: Progressing Towards Goal  Goal: *Oxygen saturation within defined limits  Outcome: Progressing Towards Goal  Goal: *Hemodynamically stable  Outcome: Progressing Towards Goal  Goal: *Demonstrates progressive activity  Outcome: Progressing Towards Goal  Goal: *Tolerating diet  Outcome: Progressing Towards Goal  Goal: *Optimal pain control at patient's stated goal  Outcome: Progressing Towards Goal     Problem: Pneumonia: Day 3  Goal: Off Pathway (Use only if patient is Off Pathway)  Outcome: Progressing Towards Goal  Goal: Activity/Safety  Outcome: Progressing Towards Goal  Goal: Consults, if ordered  Outcome: Progressing Towards Goal  Goal: Diagnostic Test/Procedures  Outcome: Progressing Towards Goal  Goal: Nutrition/Diet  Outcome: Progressing Towards Goal  Goal: Discharge Planning  Outcome: Progressing Towards Goal  Goal: Medications  Outcome: Progressing Towards Goal  Goal: Respiratory  Outcome: Progressing Towards Goal  Goal: Treatments/Interventions/Procedures  Outcome: Progressing Towards Goal  Goal: Psychosocial  Outcome: Progressing Towards Goal  Goal: *Oxygen saturation within defined limits  Outcome: Progressing Towards Goal  Goal: *Hemodynamically stable  Outcome: Progressing Towards Goal  Goal: *Demonstrates progressive activity  Outcome: Progressing Towards Goal  Goal: *Tolerating diet  Outcome: Progressing Towards Goal  Goal: *Describes available resources and support systems  Outcome: Progressing Towards Goal  Goal: *Optimal pain control at patient's stated goal  Outcome: Progressing Towards Goal     Problem: Pneumonia: Day 4  Goal: Off Pathway (Use only if patient is Off Pathway)  Outcome: Progressing Towards Goal  Goal: Activity/Safety  Outcome: Progressing Towards Goal  Goal: Nutrition/Diet  Outcome: Progressing Towards Goal  Goal: Discharge Planning  Outcome: Progressing Towards Goal  Goal: Medications  Outcome: Progressing Towards Goal  Goal: Respiratory  Outcome: Progressing Towards Goal  Goal: Psychosocial  Outcome: Progressing Towards Goal     Problem: Pneumonia: Discharge Outcomes  Goal: *Demonstrates progressive activity  Outcome: Progressing Towards Goal  Goal: *Describes follow-up/return visits to physicians  Outcome: Progressing Towards Goal  Goal: *Tolerating diet  Outcome: Progressing Towards Goal  Goal: *Verbalizes name, dosage, time, side effects, and number of days to continue medications  Outcome: Progressing Towards Goal  Goal: *Influenza immunization  Outcome: Resolved/Met  Goal: *Respiratory status at baseline  Outcome: Progressing Towards Goal  Goal: *Vital signs within defined limits  Outcome: Progressing Towards Goal  Goal: *Describes available resources and support systems  Outcome: Progressing Towards Goal  Goal: *Optimal pain control at patient's stated goal  Outcome: Progressing Towards Goal

## 2021-07-19 NOTE — PROGRESS NOTES
Problem: Falls - Risk of  Goal: *Absence of Falls  Description: Document Romulus Fall Risk and appropriate interventions in the flowsheet.   Outcome: Progressing Towards Goal  Note: Fall Risk Interventions:                 Elimination Interventions: Call light in reach              Problem: Patient Education: Go to Patient Education Activity  Goal: Patient/Family Education  Outcome: Progressing Towards Goal     Problem: Pneumonia: Day 1  Goal: Activity/Safety  Outcome: Progressing Towards Goal  Goal: Consults, if ordered  Outcome: Progressing Towards Goal  Goal: Nutrition/Diet  Outcome: Progressing Towards Goal  Goal: Medications  Outcome: Progressing Towards Goal  Goal: Respiratory  Outcome: Progressing Towards Goal  Goal: Treatments/Interventions/Procedures  Outcome: Progressing Towards Goal  Goal: Psychosocial  Outcome: Progressing Towards Goal  Goal: *Oxygen saturation within defined limits  Outcome: Progressing Towards Goal

## 2021-07-19 NOTE — PROGRESS NOTES
Bedside shift change report given to CARLEY Starr RN (oncoming nurse) by Johnathan Kowalski RN (offgoing nurse). Report included the following information Kardex.

## 2021-07-20 ENCOUNTER — APPOINTMENT (OUTPATIENT)
Dept: GENERAL RADIOLOGY | Age: 76
DRG: 194 | End: 2021-07-20
Attending: INTERNAL MEDICINE
Payer: MEDICARE

## 2021-07-20 LAB
ATRIAL RATE: 123 BPM
CALCULATED P AXIS, ECG09: 27 DEGREES
CALCULATED R AXIS, ECG10: -16 DEGREES
CALCULATED T AXIS, ECG11: 17 DEGREES
DIAGNOSIS, 93000: NORMAL
P-R INTERVAL, ECG05: 136 MS
Q-T INTERVAL, ECG07: 316 MS
QRS DURATION, ECG06: 80 MS
QTC CALCULATION (BEZET), ECG08: 452 MS
VENTRICULAR RATE, ECG03: 123 BPM

## 2021-07-20 PROCEDURE — 94760 N-INVAS EAR/PLS OXIMETRY 1: CPT

## 2021-07-20 PROCEDURE — 74011250636 HC RX REV CODE- 250/636: Performed by: INTERNAL MEDICINE

## 2021-07-20 PROCEDURE — 74011000250 HC RX REV CODE- 250: Performed by: INTERNAL MEDICINE

## 2021-07-20 PROCEDURE — 77010033678 HC OXYGEN DAILY

## 2021-07-20 PROCEDURE — 94640 AIRWAY INHALATION TREATMENT: CPT

## 2021-07-20 PROCEDURE — 74011250637 HC RX REV CODE- 250/637: Performed by: INTERNAL MEDICINE

## 2021-07-20 PROCEDURE — 74011000258 HC RX REV CODE- 258: Performed by: INTERNAL MEDICINE

## 2021-07-20 PROCEDURE — 94761 N-INVAS EAR/PLS OXIMETRY MLT: CPT

## 2021-07-20 PROCEDURE — 77030021668 HC NEB PREFIL KT VYRM -A

## 2021-07-20 PROCEDURE — 71046 X-RAY EXAM CHEST 2 VIEWS: CPT

## 2021-07-20 PROCEDURE — 65660000000 HC RM CCU STEPDOWN

## 2021-07-20 RX ADMIN — BUDESONIDE 500 MCG: 0.5 INHALANT RESPIRATORY (INHALATION) at 20:01

## 2021-07-20 RX ADMIN — METHYLPREDNISOLONE SODIUM SUCCINATE 80 MG: 40 INJECTION, POWDER, FOR SOLUTION INTRAMUSCULAR; INTRAVENOUS at 22:04

## 2021-07-20 RX ADMIN — HYDROCODONE POLISTIREX AND CHLORPHENIRAMINE POLISTIREX 5 ML: 10; 8 SUSPENSION, EXTENDED RELEASE ORAL at 20:44

## 2021-07-20 RX ADMIN — ARFORMOTEROL TARTRATE 15 MCG: 15 SOLUTION RESPIRATORY (INHALATION) at 08:58

## 2021-07-20 RX ADMIN — ARFORMOTEROL TARTRATE 15 MCG: 15 SOLUTION RESPIRATORY (INHALATION) at 20:01

## 2021-07-20 RX ADMIN — Medication 10 ML: at 05:37

## 2021-07-20 RX ADMIN — AZITHROMYCIN MONOHYDRATE 500 MG: 500 INJECTION, POWDER, LYOPHILIZED, FOR SOLUTION INTRAVENOUS at 15:25

## 2021-07-20 RX ADMIN — METHYLPREDNISOLONE SODIUM SUCCINATE 80 MG: 40 INJECTION, POWDER, FOR SOLUTION INTRAMUSCULAR; INTRAVENOUS at 14:18

## 2021-07-20 RX ADMIN — FLUTICASONE PROPIONATE 2 SPRAY: 50 SPRAY, METERED NASAL at 09:53

## 2021-07-20 RX ADMIN — MONTELUKAST 10 MG: 10 TABLET, FILM COATED ORAL at 22:03

## 2021-07-20 RX ADMIN — LOSARTAN POTASSIUM 50 MG: 50 TABLET, FILM COATED ORAL at 09:49

## 2021-07-20 RX ADMIN — POLYETHYLENE GLYCOL 3350 17 G: 17 POWDER, FOR SOLUTION ORAL at 10:04

## 2021-07-20 RX ADMIN — BUDESONIDE 500 MCG: 0.5 INHALANT RESPIRATORY (INHALATION) at 08:58

## 2021-07-20 RX ADMIN — METHYLPREDNISOLONE SODIUM SUCCINATE 80 MG: 40 INJECTION, POWDER, FOR SOLUTION INTRAMUSCULAR; INTRAVENOUS at 05:37

## 2021-07-20 RX ADMIN — GUAIFENESIN 600 MG: 600 TABLET, EXTENDED RELEASE ORAL at 09:48

## 2021-07-20 RX ADMIN — Medication 10 ML: at 22:02

## 2021-07-20 RX ADMIN — Medication 10 ML: at 14:22

## 2021-07-20 RX ADMIN — PANTOPRAZOLE SODIUM 40 MG: 40 TABLET, DELAYED RELEASE ORAL at 05:43

## 2021-07-20 RX ADMIN — IPRATROPIUM BROMIDE AND ALBUTEROL SULFATE 3 ML: .5; 3 SOLUTION RESPIRATORY (INHALATION) at 08:58

## 2021-07-20 RX ADMIN — IPRATROPIUM BROMIDE AND ALBUTEROL SULFATE 3 ML: .5; 3 SOLUTION RESPIRATORY (INHALATION) at 16:29

## 2021-07-20 RX ADMIN — IPRATROPIUM BROMIDE AND ALBUTEROL SULFATE 3 ML: .5; 3 SOLUTION RESPIRATORY (INHALATION) at 20:01

## 2021-07-20 RX ADMIN — IPRATROPIUM BROMIDE AND ALBUTEROL SULFATE 3 ML: .5; 3 SOLUTION RESPIRATORY (INHALATION) at 12:47

## 2021-07-20 RX ADMIN — CEFTRIAXONE SODIUM 2 G: 2 INJECTION, POWDER, FOR SOLUTION INTRAMUSCULAR; INTRAVENOUS at 13:56

## 2021-07-20 RX ADMIN — GUAIFENESIN 600 MG: 600 TABLET, EXTENDED RELEASE ORAL at 20:46

## 2021-07-20 NOTE — PROGRESS NOTES
Problem: Falls - Risk of  Goal: *Absence of Falls  Description: Document Miky Emmanuel Fall Risk and appropriate interventions in the flowsheet.   7/20/2021 0038 by Maynor Wood RN  Outcome: Progressing Towards Goal  Note: Fall Risk Interventions:            Medication Interventions: Patient to call before getting OOB    Elimination Interventions: Call light in reach           7/20/2021 0038 by Maynor Wodo RN  Outcome: Progressing Towards Goal  Note: Fall Risk Interventions:            Medication Interventions: Patient to call before getting OOB    Elimination Interventions: Call light in reach

## 2021-07-20 NOTE — PROGRESS NOTES
Care Management Interventions  PCP Verified by CM: Yes Zena Rose MD)  Last Visit to PCP: 06/21/21  Palliative Care Criteria Met (RRAT>21 & CHF Dx)?: No (No MD order for Palliative Care)  Transition of Care Consult (CM Consult): Discharge Planning  Discharge Durable Medical Equipment: No  Physical Therapy Consult: No  Occupational Therapy Consult: No  Speech Therapy Consult: No  Current Support Network: Own Home, Lives with Spouse  Confirm Follow Up Transport: Self   Resource Information Provided?: No  Discharge Location  Discharge Placement: Home     Met with the patient to review and discuss the plan of care and disposition plans. Patient lives in the home with her  and manages ok. No needs identified at this time. Care Manage Letter given to the patient and encouraged her to call if she should have any needs. Patient is in Inpatient status. IMM letter explained to her. She verbalizes understanding. Copy to her and copy placed in chart. Reason for Admission:  Bilateral Pneumonia                 RUR Score:  15%:  LOW                   Plan for utilizing home health:   TBD       PCP: First and Last name:  Abdirashid Fisher MD    Name of Practice:  [address] [place] [address]  Are you a current patient: Yes/No:  YES  Approximate date of last visit:  6/21/21  Can you participate in a virtual visit with your PCP:  YES                    Current Advanced Directive/Advance Care Plan: Full Code    Healthcare Decision Maker:   Click here to complete [employer] including selection of the Healthcare Decision Maker Relationship (ie \"Primary\")             Primary Decision MakerTfaheem Barrientos - [phone]    Secondary Decision Maker: Chong Shone - Alcira - [phone]                  Transition of Care Plan: To return home and will assess the need for home health prior to discharge.

## 2021-07-20 NOTE — PROGRESS NOTES
Spiritual Care Assessment/Progress Note  Segundo Jama      NAME: Rebecca Jimenez      MRN: [id number]  AGE: 67 y.o.  SEX: female  Presybeterian Affiliation: Confucianist   Language: English     7/20/2021     Total Time (in minutes): 8     Spiritual Assessment begun in [facility] MED[facility] through conversation with:         [x]Patient        [] Family    [] Friend(s)        Reason for Consult: Initial/Spiritual assessment, patient floor     Spiritual beliefs: (Please include comment if needed)     [x] Identifies with a tressa tradition:         [] Supported by a tressa community:            [] Claims no spiritual orientation:           [] Seeking spiritual identity:                [] Adheres to an individual form of spirituality:           [] Not able to assess:                           Identified resources for coping:      [] Prayer                               [] Music                  [] Guided Imagery     [x] Family/friends                 [] Pet visits     [] Devotional reading                         [] Unknown     [] Other:                                        Interventions offered during this visit: (See comments for more details)    Patient Interventions: Affirmation of emotions/emotional suffering, Affirmation of tressa, Iconic (affirming the presence of God/Higher Power), Prayer (assurance of)           Plan of Care:     [x] Support spiritual and/or cultural needs    [] Support AMD and/or advance care planning process      [] Support grieving process   [] Coordinate Rites and/or Rituals    [] Coordination with community clergy   [] No spiritual needs identified at this time   [] Detailed Plan of Care below (See Comments)  [] Make referral to Music Therapy  [] Make referral to Pet Therapy     [] Make referral to Addiction services  [] Make referral to [facility]  [] Make referral to Spiritual Care Partner  [] No future visits requested        [] Follow up upon further referrals     Comments: Initial spiritual assessment in . Patient/family shared about HER medical issues. Provided empathic listening and spiritual support. Advised of  Availability.   [phone] Paging [phone]

## 2021-07-20 NOTE — PROGRESS NOTES
Problem: Falls - Risk of  Goal: *Absence of Falls  Description: Document Maria Ines Gilmer Fall Risk and appropriate interventions in the flowsheet.   Outcome: Progressing Towards Goal  Note: Fall Risk Interventions:            Medication Interventions: Patient to call before getting OOB    Elimination Interventions: Call light in reach

## 2021-07-20 NOTE — PROGRESS NOTES
[facility]  Hospitalist Progress Note    NAME: Laura Casanova   :  1945   MRN:  [id number]     Total duration of encounter: 2 days      Interim Hospital Summary: 67 y.o. female who presented on 2021 with Bilateral pneumonia. She has a past medical history of Chronic obstructive pulmonary disease ([address] Environmental and seasonal allergies, GERD (gastroesophageal reflux disease), Herpes, colonoscopy (2017), Hypertension, and OA (osteoarthritis). She also has no past medical history of Endocrine disorder. Pt present with 2 week h/o worsening c/o cough, weakness, poor eating, vomiting. Seen in ED  and Rx Pred boost w/o benefit. F/u CXR on this Adm noted for B infiltrates. Covid and Inf testing negative. Tx CAP with Rocephin / Azthromycin with Solumedrol and Bronchodilators and Oxygen          Subjective:     Chief Complaint / Reason for Physician Visit  \"some better after coughing out some mucous\".   Discussed with RN   Tam severe SOB / Congestion earlier last evening following nebs  Nonproductive cough until later this morning  Some delirium a/w Tussionex  Has some cough drops from home    Review of Systems:  Symptom Y/N Comments  Symptom Y/N Comments   Fever/Chills n   Chest Pain n    Poor Appetite y   Edema n    Cough y   Abdominal Pain n    Sputum y  some clearing  Joint Pain n    SOB/JOHNSTON y   Pruritis/Rash n    Nausea/vomit y   Tolerating PT/OT     Diarrhea n   Tolerating Diet y    Constipation n   Other         Current Facility-Administered Medications:     cefTRIAXone (ROCEPHIN) 2 g in 0.9% sodium chloride (MBP/ADV) 50 mL MBP, 2 g, IntraVENous, Q24H, Ponce Sanabria MD, IV Completed at 21 1400    azithromycin (ZITHROMAX) 500 mg in 0.9% sodium chloride 250 mL (VIAL-MATE), 500 mg, IntraVENous, Q24H, Ponce Sanabria MD, Last Rate: 250 mL/hr at 21 1525, 500 mg at 21 1525    albuterol-ipratropium (DUO-NEB) 2.5 MG-0.5 MG/3 ML, 3 mL, Nebulization, QID RT, Hetal Pina MD, 3 mL at 07/20/21 1629    atorvastatin (LIPITOR) tablet 10 mg, 10 mg, Oral, DAILY, Hetal Pina MD, 10 mg at 07/19/21 0925    pantoprazole (PROTONIX) tablet 40 mg, 40 mg, Oral, ACB, Hetal Pina MD, 40 mg at 07/20/21 0543    fluticasone propionate (FLONASE) 50 mcg/actuation nasal spray 2 Spray, 2 Spray, Both Nostrils, DAILY, Hetal Pina MD, 2 Spray at 07/20/21 0953    losartan (COZAAR) tablet 50 mg, 50 mg, Oral, DAILY, Hetal Pina MD, 50 mg at 07/20/21 0949    montelukast (SINGULAIR) tablet 10 mg, 10 mg, Oral, QHS, Hetal Pina MD, 10 mg at 07/19/21 2106    sodium chloride (NS) flush 5-40 mL, 5-40 mL, IntraVENous, Q8H, Hetal Pina MD, 10 mL at 07/20/21 1422    sodium chloride (NS) flush 5-40 mL, 5-40 mL, IntraVENous, PRN, Hetal Pina MD    acetaminophen (TYLENOL) tablet 650 mg, 650 mg, Oral, Q6H PRN **OR** acetaminophen (TYLENOL) suppository 650 mg, 650 mg, Rectal, Q6H PRN, Hetal Pina MD    polyethylene glycol (MIRALAX) packet 17 g, 17 g, Oral, DAILY PRN, Hetal Pina MD, 17 g at 07/20/21 1004    ondansetron (ZOFRAN ODT) tablet 4 mg, 4 mg, Oral, Q8H PRN **OR** ondansetron (ZOFRAN) injection 4 mg, 4 mg, IntraVENous, Q6H PRN, Hetal Pina MD    enoxaparin (LOVENOX) injection 40 mg, 40 mg, SubCUTAneous, DAILY, Hetal Pina MD    methylPREDNISolone (PF) (SOLU-MEDROL) injection 80 mg, 80 mg, IntraVENous, Q8H, Hetal Pina MD, 80 mg at 07/20/21 1418    arformoteroL (BROVANA) neb solution 15 mcg, 15 mcg, Nebulization, BID RT, Hetal Pina MD, 15 mcg at 07/20/21 0858    budesonide (PULMICORT) 500 mcg/2 ml nebulizer suspension, 500 mcg, Nebulization, BID RT, Hetal Pina MD, 500 mcg at 07/20/21 0858    albuterol (PROVENTIL VENTOLIN) nebulizer solution 2.5 mg, 2.5 mg, Nebulization, Q4H PRN, Hetal Pina MD    HYDROcodone-chlorpheniramine (TUSSIONEX) oral suspension 5 mL, 5 mL, Oral, Q12H PRN, Hetal Pina MD, 5 mL at 07/19/21 2026    guaiFENesin ER [facility]) tablet 600 mg, 600 mg, Oral, Q12H, Seun Linder MD, 600 mg at 07/20/21 0948    Objective:     VITALS:   Patient Vitals for the past 12 hrs:   Temp Pulse Resp BP SpO2   07/20/21 1629 -- -- -- -- 100 %   07/20/21 1546 97.7 °F (36.5 °C) 93 20 (!) 149/76 94 %   07/20/21 1247 -- -- -- -- 99 %   07/20/21 1142 97.6 °F (36.4 °C) 94 20 137/71 93 %   07/20/21 0949 -- 89 -- (!) 148/74 --   07/20/21 0858 -- -- -- -- 97 %   07/20/21 0735 97.8 °F (36.6 °C) 89 20 (!) 148/74 93 %     No intake or output data in the 24 hours ending 07/20/21 1920     PHYSICAL EXAM:  General: WD, WN. Alert, cooperative, no acute distress    EENT:  EOMI. Anicteric sclerae. MMM  Resp:  Scant B rhonchi, nonprod cough, no wheezing or rales. No accessory muscle use  CV:  Regular  rhythm,  No edema  GI:  Soft, Non distended, Non tender. +Bowel sounds  Neurologic:  Alert and oriented X 3, normal speech, nonfocal  Psych: Moderately anxious and agitated  Skin:  No rashes. No jaundice    LABS:  I reviewed today's most current labs and imaging studies.   Pertinent labs include:  Recent Labs     07/19/21  0525 07/18/21  1056   WBC 7.8 13.3*   HGB 10.7* 12.2   HCT 31.9* 36.1    177     Recent Labs     07/19/21  0525 07/18/21  1056    134*   K 4.3 4.0    98   CO2 21 20*   * 102*   BUN 12 11   CREA 0.94 0.99   CA 8.5 8.8   MG 2.0 1.9   PHOS  --  2.4*   ALB  --  2.8*   TBILI  --  1.5*   ALT  --  46     CULTURES:  BC paired 7/18:  NG x 2 days  Viral Resp panel:  All negative    Results for Dayton Reyna (MRN [id number]) as of 7/20/2021 19:23   7/18/2021 11:01 7/18/2021 15:30   Influenza A by PCR Not detected    Influenza B by PCR Not detected    RSV by PCR  Not detected     Results for Dayton Reyna (MRN [id number]) as of 7/20/2021 19:23   7/19/2021 05:25   Procalcitonin 32.66   C-Reactive protein 13.30 (H)     Results for Dayton Reyna (MRN [id number]) as of 7/20/2021 19:23   7/19/2021 05:25   Sed rate, automated 109 (H)    RADIOLOGY  pCXR 7/18:  AP portable imaging of the chest performed at 11:01 AM demonstrates a  stable cardiomediastinal silhouette. . There are new bilateral patchy upper lobe  infiltrates. The lungs are otherwise clear. No pleural effusion is evident. There are bilateral calcified breast implants.   IMPRESSION  New bilateral patchy upper lobe infiltrates are nonspecific but may  represent viral pneumonia; radiographic follow-up is recommended to assure  complete resolution. PA/LAT CXR 7/20:  PA and lateral views of the chest were obtained. The calcified bilateral breast  implants are again noted. The cardiac silhouette is normal in size. There is  hyperaeration of the lungs. There has been interval improvement of aeration of  both lungs. Specifically, the previously described bilateral upper lobe  infiltrates are less conspicuous as seen on the current examination.   IMPRESSION:  Interval improvement of aeration of the lungs as described above. EKG 7/18:  bpm, NSTWC    Procedures: see electronic medical records for all procedures/Xrays and details which were not copied into this note but were reviewed prior to creation of Plan.         Assessment / Plan:    Principal Problem:    Bilateral pneumonia (7/18/2021)  The bilateral upper lobe infiltrates new compared to previous x-ray July 5  Associated generalized constitutional symptoms with malaise, myalgia, nausea  Presenting with lingering and worsening complaints of cough, congestion, shortness of breath, weakness  Has a long-term history of COPD that has not responded well to medications  She feels she is weaker and sicker at this time that she has in the past  Chest x-ray and constitutional complaints seem most consistent with a viral illness  Beginning empiric antibiotic treatment for CAP-Azithromycin and Rocephin  Initiate steroids with Solu-Medrol 80 mg every 8  Hold Trelegy and tx Pulmicort / Brovana bid  Maintained DuoNeb 4 times daily respiratory schedule  Supplement albuterol neb every 4 hours as needed  Maintain oxygen 2 L nasal cannula as needed  Add Mucinex 600 mg twice daily and Tussionex 1 teaspoon twice daily as needed  Viral respiratory panel negative  Note elevation in ESR, CRP and ProCalcitonin concerning  F/u PA LAT CXR improved or? Will confer with patient's pulmonologist in [facility]  -awaiting his call back     Active Problems:    COPD exacerbation ([facility] [address] (5/29/2020)  Suspect related to the infiltrates noted on x-ray  Adjust medications as noted above       Essential hypertension (12/6/2020)  Patient apparently has been switched from Diltiazem to Cozaar-clarified  Diltiazem would have been more effective for controlling her rapid heart rate complaints       GERD (gastroesophageal reflux disease) ()  Maintain PPI with house preferred Protonix (home Nexium)       Herpes ()  Patient has used Valtrex as needed acute symptoms, none current or every day         SAFETY:   Code Status:Full  DVT prophylaxis:Lovenox  Stress Ulcer prophylaxis: Protonix (home med Nexium)  Bladder catheter:no  Family Contact Info:  Primary Emergency Contact: Marques Crespo, Home Phone: [phone]  Bedded: [facility] Room 116/01  Disposition: TBD, likely home when stable  Admission status:  Inpatient       Reviewed most current lab test results and cultures  YES  Reviewed most current radiology test results   YES  Review and summation of old records today    NO  Reviewed patient's current orders and MAR    YES  PMH/SH reviewed - no change compared to H&P    Care Plan discussed with:                                   Comments  Patient x     Family  x    RN x     Care Manager  x     Consultant   x Dr. Annella Kayser [phone]                       Multidiciplinary team rounds were held today with , nursing, pharmacist and clinical coordinator.   Patient's plan of care was discussed; medications were reviewed and discharge planning was addressed.         ____________________________________________    Total NON Critical Care TIME:  40   Minutes        Comments   >50% of visit spent in counseling and coordination of care   x      Signed: Riley Thomas MD  [facility] Hospitalist  [phone]

## 2021-07-21 LAB
ANION GAP SERPL CALC-SCNC: 9 MMOL/L (ref 5–15)
BASOPHILS # BLD: 0 K/UL (ref 0–0.1)
BASOPHILS NFR BLD: 0 % (ref 0–1)
BUN SERPL-MCNC: 16 MG/DL (ref 6–20)
BUN/CREAT SERPL: 18 (ref 12–20)
CALCIUM SERPL-MCNC: 8.7 MG/DL (ref 8.5–10.1)
CHLORIDE SERPL-SCNC: 106 MMOL/L (ref 97–108)
CO2 SERPL-SCNC: 26 MMOL/L (ref 21–32)
COMMENT, HOLDF: NORMAL
CREAT SERPL-MCNC: 0.88 MG/DL (ref 0.55–1.02)
DIFFERENTIAL METHOD BLD: ABNORMAL
EOSINOPHIL # BLD: 0 K/UL (ref 0–0.4)
EOSINOPHIL NFR BLD: 0 % (ref 0–7)
ERYTHROCYTE [DISTWIDTH] IN BLOOD BY AUTOMATED COUNT: 12.6 % (ref 11.5–14.5)
GLUCOSE SERPL-MCNC: 135 MG/DL (ref 65–100)
HCT VFR BLD AUTO: 30.8 % (ref 35–47)
HGB BLD-MCNC: 10.4 G/DL (ref 11.5–16)
IMM GRANULOCYTES # BLD AUTO: 0.1 K/UL (ref 0–0.04)
IMM GRANULOCYTES NFR BLD AUTO: 1 % (ref 0–0.5)
LYMPHOCYTES # BLD: 0.4 K/UL (ref 0.8–3.5)
LYMPHOCYTES NFR BLD: 4 % (ref 12–49)
MCH RBC QN AUTO: 33.7 PG (ref 26–34)
MCHC RBC AUTO-ENTMCNC: 33.8 G/DL (ref 30–36.5)
MCV RBC AUTO: 99.7 FL (ref 80–99)
MONOCYTES # BLD: 0.3 K/UL (ref 0–1)
MONOCYTES NFR BLD: 3 % (ref 5–13)
NEUTS SEG # BLD: 8.4 K/UL (ref 1.8–8)
NEUTS SEG NFR BLD: 92 % (ref 32–75)
NRBC # BLD: 0 K/UL (ref 0–0.01)
NRBC BLD-RTO: 0 PER 100 WBC
PLATELET # BLD AUTO: 216 K/UL (ref 150–400)
PMV BLD AUTO: 10.1 FL (ref 8.9–12.9)
POTASSIUM SERPL-SCNC: 4 MMOL/L (ref 3.5–5.1)
RBC # BLD AUTO: 3.09 M/UL (ref 3.8–5.2)
RBC MORPH BLD: ABNORMAL
SAMPLES BEING HELD,HOLD: NORMAL
SODIUM SERPL-SCNC: 141 MMOL/L (ref 136–145)
WBC # BLD AUTO: 9.2 K/UL (ref 3.6–11)

## 2021-07-21 PROCEDURE — 94640 AIRWAY INHALATION TREATMENT: CPT

## 2021-07-21 PROCEDURE — 94760 N-INVAS EAR/PLS OXIMETRY 1: CPT

## 2021-07-21 PROCEDURE — 74011000250 HC RX REV CODE- 250: Performed by: INTERNAL MEDICINE

## 2021-07-21 PROCEDURE — 36415 COLL VENOUS BLD VENIPUNCTURE: CPT

## 2021-07-21 PROCEDURE — 74011250636 HC RX REV CODE- 250/636: Performed by: INTERNAL MEDICINE

## 2021-07-21 PROCEDURE — 65270000029 HC RM PRIVATE

## 2021-07-21 PROCEDURE — 77010033678 HC OXYGEN DAILY

## 2021-07-21 PROCEDURE — 80048 BASIC METABOLIC PNL TOTAL CA: CPT

## 2021-07-21 PROCEDURE — 74011250637 HC RX REV CODE- 250/637: Performed by: INTERNAL MEDICINE

## 2021-07-21 PROCEDURE — 74011000258 HC RX REV CODE- 258: Performed by: INTERNAL MEDICINE

## 2021-07-21 PROCEDURE — 85025 COMPLETE CBC W/AUTO DIFF WBC: CPT

## 2021-07-21 PROCEDURE — 87070 CULTURE OTHR SPECIMN AEROBIC: CPT

## 2021-07-21 RX ORDER — FACIAL-BODY WIPES
10 EACH TOPICAL DAILY PRN
Status: DISCONTINUED | OUTPATIENT
Start: 2021-07-21 | End: 2021-07-22 | Stop reason: HOSPADM

## 2021-07-21 RX ORDER — LORAZEPAM 0.5 MG/1
0.5 TABLET ORAL ONCE
Status: COMPLETED | OUTPATIENT
Start: 2021-07-21 | End: 2021-07-21

## 2021-07-21 RX ORDER — LOSARTAN POTASSIUM 100 MG/1
100 TABLET ORAL DAILY
Status: DISCONTINUED | OUTPATIENT
Start: 2021-07-22 | End: 2021-07-22 | Stop reason: HOSPADM

## 2021-07-21 RX ORDER — LORAZEPAM 0.5 MG/1
0.5 TABLET ORAL
Status: DISCONTINUED | OUTPATIENT
Start: 2021-07-21 | End: 2021-07-22 | Stop reason: HOSPADM

## 2021-07-21 RX ORDER — AMLODIPINE BESYLATE 5 MG/1
5 TABLET ORAL ONCE
Status: COMPLETED | OUTPATIENT
Start: 2021-07-21 | End: 2021-07-21

## 2021-07-21 RX ADMIN — AMLODIPINE BESYLATE 5 MG: 5 TABLET ORAL at 18:23

## 2021-07-21 RX ADMIN — LORAZEPAM 0.5 MG: 0.5 TABLET ORAL at 21:22

## 2021-07-21 RX ADMIN — CEFTRIAXONE SODIUM 2 G: 2 INJECTION, POWDER, FOR SOLUTION INTRAMUSCULAR; INTRAVENOUS at 12:35

## 2021-07-21 RX ADMIN — GUAIFENESIN 600 MG: 600 TABLET, EXTENDED RELEASE ORAL at 21:22

## 2021-07-21 RX ADMIN — IPRATROPIUM BROMIDE AND ALBUTEROL SULFATE 3 ML: .5; 3 SOLUTION RESPIRATORY (INHALATION) at 11:40

## 2021-07-21 RX ADMIN — Medication 10 ML: at 14:00

## 2021-07-21 RX ADMIN — METHYLPREDNISOLONE SODIUM SUCCINATE 40 MG: 40 INJECTION, POWDER, FOR SOLUTION INTRAMUSCULAR; INTRAVENOUS at 21:23

## 2021-07-21 RX ADMIN — FLUTICASONE PROPIONATE 2 SPRAY: 50 SPRAY, METERED NASAL at 09:11

## 2021-07-21 RX ADMIN — PANTOPRAZOLE SODIUM 40 MG: 40 TABLET, DELAYED RELEASE ORAL at 07:30

## 2021-07-21 RX ADMIN — Medication 10 ML: at 05:55

## 2021-07-21 RX ADMIN — AZITHROMYCIN MONOHYDRATE 500 MG: 500 INJECTION, POWDER, LYOPHILIZED, FOR SOLUTION INTRAVENOUS at 13:36

## 2021-07-21 RX ADMIN — HYDROCODONE POLISTIREX AND CHLORPHENIRAMINE POLISTIREX 5 ML: 10; 8 SUSPENSION, EXTENDED RELEASE ORAL at 21:22

## 2021-07-21 RX ADMIN — LORAZEPAM 0.5 MG: 0.5 TABLET ORAL at 12:35

## 2021-07-21 RX ADMIN — LOSARTAN POTASSIUM 50 MG: 50 TABLET, FILM COATED ORAL at 09:11

## 2021-07-21 RX ADMIN — IPRATROPIUM BROMIDE AND ALBUTEROL SULFATE 3 ML: .5; 3 SOLUTION RESPIRATORY (INHALATION) at 19:36

## 2021-07-21 RX ADMIN — Medication 10 ML: at 21:24

## 2021-07-21 RX ADMIN — MONTELUKAST 10 MG: 10 TABLET, FILM COATED ORAL at 21:23

## 2021-07-21 RX ADMIN — ATORVASTATIN CALCIUM 10 MG: 10 TABLET, FILM COATED ORAL at 21:22

## 2021-07-21 RX ADMIN — METHYLPREDNISOLONE SODIUM SUCCINATE 80 MG: 40 INJECTION, POWDER, FOR SOLUTION INTRAMUSCULAR; INTRAVENOUS at 13:36

## 2021-07-21 RX ADMIN — IPRATROPIUM BROMIDE AND ALBUTEROL SULFATE 3 ML: .5; 3 SOLUTION RESPIRATORY (INHALATION) at 17:53

## 2021-07-21 RX ADMIN — METHYLPREDNISOLONE SODIUM SUCCINATE 80 MG: 40 INJECTION, POWDER, FOR SOLUTION INTRAMUSCULAR; INTRAVENOUS at 05:55

## 2021-07-21 RX ADMIN — IPRATROPIUM BROMIDE AND ALBUTEROL SULFATE 3 ML: .5; 3 SOLUTION RESPIRATORY (INHALATION) at 08:16

## 2021-07-21 RX ADMIN — FLUTICASONE PROPIONATE 2 SPRAY: 50 SPRAY, METERED NASAL at 18:15

## 2021-07-21 RX ADMIN — GUAIFENESIN 600 MG: 600 TABLET, EXTENDED RELEASE ORAL at 09:11

## 2021-07-21 RX ADMIN — Medication 10 ML: at 12:42

## 2021-07-21 NOTE — ROUTINE PROCESS
Bedside and Verbal shift change report given to Ivy James RN (oncoming nurse) by Radha Lopez LPN (offgoing nurse). Report included the following information SBAR and Kardex   .

## 2021-07-21 NOTE — PROGRESS NOTES
Problem: Falls - Risk of  Goal: *Absence of Falls  Description: Document Ye Quintero Fall Risk and appropriate interventions in the flowsheet.   Outcome: Progressing Towards Goal  Note: Fall Risk Interventions:            Medication Interventions: Patient to call before getting OOB    Elimination Interventions: Call light in reach              Problem: Hypertension  Goal: *Blood pressure within specified parameters  Outcome: Progressing Towards Goal  Goal: *Fluid volume balance  Outcome: Progressing Towards Goal  Goal: *Labs within defined limits  Outcome: Progressing Towards Goal

## 2021-07-21 NOTE — PROGRESS NOTES
Notified by primary nurse, [facility], that patient's BP was elevated at midnight. Advised to continue to monitor, and recheck within a couple of hours. Recheck showed improvement, but still some elevation. Dr. Karthik Montes notified of current BP. Instructed to continue monitoring vitals as ordered on unit. No further orders received at this time.

## 2021-07-21 NOTE — PROGRESS NOTES
Blood pressure is elevated at present time. Will monitor bp (will recheck within an hour.    Charge nurse and Supervisor is aware of patient bp

## 2021-07-21 NOTE — PROGRESS NOTES
Patient complained of \"reaction\" to Brovana and Pulmicort. Stopped treatment. She uses duoneb at home but said it was \"different\" The Brovana and Pulmicort are being used as a substitute for Sincere Kerr which she uses once a day. BS clear but patient hoarse. Seems to be an upper airway irritation.

## 2021-07-21 NOTE — PROGRESS NOTES
Bedside and Verbal shift change report given to DOROTHY Monte LPN (oncoming nurse) by Amber Oneill RN   (offgoing nurse). Report included the following information SBAR, Kardex, Procedure Summary, Intake/Output, MAR, Accordion, Recent Results, Med Rec Status, Cardiac Rhythm SR, Alarm Parameters  and Pre Procedure Checklist.    Leora Nicholena score 2  Bed/chair alarm Yes in use. If in use it is set at the highest volume. Intravenous fluids were administered, none antibiotic  Patient Vitals for the past 12 hrs:   Temp Pulse Resp BP SpO2   07/20/21 2001 -- -- -- -- 98 %   07/20/21 2000 97.3 °F (36.3 °C) 83 20 (!) 164/83 96 %   07/20/21 1629 -- -- -- -- 100 %   07/20/21 1546 97.7 °F (36.5 °C) 93 20 (!) 149/76 94 %   07/20/21 1247 -- -- -- -- 99 %   07/20/21 1142 97.6 °F (36.4 °C) 94 20 137/71 93 %     No flowsheet data found. Lab results reviewed. For significant abnormal values and values requiring intervention, see assessment and plan.

## 2021-07-21 NOTE — PROGRESS NOTES
Per MD request - called [facility] to make a new patient appointment. [address] [place] [address] already has an appointment scheduled for this patient on August 10th, at 0730. Was told the appointment was made this morning. Dr. Jazmin Johnson made aware.

## 2021-07-21 NOTE — PROGRESS NOTES
Problem: Falls - Risk of  Goal: *Absence of Falls  Description: Document Lamar Fall Risk and appropriate interventions in the flowsheet.   Outcome: Progressing Towards Goal  Note: Fall Risk Interventions:            Medication Interventions: Patient to call before getting OOB, Teach patient to arise slowly    Elimination Interventions: Call light in reach

## 2021-07-22 VITALS
SYSTOLIC BLOOD PRESSURE: 189 MMHG | DIASTOLIC BLOOD PRESSURE: 89 MMHG | TEMPERATURE: 98.1 F | WEIGHT: 123 LBS | BODY MASS INDEX: 21.79 KG/M2 | HEIGHT: 63 IN | RESPIRATION RATE: 22 BRPM | OXYGEN SATURATION: 95 % | HEART RATE: 98 BPM

## 2021-07-22 NOTE — PROGRESS NOTES
Contacted [facility] to arrange S transport of patient to [address] [address] bed# 3256. Spoke with [facility]. Discussed patient condition, and need for 2.5L NC. [facility] will call back with JORGE.

## 2021-07-22 NOTE — PROGRESS NOTES
Bedside and Verbal shift change report given to TIM Abad RN (oncoming nurse) by Zuleyma Dye RN   (offgoing nurse). Report included the following information SBAR, Kardex, Procedure Summary, Intake/Output, MAR, Accordion, Recent Results and Med Rec Status. Witt score 1  Bed/chair alarm NO in use. If in use it is set at the highest volume. Intravenous fluids were administered, IV antibiotics  Patient Vitals for the past 12 hrs:   Temp Pulse Resp BP SpO2   07/21/21 2156 97.7 °F (36.5 °C) 100 22 (!) 149/83 94 %   07/21/21 1936 -- -- -- -- 96 %   07/21/21 1709 98.5 °F (36.9 °C) 72 20 (!) 189/88 97 %   07/21/21 1639 -- -- -- -- 94 %   07/21/21 1152 97.5 °F (36.4 °C) 80 20 (!) 181/82 98 %     No flowsheet data found. Lab results reviewed. For significant abnormal values and values requiring intervention, see assessment and plan. MD aware of patient increased BP and SOB. New one time order for   Amlodipine 5 mg tab PO  Given at 1823 with good results.

## 2021-07-22 NOTE — PROGRESS NOTES
Gracy at [facility] called, and stated patient's ETA is now 0000. Patient and charge nurse, Erik Lee, updated.

## 2021-07-22 NOTE — PROGRESS NOTES
[facility]  Hospitalist Progress Note    NAME: Diego Giang   :  1945   MRN:  [id number]     Total duration of encounter: 3 days      Interim Hospital Summary: 67 y.o. female who presented on 2021 with Bilateral pneumonia. She has a past medical history of Chronic obstructive pulmonary disease ([address] Environmental and seasonal allergies, GERD (gastroesophageal reflux disease), Herpes, colonoscopy (2017), Hypertension, and OA (osteoarthritis). She also has no past medical history of Endocrine disorder. Pt present with 2 week h/o worsening c/o cough, weakness, poor eating, vomiting. Seen in ED  and Rx Pred boost w/o benefit. F/u CXR on this Adm noted for B infiltrates. Covid and Inf testing negative. Tx CAP with Rocephin / Azthromycin with Solumedrol and Bronchodilators and Oxygen          Subjective:     Chief Complaint / Reason for Physician Visit  \"weak, SOB\".   Discussed with RN   Feels the Brovana/Pulmicort worsens the cough / SOB    Some delirium a/w Tussionex  Has some cough drops from home  Admits to anxiety with symptoms / illness    Review of Systems:  Symptom Y/N Comments  Symptom Y/N Comments   Fever/Chills n   Chest Pain n    Poor Appetite y   Edema n    Cough y   Abdominal Pain n    Sputum y  scant (sent)  Joint Pain n    SOB/JOHNSTON y   Pruritis/Rash n    Nausea/vomit y   Tolerating PT/OT     Diarrhea n   Tolerating Diet y    Constipation n   Other         Current Facility-Administered Medications:     bisacodyL (DULCOLAX) suppository 10 mg, 10 mg, Rectal, DAILY PRN, Mateo Penaloza MD    fluticasone-umeclidin-vilanter 498-04.9-85 mcg dsdv 1 Inhalation (Patient Supplied), 1 Inhalation, Inhalation, DAILY, Mateo Penaloza MD, 1 Inhalation at 21 1411    LORazepam (ATIVAN) tablet 0.5 mg, 0.5 mg, Oral, BID PRN, Mateo Penaloza MD    [START ON 2021] losartan (COZAAR) tablet 100 mg, 100 mg, Oral, DAILY, Mateo Penaloza MD    methylPREDNISolone (PF) (SOLU-MEDROL) injection 40 mg, 40 mg, IntraVENous, Q8H, Lenora Bowen MD    cefTRIAXone (ROCEPHIN) 2 g in 0.9% sodium chloride (MBP/ADV) 50 mL MBP, 2 g, IntraVENous, Q24H, Lenora Bowen MD, Last Rate: 100 mL/hr at 07/21/21 1235, 2 g at 07/21/21 1235    azithromycin (ZITHROMAX) 500 mg in 0.9% sodium chloride 250 mL (VIAL-MATE), 500 mg, IntraVENous, Q24H, Lenora Bowen MD, Last Rate: 250 mL/hr at 07/21/21 1336, 500 mg at 07/21/21 1336    albuterol-ipratropium (DUO-NEB) 2.5 MG-0.5 MG/3 ML, 3 mL, Nebulization, QID RT, Lenora Bowen MD, 3 mL at 07/21/21 1936    atorvastatin (LIPITOR) tablet 10 mg, 10 mg, Oral, DAILY, Lenora Bowen MD, 10 mg at 07/19/21 0925    pantoprazole (PROTONIX) tablet 40 mg, 40 mg, Oral, ACB, Lenora Bowen MD, 40 mg at 07/21/21 0730    fluticasone propionate (FLONASE) 50 mcg/actuation nasal spray 2 Spray, 2 Spray, Both Nostrils, DAILY, Lenora Bowen MD, 2 [place] at 07/21/21 1815    montelukast (SINGULAIR) tablet 10 mg, 10 mg, Oral, QHS, Lenora Bowen MD, 10 mg at 07/20/21 2203    sodium chloride (NS) flush 5-40 mL, 5-40 mL, IntraVENous, Q8H, Lenora Bowen MD, 10 mL at 07/21/21 1400    sodium chloride (NS) flush 5-40 mL, 5-40 mL, IntraVENous, PRN, Lenora Bowen MD, 10 mL at 07/21/21 1242    acetaminophen (TYLENOL) tablet 650 mg, 650 mg, Oral, Q6H PRN **OR** acetaminophen (TYLENOL) suppository 650 mg, 650 mg, Rectal, Q6H PRN, Lenora Bowne MD    polyethylene glycol (MIRALAX) packet 17 g, 17 g, Oral, DAILY PRN, Lenora Bowen MD, 17 g at 07/20/21 1004    ondansetron (ZOFRAN ODT) tablet 4 mg, 4 mg, Oral, Q8H PRN **OR** ondansetron (ZOFRAN) injection 4 mg, 4 mg, IntraVENous, Q6H PRN, Lenora Bowen MD    enoxaparin (LOVENOX) injection 40 mg, 40 mg, SubCUTAneous, DAILY, Lenora Bowen MD    albuterol (PROVENTIL VENTOLIN) nebulizer solution 2.5 mg, 2.5 mg, Nebulization, Q4H PRN, Lenora Bowen MD    HYDROcodone-chlorpheniramine (TUSSIONEX) oral suspension 5 mL, 5 mL, Oral, Q12H PRN, Radha Rodarte MD, 5 mL at 07/20/21 2044    guaiFENesin ER (MUCINEX) tablet 600 mg, 600 mg, Oral, Q12H, Radha Rodarte MD, 600 mg at 07/21/21 0911    Objective:     VITALS:   Patient Vitals for the past 12 hrs:   Temp Pulse Resp BP SpO2   07/21/21 1936 -- -- -- -- 96 %   07/21/21 1709 98.5 °F (36.9 °C) 72 20 (!) 189/88 97 %   07/21/21 1152 97.5 °F (36.4 °C) 80 20 (!) 181/82 98 %       Intake/Output Summary (Last 24 hours) at 7/21/2021 2028  Last data filed at 7/21/2021 1800  Gross per 24 hour   Intake 480 ml   Output --   Net 480 ml        PHYSICAL EXAM:  General: WD, WN. Alert, cooperative, no acute distress    EENT:  EOMI. Anicteric sclerae. MMM  Resp:  Scant B rhonchi, nonprod cough, no wheezing or rales. No accessory muscle use  CV:  Regular  rhythm,  No edema  GI:  Soft, Non distended, Non tender. +Bowel sounds  Neurologic:  Alert and oriented X 3, normal speech, nonfocal  Psych: Moderately anxious and agitated  Skin:  No rashes. No jaundice    LABS:  I reviewed today's most current labs and imaging studies.   Pertinent labs include:  Recent Labs     07/21/21  0539 07/19/21  0525   WBC 9.2 7.8   HGB 10.4* 10.7*   HCT 30.8* 31.9*    176     Recent Labs     07/21/21  0539 07/19/21  0525    139   K 4.0 4.3    104   CO2 26 21   * 146*   BUN 16 12   CREA 0.88 0.94   CA 8.7 8.5   MG  --  2.0     CULTURES:  BC paired 7/18:  NG x 3 days  Viral Resp panel:  All negative  Sputum 7/21:  pending    Results for Felicia Marshall (MRN [id number]) as of 7/20/2021 19:23   7/18/2021 11:01 7/18/2021 15:30   Influenza A by PCR Not detected    Influenza B by PCR Not detected    RSV by PCR  Not detected     Results for Felicia Marshall (MRN [id number]) as of 7/20/2021 19:23   7/19/2021 05:25   Procalcitonin 32.66   C-Reactive protein 13.30 (H)     Results for Felicia Marshall (MRN [id number]) as of 7/20/2021 19:23   7/19/2021 05:25   Sed rate, automated 109 (H)         RADIOLOGY  pCXR 7/18:  AP portable imaging of the chest performed at 11:01 AM demonstrates a  stable cardiomediastinal silhouette. . There are new bilateral patchy upper lobe  infiltrates. The lungs are otherwise clear. No pleural effusion is evident. There are bilateral calcified breast implants.   IMPRESSION  New bilateral patchy upper lobe infiltrates are nonspecific but may  represent viral pneumonia; radiographic follow-up is recommended to assure  complete resolution. PA/LAT CXR 7/20:  PA and lateral views of the chest were obtained. The calcified bilateral breast  implants are again noted. The cardiac silhouette is normal in size. There is  hyperaeration of the lungs. There has been interval improvement of aeration of  both lungs. Specifically, the previously described bilateral upper lobe  infiltrates are less conspicuous as seen on the current examination.   IMPRESSION:  Interval improvement of aeration of the lungs as described above. EKG 7/18:  bpm, NSTWC    Procedures: see electronic medical records for all procedures/Xrays and details which were not copied into this note but were reviewed prior to creation of Plan.         Assessment / Plan:    Principal Problem:    Bilateral pneumonia (7/18/2021)  The bilateral upper lobe infiltrates new compared to previous x-ray July 5  Associated generalized constitutional symptoms with malaise, myalgia, nausea  Presenting with lingering and worsening complaints of cough, congestion, shortness of breath, weakness  Has a long-term history of COPD that has not responded well to medications  She feels she is weaker and sicker at this time that she has in the past  Chest x-ray and constitutional complaints seem most consistent with a viral illness  Beginning empiric antibiotic treatment for CAP-Azithromycin and Rocephin  Initiate steroids with Solu-Medrol 80 mg every 8 - to taper with anxiety c/o and rising BP  Resume home Trelegy noting poor tolerance to  Pulmicort / Cathren Yanet DuoNeb 4 times daily respiratory schedule  Supplement albuterol neb every 4 hours as needed  Maintain oxygen 2 L nasal cannula as needed  Cont tx Mucinex 600 mg twice daily and Tussionex 1 teaspoon twice daily as needed  Viral respiratory panel negative  Note elevation in ESR, CRP and ProCalcitonin concerning  F/u PA LAT CXR improved or? Conferred with patient's pulmonologist in [place] (Dr. Anusha Cedillo)  Requests transfer to [facility]  Accepted to Hospitalist service by Dr. Zuri Flores. Kathy - awaiting M/S bed  Plans for Bronchoscopy following CT there  Trial Ativan 0.5mg for anxiety     Active Problems:    COPD exacerbation ([address] (5/29/2020)  Suspect related to the infiltrates noted on x-ray  Adjust medications as noted above       Essential hypertension (12/6/2020)  Patient apparently has been switched from Diltiazem to Cozaar-clarified  Diltiazem would have been more effective for controlling her rapid heart rate complaints  With elevation advance Cozaar to 100mg in AM  Give Norvasc 5mg this evening       GERD (gastroesophageal reflux disease) ()  Maintain PPI with house preferred Protonix (home Nexium)       Herpes ()  Patient has used Valtrex as needed acute symptoms, none current or every day         SAFETY:   Code Status:Full  DVT prophylaxis:Lovenox  Stress Ulcer prophylaxis: Protonix (home med Nexium)  Bladder catheter:no  Family Contact Info:  Primary Emergency Contact: Marques Crespo, Home Phone: [phone]  Arlie Mussel [facility] Room 116/01  Disposition: Planning transfer to [facility] Dr. Zuri Flores.  Kathy, Hospitalist  Admission status:  Inpatient       Reviewed most current lab test results and cultures  YES  Reviewed most current radiology test results   YES  Review and summation of old records today    NO  Reviewed patient's current orders and MAR    YES  PMH/SH reviewed - no change compared to H&P    Care Plan discussed with:                                   Comments  Patient x     Family  x    RN x     Care Manager  x     Consultant   x Dr. Char Yancey [phone]                       Multidiciplinary team rounds were held today with , nursing, pharmacist and clinical coordinator. Patient's plan of care was discussed; medications were reviewed and discharge planning was addressed.         ____________________________________________    Total NON Critical Care TIME:  40   Minutes        Comments   >50% of visit spent in counseling and coordination of care   x      Signed: Thao Rascon MD  [facility] Hospitalist  [phone]

## 2021-07-22 NOTE — PROGRESS NOTES
Problem: Falls - Risk of  Goal: *Absence of Falls  Description: Document Trever Palmer Fall Risk and appropriate interventions in the flowsheet.   Outcome: Progressing Towards Goal  Note: Fall Risk Interventions:            Medication Interventions: Patient to call before getting OOB    Elimination Interventions: Call light in reach              Problem: Pneumonia: Day 1  Goal: Off Pathway (Use only if patient is Off Pathway)  Outcome: Progressing Towards Goal  Goal: Medications  Outcome: Progressing Towards Goal  Goal: Respiratory  Outcome: Progressing Towards Goal  Goal: Treatments/Interventions/Procedures  Outcome: Progressing Towards Goal     Problem: Hypertension  Goal: *Blood pressure within specified parameters  Outcome: Progressing Towards Goal  Goal: *Fluid volume balance  Outcome: Progressing Towards Goal

## 2021-07-22 NOTE — PROGRESS NOTES
Report called to Rory Crowley at [facility], pt to go to bed 4501. Bossman Britt informed of medications given and time given. Assessments done.  Georges MARKS

## 2021-07-22 NOTE — PROGRESS NOTES
Transition of Care (WILDER) Plan:  Patient transferred to higher level of care: [facility] [place] services not provided at [facility] [place]. RUR: 69% LOW    Click here to complete [address] including selection of the Healthcare Decision Maker Relationship (ie \"Primary\")  @[email or web address]    Patient's  is her Primary healthcare DecisionMaker    Care Management Interventions  PCP Verified by CM:  Yes Megan Serra MD)  Last Visit to PCP: 06/21/21  Palliative Care Criteria Met (RRAT>21 & CHF Dx)?: No (No MD order for Palliative Care)  Transition of Care Consult (CM Consult): Discharge Planning  Discharge Durable Medical Equipment: No  Physical Therapy Consult: No  Occupational Therapy Consult: No  Speech Therapy Consult: No  Current Support Network: Own Home, Lives with Spouse  Confirm Follow Up Transport: Self  [place] Resource Information Provided?: No  Discharge Location  Discharge Placement: Transferred to higher level of care [facility])

## 2021-07-23 LAB
BACTERIA SPEC CULT: ABNORMAL
BACTERIA SPEC CULT: ABNORMAL
GRAM STN SPEC: ABNORMAL
SERVICE CMNT-IMP: ABNORMAL

## 2021-07-24 LAB
BACTERIA SPEC CULT: NORMAL
BACTERIA SPEC CULT: NORMAL
SERVICE CMNT-IMP: NORMAL
SERVICE CMNT-IMP: NORMAL

## 2021-07-25 NOTE — DISCHARGE SUMMARY
[facility]  Hospitalist Discharge Summary    Patient ID:  Magno Lawson  [id number]  67 y.o.  1945    PCP on record: Ellie Lucas MD    Admit date: 7/18/2021  Discharge date and time: 7/25/2021     DISCHARGE DIAGNOSIS:    Principal Problem:    Bilateral pneumonia (7/18/2021)    Active Problems:    COPD exacerbation ([address] (5/29/2020)    Essential hypertension (12/6/2020)    GERD (gastroesophageal reflux disease) ()    Herpes ()    CONSULTATIONS:  None    Excerpted HPI from H&P of Panda Kaufman MD:  67 y.o. female presenting for admission to [facility] for further evaluation and treatment for Bilateral pneumonia. She  has a past medical history of Chronic obstructive pulmonary disease ([facility] [address] Environmental and seasonal allergies, GERD (gastroesophageal reflux disease), Herpes, colonoscopy (05/22/2017), Hypertension, and OA (osteoarthritis). She also has no past medical history of Endocrine disorder. . She presents to the ED with complaints of extreme weakness and fatigue associated with cough and shortness of breath was which has markedly progressed over the past couple days but has been developing over the past month. The patient was seen in the ED on July 5 and was diagnosed with COPD exacerbation and was discharged on her routine bronchodilators oxygen and a new prescription for prednisone 60 mg daily for 4 days. She noted some clinical improvement but since has had gradual deterioration. She has congestion and cough but no mucus production. She denies complaints with fever or chill. On presentation her white count is elevated slightly. She has had chronic illness which has not responded well to medications and has transferred to several different pulmonologist over the past couple years. She has recently undergone a cardiac work-up in [place] and was not prescribed any adjusted medications.   She is presently followed by pulmonary medicine in [facility] Dr. Iain Brady. They are following a nodule RUL, which may need a biopsy. She has had O2 by nasal cannula available for several years following a hospitalization but does not use it routinely. He has not used her DuoNeb for several days feeling that it aggravated palpitation and nausea. He has been markedly nauseous for several days and has not been eating well. She attempts to take her medications with food to avoid adverse GI effects. She denies any diarrhea or vomiting. She has documented reflux and chronically takes Nexium. Nuclear Stress Test 6/11  · The EKG stress is negative for ischemia. · LV perfusion is probably abnormal with no evidence of inducible ischemia. · There is a small size, mild severity, fixed defect in the apical region. Which may represent a small prior infarct. · Mildly decreased ejection fraction at rest 52% which increases to 60% on stress.  No focal wall motion abnormality.     ECHO 5/18/21:  · Technically difficult study with limited acoustic windows  · Normal left ventricular size and wall thickness, poor endocardial   definition although the apical views suggest that there is mild to   moderate impairment of left ventricular systolic function with an ejection   fraction estimated to be 45%  · Sclerotic but not stenotic aortic valve with probably moderate aortic   regurgitation, pressure half-time 319 millisecond  · Mildly sclerotic mitral valve with mild mitral regurgitation  · Increased left atrial volume  · Normal right heart size with pulmonary artery systolic pressure   estimated to be 30 mm of mercury  · Limited diastolic assessment was possible  · There are no old studies available for comparison     NM PET/CT 3/10/21  Nodular region in the lateral aspect of the right lung apex demonstrates low level metabolic activity with an SUV of approximately 1.5.  This could reflect some inflammatory change.  Etiology such as bronchoalveolar cell carcinoma can also demonstrate low level metabolic activity.  Severe emphysematous changes within the lungs.  Uptake in the esophagus suggesting esophagitis or reflux.  No evidence of metastatic disease.     CT CHEST 1/12/21  1. Presence of a focal, ill-defined, triangular shaped soft tissue lesion in the  lateral aspect of the right upper lobe as described above. 2. Continued evidence of chronic fibrotic scarring in the right upper lobe. Evidence of some bullous change in this region. 3. Interval resolution of the previously described groundglass densities in the  mid/lower portion of both lungs. ______________________________________________________________________  DISCHARGE SUMMARY/HOSPITAL COURSE:  for full details see H&P, daily progress notes, labs, consult notes.        Pt present with 2 week h/o worsening c/o cough, weakness, poor eating, vomiting. Seen in ED 7/5 and Rx Pred boost w/o benefit. F/u CXR on this Adm noted for B infiltrates. Covid and Inf testing negative.  Tx CAP with Rocephin / Azthromycin with Solumedrol and Bronchodilators and Oxygen     Principal Problem:    Bilateral pneumonia (7/18/2021)  The bilateral upper lobe infiltrates new compared to previous x-ray July 5  Associated generalized constitutional symptoms with malaise, myalgia, nausea  Presenting with lingering and worsening complaints of cough, congestion, shortness of breath, weakness  Has a long-term history of COPD that has not responded well to medications  She feels she is weaker and sicker at this time that she has in the past  Chest x-ray and constitutional complaints seem most consistent with a viral illness  Beginning empiric antibiotic treatment for CAP-Azithromycin and Rocephin  Initiate steroids with Solu-Medrol 80 mg every 8, tapered to 40mg q8 due to anxiety and rising BP  Treated with bid Pulmicort / [place] bid as alternative to Trelegy  This was not tolerated well with worsening cough c/o - resumed home Trelegy  Maintained DuoNeb 4 times daily respiratory schedule and Supplement albuterol neb every 4 hours as needed  Maintain oxygen 2 L nasal cannula as needed  Add Mucinex 600 mg twice daily and Tussionex 1 teaspoon twice daily as needed with benefit for cough c/o  Viral respiratory panel resulted negative  Noted elevation in ESR, CRP and ProCalcitonin concerning  F/u PA LAT CXR noted improvement or resolution in the infiltrate noted on the initial portable   Conferred with patient's pulmonologist in [address] (Dr. Lanie Siu)  Requests transfer to [facility]  Accepted to Hospitalist service by Dr. Harry Alston. [facility] - awaiting M/S bed  Plans for Bronchoscopy following CT there  Trial Ativan 0.5mg for anxiety     Active Problems:    COPD exacerbation (HCC) (5/29/2020)  Suspect related to the infiltrates noted on x-ray  Adjust medications as noted above  Mostly emphysema component       Essential hypertension (12/6/2020)  Patient apparently has been switched from Diltiazem to Cozaar-need to clarify with home bottles  Diltiazem would have been more effective for controlling her rapid heart rate complaints  With elevation advanced Cozaar to 100mg in AM  Give Norvasc 5mg on the evening prior to transfer      GERD (gastroesophageal reflux disease) ()  Maintain PPI with house preferred Protonix       Herpes ()  Patient has used Valtrex as needed acute symptoms, none current    _______________________________________________________________________  Patient seen and examined by me on discharge day. Pertinent Findings:  Visit Vitals  BP (!) 189/89 (BP 1 Location: Right upper arm, BP Patient Position: At rest)   Pulse 98   Temp 98.1 °F (36.7 °C)   Resp 22   Ht 5' 2.5\" (1.588 m)   Wt 55.8 kg (123 lb)   SpO2 95%   Breastfeeding No   BMI 22.14 kg/m²     Gen:    Not in distress  Chest: Nonlabored respiration, Clear lungs  CVS:   Regular rhythm.   No edema  Abd:  Soft, not distended, not tender  Neuro:  Alert, nonfocal, weak    LABS:  Results for KATIE RAO (MRN [id number]) as of 7/25/2021 00:13   7/18/2021 10:56 7/19/2021 05:25 7/21/2021 05:39   WBC 13.3 (H) 7.8 9.2   NRBC 0.0 0.0 0.0   RBC 3.62 (L) 3.19 (L) 3.09 (L)   HGB 12.2 10.7 (L) 10.4 (L)   HCT 36.1 31.9 (L) 30.8 (L)   MCV 99.7 (H) 100.0 (H) 99.7 (H)   MCH 33.7 33.5 33.7   MCHC 33.8 33.5 33.8   RDW 12.7 12.4 12.6   PLATELET 342 438 852   MPV 9.8 10.1 10.1   NEUTROPHILS 81 (H) 90 (H) 92 (H)   LYMPHOCYTE 7 (L) 6 (L) 4 (L)   MONOCYTES 10 3 (L) 3 (L)   EOSINOPHILS 2 0 0     Results for Lelia Nicholson (MRN [id number]) as of 7/25/2021 00:13   7/18/2021 13:45   Color YELLOW/STRAW   Appearance CLEAR   Specific gravity 1.010   pH (UA) 7.0   Protein Negative   Glucose Negative   Ketone TRACE (A)   Blood Negative   Bilirubin Negative   Urobilinogen 1.0   Nitrites Negative   Leukocyte Esterase SMALL (A)   Epithelial cells FEW   WBC 0-4   RBC 0-5   Bacteria Negative     Results for Lelia Nicholson (MRN [id number]) as of 7/25/2021 00:13   7/18/2021 10:56 7/19/2021 05:25 7/21/2021 05:39   Sodium 134 (L) 139 141   Potassium 4.0 4.3 4.0   Chloride 98 104 106   CO2 20 (L) 21 26   Anion gap 16 (H) 14 9   Glucose 102 (H) 146 (H) 135 (H)   BUN 11 12 16   Creatinine 0.99 0.94 0.88   BUN/Cr ratio 11 (L) 13 18   Calcium 8.8 8.5 8.7   Phosphorus 2.4 (L)     Magnesium 1.9 2.0    GFR  non-AA 54 (L) 58 (L) >60   Bilirubin, total 1.5 (H)     Protein, total 7.1     Albumin 2.8 (L)     Globulin 4.3 (H)     A-G Ratio 0.7 (L)     ALT 46     AST 35     Alk.  phos 105     Lactic acid 0.8     Procalcitonin  32.66    Troponin-I, Qt. <0.05     C-R protein  13.30 (H)        CULTURES:  BC paired 7/18:  NG x 6 days  Viral Resp panel:  All negative  Sputum 7/21:  light normal respiratory meme, light yeast - candida ablicans     Results for Lelia Nicholson (MRN [id number]) as of 7/20/2021 19:23    7/18/2021 11:01 7/18/2021 15:30   Influenza A by PCR Not detected     Influenza B by PCR Not detected     RSV by PCR   Not detected      Results for HOSE, Jaylan Salinas (MRN [id number]) as of 7/20/2021 19:23    7/19/2021 05:25   Procalcitonin 32.66   C-Reactive protein 13.30 (H)      Results for Shahram Garnica (MRN [id number]) as of 7/20/2021 19:23    7/19/2021 05:25   Sed rate, automated 109 (H)        pCXR 7/18:  AP portable imaging of the chest performed at 11:01 AM demonstrates a  stable cardiomediastinal silhouette. . There are new bilateral patchy upper lobe  infiltrates. The lungs are otherwise clear. No pleural effusion is evident. There are bilateral calcified breast implants.   IMPRESSION  New bilateral patchy upper lobe infiltrates are nonspecific but may  represent viral pneumonia; radiographic follow-up is recommended to assure  complete resolution.     PA/LAT CXR 7/20:  PA and lateral views of the chest were obtained. The calcified bilateral breast  implants are again noted. The cardiac silhouette is normal in size. There is  hyperaeration of the lungs. There has been interval improvement of aeration of  both lungs. Specifically, the previously described bilateral upper lobe  infiltrates are less conspicuous as seen on the current examination.   IMPRESSION:  Interval improvement of aeration of the lungs as described above.     EKG 7/18:  bpm, NSTWC    _______________________________________________________________________  DISCHARGE MEDICATIONS:   Discharge Medication List as of 7/22/2021  1:24 AM      No current facility-administered medications for this encounter. Current Outpatient Medications:     guaiFENesin ER (Mucinex) 600 mg ER tablet, Take 600 mg by mouth., Disp: , Rfl:     losartan (COZAAR) 50 mg tablet, Take 50 mg by mouth daily. 2 tabs per day, Disp: , Rfl:     albuterol (PROVENTIL HFA, VENTOLIN HFA, PROAIR HFA) 90 mcg/actuation inhaler, Take 2 Puffs by inhalation every four (4) hours as needed. , Disp: , Rfl:     fluticasone-umeclidin-vilanter (Trelegy Ellipta) 200-62.5-25 mcg dsdv, Take 1 Puff by inhalation daily. , Disp: , Rfl:    esomeprazole (NEXIUM) 20 mg capsule, Take 20 mg by mouth., Disp: , Rfl:     albuterol-ipratropium (DUO-NEB) 2.5 mg-0.5 mg/3 ml nebu, 3 mL by Nebulization route every four (4) hours as needed for Wheezing., Disp: 30 Nebule, Rfl: 1    sodium chloride 3 % nebulizer solution, TAKE 4 ML BY NEBULIZATION DAILY, Disp: , Rfl:     Tobradex ophthalmic ointment, APPLY TO EACH EYE AT BEDTIME AS NEEDED, Disp: , Rfl:     Oxygen, 1 Each by Nasal route daily as needed. Uses one liter of oxygen as needed, Disp: , Rfl:     valACYclovir (VALTREX) 500 mg tablet, 1 pill twice daily for 5 days if needed for outbreak, Disp: , Rfl:     montelukast (SINGULAIR) 10 mg tablet, Take 10 mg by mouth At bedtime. , Disp: , Rfl:     atorvastatin (LIPITOR) 10 mg tablet, Take 20 mg by mouth daily. , Disp: , Rfl:     multivitamin (MULTIPLE VITAMINS PO), Take  by mouth., Disp: , Rfl:     fluticasone (FLONASE) 50 mcg/actuation nasal spray, One spray per nostril twice a day  Indications: ALLERGIC RHINITIS, Disp: 1 Bottle, Rfl: 5    dilTIAZem ER (CARDIZEM CD) 180 mg capsule, , Disp: , Rfl:     Daliresp 500 mcg tab tablet, TAKE 1/2 TABLET BY MOUTH DAILY FOR 30 DAYS,THEN TAKE 1 TAB DAILY (Patient not taking: Reported on 7/18/2021), Disp: , Rfl:     azithromycin (Zithromax Z-Tavares) 250 mg tablet, 2 tabs today and then 1 tab daily until completed (Patient not taking: Reported on 7/18/2021), Disp: 6 Tab, Rfl: 0    levoFLOXacin (LEVAQUIN) 750 mg tablet, Take 1 Tab by mouth daily. Indications: pneumonia (Patient not taking: Reported on 7/18/2021), Disp: 5 Tab, Rfl: 0    potassium chloride SR (K-TAB) 20 mEq tablet, TAKE 1 TABLET BY MOUTH ONCE DAILY FOR LOW POTASSIUM, Disp: , Rfl:     amLODIPine (NORVASC) 5 mg tablet, Indications: High Blood Pressure Disorder 1 pill daily for blood pressure (Patient not taking: Reported on 7/18/2021), Disp: , Rfl:     loratadine (CLARITIN) 10 mg tablet, Take 1 Tab by mouth daily.  Indications: ALLERGIC RHINITIS (Patient not taking: Reported on 7/18/2021), Disp: 30 Tab, Rfl: 4    naproxen sodium (ALEVE) 220 mg cap, Take  by mouth., Disp: , Rfl:       My Recommended  Diet: Regular  Activity: As tolerated   Wound Care: none  Follow-up labs: routine    ______________________________________________________________________  DISPOSITION:    Home with Family:    Home with HH/PT/OT/RN:    SNF/LTC:    [facility]:    OTHER: [facility]     Transferred to higher level of care for additional evaluation and treatment  Condition at Discharge:  Stable  _____________________________________________________________________  Follow up with:   PCP : Tracy Keith MD  Follow-up Information     Follow up With Specialties Details [facility] [address] Pulmonary Department  In 19 days 8/10/2021 @ 0730  with Dr. Daniele Perea at [address] [facility] [address]  [phone]        Total time in minutes spent coordinating this discharge (includes going over instructions, follow-up, prescriptions, and preparing report for sign off to her PCP) :35 minutes    Signed:  Anat Hare MD  [facility] Hospitalist  [phone]

## 2021-08-01 ENCOUNTER — APPOINTMENT (OUTPATIENT)
Dept: GENERAL RADIOLOGY | Age: 76
End: 2021-08-01
Attending: FAMILY MEDICINE
Payer: MEDICARE

## 2021-08-01 ENCOUNTER — HOSPITAL ENCOUNTER (EMERGENCY)
Age: 76
Discharge: HOME | End: 2021-08-02
Attending: FAMILY MEDICINE
Payer: MEDICARE

## 2021-08-01 DIAGNOSIS — J43.9 PULMONARY EMPHYSEMA, UNSPECIFIED EMPHYSEMA TYPE (HCC): Primary | ICD-10-CM

## 2021-08-01 PROCEDURE — 84484 ASSAY OF TROPONIN QUANT: CPT

## 2021-08-01 PROCEDURE — 85379 FIBRIN DEGRADATION QUANT: CPT

## 2021-08-01 PROCEDURE — 93005 ELECTROCARDIOGRAM TRACING: CPT

## 2021-08-01 PROCEDURE — 94664 DEMO&/EVAL PT USE INHALER: CPT

## 2021-08-01 PROCEDURE — 80053 COMPREHEN METABOLIC PANEL: CPT

## 2021-08-01 PROCEDURE — 71045 X-RAY EXAM CHEST 1 VIEW: CPT

## 2021-08-01 PROCEDURE — 99283 EMERGENCY DEPT VISIT LOW MDM: CPT

## 2021-08-01 PROCEDURE — 36415 COLL VENOUS BLD VENIPUNCTURE: CPT

## 2021-08-01 PROCEDURE — 74011000250 HC RX REV CODE- 250

## 2021-08-01 PROCEDURE — 85025 COMPLETE CBC W/AUTO DIFF WBC: CPT

## 2021-08-01 PROCEDURE — 94640 AIRWAY INHALATION TREATMENT: CPT

## 2021-08-01 PROCEDURE — 83605 ASSAY OF LACTIC ACID: CPT

## 2021-08-01 RX ORDER — DEXAMETHASONE SODIUM PHOSPHATE 10 MG/ML
10 INJECTION INTRAMUSCULAR; INTRAVENOUS
Status: DISCONTINUED | OUTPATIENT
Start: 2021-08-01 | End: 2021-08-02 | Stop reason: HOSPADM

## 2021-08-01 RX ORDER — IPRATROPIUM BROMIDE AND ALBUTEROL SULFATE 2.5; .5 MG/3ML; MG/3ML
SOLUTION RESPIRATORY (INHALATION)
Status: COMPLETED
Start: 2021-08-01 | End: 2021-08-01

## 2021-08-01 RX ORDER — IPRATROPIUM BROMIDE AND ALBUTEROL SULFATE 2.5; .5 MG/3ML; MG/3ML
3 SOLUTION RESPIRATORY (INHALATION)
Status: COMPLETED | OUTPATIENT
Start: 2021-08-01 | End: 2021-08-01

## 2021-08-01 RX ADMIN — IPRATROPIUM BROMIDE AND ALBUTEROL SULFATE 3 ML: .5; 3 SOLUTION RESPIRATORY (INHALATION) at 23:45

## 2021-08-01 RX ADMIN — IPRATROPIUM BROMIDE AND ALBUTEROL SULFATE 3 ML: 2.5; .5 SOLUTION RESPIRATORY (INHALATION) at 23:45

## 2021-08-02 VITALS
RESPIRATION RATE: 17 BRPM | TEMPERATURE: 98.3 F | BODY MASS INDEX: 22.08 KG/M2 | HEART RATE: 104 BPM | WEIGHT: 120 LBS | DIASTOLIC BLOOD PRESSURE: 75 MMHG | OXYGEN SATURATION: 97 % | HEIGHT: 62 IN | SYSTOLIC BLOOD PRESSURE: 140 MMHG

## 2021-08-02 LAB
ALBUMIN SERPL-MCNC: 3.3 G/DL (ref 3.5–5)
ALBUMIN/GLOB SERPL: 0.9 {RATIO} (ref 1.1–2.2)
ALP SERPL-CCNC: 105 U/L (ref 45–117)
ALT SERPL-CCNC: 55 U/L (ref 12–78)
ANION GAP SERPL CALC-SCNC: 14 MMOL/L (ref 5–15)
ARTERIAL PATENCY WRIST A: YES
AST SERPL-CCNC: 25 U/L (ref 15–37)
BASE DEFICIT BLDA-SCNC: 4.6 MMOL/L
BASOPHILS # BLD: 0.1 K/UL (ref 0–0.1)
BASOPHILS NFR BLD: 0 % (ref 0–1)
BDY SITE: ABNORMAL
BILIRUB SERPL-MCNC: 0.3 MG/DL (ref 0.2–1)
BUN SERPL-MCNC: 9 MG/DL (ref 6–20)
BUN/CREAT SERPL: 10 (ref 12–20)
CALCIUM SERPL-MCNC: 8.9 MG/DL (ref 8.5–10.1)
CHLORIDE SERPL-SCNC: 102 MMOL/L (ref 97–108)
CO2 SERPL-SCNC: 23 MMOL/L (ref 21–32)
CREAT SERPL-MCNC: 0.94 MG/DL (ref 0.55–1.02)
D DIMER PPP FEU-MCNC: 0.71 MG/L FEU (ref 0–0.65)
DIFFERENTIAL METHOD BLD: ABNORMAL
EOSINOPHIL # BLD: 0.1 K/UL (ref 0–0.4)
EOSINOPHIL NFR BLD: 1 % (ref 0–7)
ERYTHROCYTE [DISTWIDTH] IN BLOOD BY AUTOMATED COUNT: 13.3 % (ref 11.5–14.5)
GLOBULIN SER CALC-MCNC: 3.6 G/DL (ref 2–4)
GLUCOSE SERPL-MCNC: 105 MG/DL (ref 65–100)
HCO3 BLDA-SCNC: 19 MMOL/L (ref 22–26)
HCT VFR BLD AUTO: 39.3 % (ref 35–47)
HGB BLD-MCNC: 13 G/DL (ref 11.5–16)
IMM GRANULOCYTES # BLD AUTO: 0.1 K/UL (ref 0–0.04)
IMM GRANULOCYTES NFR BLD AUTO: 1 % (ref 0–0.5)
LACTATE SERPL-SCNC: 4.2 MMOL/L (ref 0.4–2)
LACTATE SERPL-SCNC: 4.2 MMOL/L (ref 0.4–2)
LYMPHOCYTES # BLD: 2.1 K/UL (ref 0.8–3.5)
LYMPHOCYTES NFR BLD: 16 % (ref 12–49)
MCH RBC QN AUTO: 33.5 PG (ref 26–34)
MCHC RBC AUTO-ENTMCNC: 33.1 G/DL (ref 30–36.5)
MCV RBC AUTO: 101.3 FL (ref 80–99)
MONOCYTES # BLD: 1 K/UL (ref 0–1)
MONOCYTES NFR BLD: 7 % (ref 5–13)
NEUTS SEG # BLD: 9.9 K/UL (ref 1.8–8)
NEUTS SEG NFR BLD: 75 % (ref 32–75)
NRBC # BLD: 0 K/UL (ref 0–0.01)
NRBC BLD-RTO: 0 PER 100 WBC
PCO2 BLDA: 33 MMHG (ref 35–45)
PH BLDA: 7.39 [PH] (ref 7.35–7.45)
PLATELET # BLD AUTO: 312 K/UL (ref 150–400)
PMV BLD AUTO: 9.4 FL (ref 8.9–12.9)
PO2 BLDA: 74 MMHG (ref 80–100)
POTASSIUM SERPL-SCNC: 3.8 MMOL/L (ref 3.5–5.1)
PROT SERPL-MCNC: 6.9 G/DL (ref 6.4–8.2)
RBC # BLD AUTO: 3.88 M/UL (ref 3.8–5.2)
SAO2 % BLD: 95 % (ref 92–97)
SAO2% DEVICE SAO2% SENSOR NAME: ABNORMAL
SERVICE CMNT-IMP: ABNORMAL
SODIUM SERPL-SCNC: 139 MMOL/L (ref 136–145)
SPECIMEN SITE: ABNORMAL
TROPONIN I SERPL-MCNC: <0.05 NG/ML
WBC # BLD AUTO: 13.2 K/UL (ref 3.6–11)

## 2021-08-02 PROCEDURE — 36415 COLL VENOUS BLD VENIPUNCTURE: CPT

## 2021-08-02 PROCEDURE — 82803 BLOOD GASES ANY COMBINATION: CPT

## 2021-08-02 PROCEDURE — 77030029684 HC NEB SM VOL KT MONA -A

## 2021-08-02 PROCEDURE — 83605 ASSAY OF LACTIC ACID: CPT

## 2021-08-02 PROCEDURE — 36600 WITHDRAWAL OF ARTERIAL BLOOD: CPT

## 2021-08-02 NOTE — PROGRESS NOTES
RT note: ABG on RA    Results for Kulwant Shearer (MRN [id number]) as of 8/2/2021 00:22   Ref.  Range 8/2/2021 00:03   pH Latest Ref Range: 7.35 - 7.45   7.39   PCO2 Latest Ref Range: 35 - 45 mmHg 33 (L)   PO2 Latest Ref Range: 80 - 100 mmHg 74 (L)   BICARBONATE Latest Ref Range: 22 - 26 mmol/L 19 (L)   O2 SAT Latest Ref Range: 92 - 97 % 95   BASE DEFICIT Latest Units: mmol/L 4.6   Sample source Latest Units:   ARTERIAL   SITE Latest Units:   RIGHT RADIAL   RAMYA'S TEST Latest Units:   YES   O2 METHOD Latest Units:   ROOM AIR      placed pt on NC 2 lpm PPC, order

## 2021-08-02 NOTE — ED TRIAGE NOTES
Chronic COPD, patient states that she has diagnosed with bronchitis and she doesn't feel she is getting better.  Patient ambulatory at time of arrival.

## 2021-08-02 NOTE — ED PROVIDER NOTES
EMERGENCY DEPARTMENT HISTORY AND PHYSICAL EXAM          Date: 8/1/2021  Patient Name: Antonette López    History of Presenting Illness     Chief Complaint   Patient presents with    Shortness of Breath       History Provided By: Patient    HPI: Antonette López is a 67 y.o. female, pmhx COPD, who presents to the ED c/o shortness of breath. She was seen here a week ago and diagnosed with bronchitis and admitted to the medical floor. After 4 days at this hospital, she was transferred to [facility] in [place]. There, she saw a pulmonologist, and she will follow up with a pulmonologist at [facility] in 2 weeks. For the last several days, she has felt like she has not been getting enough air. . She has had 4 nebs at home, that have not helped. She has had a dry cough. Finished a 4 day taper of prednisone 2 days ago, but it did not seem to help. Patient specifically denies any recent fevers, chills, nausea, vomiting, diarrhea, abd pain, CP, urinary sxs, changes in BM, or headache. PCP: Jaxon Wilks MD    Allergies: Roflumilast, Levofloxacin,Maxzide,PCN. Social Hx: Denies tobacco, 1 glass of wine per night; Lives locally with her . There are no other complaints, changes, or physical findings at this time. Current Facility-Administered Medications   Medication Dose Route Frequency Provider Last Rate Last Admin    dexamethasone (PF) (DECADRON) 10 mg/mL injection 10 mg  10 mg IntraVENous NOW Nikki Orlando MD         Current Outpatient Medications   Medication Sig Dispense Refill    guaiFENesin ER (Mucinex) 600 mg ER tablet Take 600 mg by mouth.  losartan (COZAAR) 50 mg tablet Take 50 mg by mouth daily. 2 tabs per day      albuterol (PROVENTIL HFA, VENTOLIN HFA, PROAIR HFA) 90 mcg/actuation inhaler Take 2 Puffs by inhalation every four (4) hours as needed.       dilTIAZem ER (CARDIZEM CD) 180 mg capsule  (Patient not taking: Reported on 7/18/2021)      fluticasone-umeclidin-vilanter (Trelegy Ellipta) 200-62.5-25 mcg dsdv Take 1 Puff by inhalation daily.  esomeprazole (NEXIUM) 20 mg capsule Take 20 mg by mouth.  Daliresp 500 mcg tab tablet TAKE 1/2 TABLET BY MOUTH DAILY FOR 30 DAYS,THEN TAKE 1 TAB DAILY (Patient not taking: Reported on 7/18/2021)      albuterol-ipratropium (DUO-NEB) 2.5 mg-0.5 mg/3 ml nebu 3 mL by Nebulization route every four (4) hours as needed for Wheezing. 30 Nebule 1    azithromycin (Zithromax Z-Tavares) 250 mg tablet 2 tabs today and then 1 tab daily until completed (Patient not taking: Reported on 7/18/2021) 6 Tab 0    levoFLOXacin (LEVAQUIN) 750 mg tablet Take 1 Tab by mouth daily. Indications: pneumonia (Patient not taking: Reported on 7/18/2021) 5 Tab 0    sodium chloride 3 % nebulizer solution TAKE 4 ML BY NEBULIZATION DAILY      potassium chloride SR (K-TAB) 20 mEq tablet TAKE 1 TABLET BY MOUTH ONCE DAILY FOR LOW POTASSIUM      Tobradex ophthalmic ointment APPLY TO EACH EYE AT BEDTIME AS NEEDED      Oxygen 1 Each by Nasal route daily as needed. Uses one liter of oxygen as needed      amLODIPine (NORVASC) 5 mg tablet Indications: High Blood Pressure Disorder 1 pill daily for blood pressure (Patient not taking: Reported on 7/18/2021)      valACYclovir (VALTREX) 500 mg tablet 1 pill twice daily for 5 days if needed for outbreak      montelukast (SINGULAIR) 10 mg tablet Take 10 mg by mouth At bedtime.  atorvastatin (LIPITOR) 10 mg tablet Take 20 mg by mouth daily.  multivitamin (MULTIPLE VITAMINS PO) Take  by mouth.  loratadine (CLARITIN) 10 mg tablet Take 1 Tab by mouth daily. Indications: ALLERGIC RHINITIS (Patient not taking: Reported on 7/18/2021) 30 Tab 4    fluticasone (FLONASE) 50 mcg/actuation nasal spray One spray per nostril twice a day  Indications: ALLERGIC RHINITIS 1 Bottle 5    naproxen sodium (ALEVE) 220 mg cap Take  by mouth.          Past History     Past Medical History:  Past Medical History:   Diagnosis Date    Chronic obstructive pulmonary disease (HCC)     Environmental and seasonal allergies     GERD (gastroesophageal reflux disease)     Herpes     Genital    Hx of colonoscopy 2017    Hypertension     OA (osteoarthritis)        Past Surgical History:  Past Surgical History:   Procedure Laterality Date    HX BREAST AUGMENTATION      HX CATARACT REMOVAL Bilateral     HX COLONOSCOPY      [facility]    HX COLONOSCOPY  2017    nl    HX ORTHOPAEDIC Left 10/14/2016    left foot planter fibroma    HX ORTHOPAEDIC      right hand fibroma    HX TUBAL LIGATION      IMPLANT BREAST SILICONE/EQ         Family History:  Family History   Problem Relation Age of Onset    Heart Disease Father     Hypertension Mother     Liver Disease Brother         Hepatitis C    Heart Failure Brother        Social History:  Social History     Tobacco Use    Smoking status: Former Smoker     Quit date: 1995     Years since quittin.1    Smokeless tobacco: Never Used   Substance Use Topics    Alcohol use: Yes     Alcohol/week: 63.0 standard drinks     Types: 21 Glasses of wine, 42 Shots of liquor per week     Comment: 2 drinks per night    Drug use: No       Allergies: Allergies   Allergen Reactions    Daliresp [Roflumilast] Diarrhea    Levofloxacin Diarrhea     Diarrhea and stomach cramps    Maxzide [Triamterene-Hydrochlorothiazid] Rash    Pcn [Penicillins] Unknown (comments)         Review of Systems   Review of Systems   Constitutional: Positive for appetite change. Negative for fever. HENT: Negative for congestion, ear discharge, sore throat and trouble swallowing. Respiratory: Positive for shortness of breath. Negative for cough and chest tightness. Cardiovascular: Negative for chest pain and leg swelling. Gastrointestinal: Negative for abdominal pain, constipation and diarrhea. Genitourinary: Negative for dysuria and frequency.    Musculoskeletal: Negative for back pain, joint swelling and myalgias. Skin: Negative for rash. Allergic/Immunologic: Negative for environmental allergies and food allergies. Neurological: Positive for headaches. Negative for dizziness, seizures, weakness and light-headedness. Hematological: Negative for adenopathy. Physical Exam   Physical Exam  Vitals reviewed. Constitutional:       General: She is not in acute distress. Appearance: She is well-developed. She is not diaphoretic. Comments: Appears depressed. Moaning when she breathes. HENT:      Head: Normocephalic and atraumatic. Right Ear: External ear normal.      Left Ear: External ear normal.      Nose: Nose normal.   Eyes:      General: No scleral icterus. Right eye: No discharge. Left eye: No discharge. Conjunctiva/sclera: Conjunctivae normal.      Pupils: Pupils are equal, round, and reactive to light. Neck:      Thyroid: No thyromegaly. Trachea: No tracheal deviation. Cardiovascular:      Rate and Rhythm: Regular rhythm. Tachycardia present. Heart sounds: Normal heart sounds. No murmur heard. No friction rub. No gallop. Pulmonary:      Effort: No respiratory distress. Breath sounds: Examination of the right-upper field reveals decreased breath sounds. Examination of the left-upper field reveals decreased breath sounds. Examination of the right-middle field reveals decreased breath sounds. Examination of the left-middle field reveals decreased breath sounds. Examination of the right-lower field reveals decreased breath sounds. Examination of the left-lower field reveals decreased breath sounds. Decreased breath sounds present. No wheezing or rales. Comments: Mildly diminished breath sounds throughout. Chest:      Chest wall: No tenderness. Abdominal:      General: Bowel sounds are normal. There is no distension. Palpations: Abdomen is soft. Tenderness:  There is no abdominal tenderness. There is no guarding or rebound. Musculoskeletal:         General: No tenderness or deformity. Normal range of motion. Cervical back: Normal range of motion and neck supple. Skin:     General: Skin is warm and dry. Neurological:      Mental Status: She is alert and oriented to person, place, and time. Cranial Nerves: No cranial nerve deficit. Coordination: Coordination normal.      Deep Tendon Reflexes: Reflexes are normal and symmetric. Reflexes normal.         Diagnostic Study Results     Labs -     Recent Results (from the past 12 hour(s))   CBC WITH AUTOMATED DIFF    Collection Time: 08/01/21 11:38 PM   Result Value Ref Range    WBC 13.2 (H) 3.6 - 11.0 K/uL    RBC 3.88 3.80 - 5.20 M/uL    HGB 13.0 11.5 - 16.0 g/dL    HCT 39.3 35.0 - 47.0 %    .3 (H) 80.0 - 99.0 FL    MCH 33.5 26.0 - 34.0 PG    MCHC 33.1 30.0 - 36.5 g/dL    RDW 13.3 11.5 - 14.5 %    PLATELET 458 990 - 761 K/uL    MPV 9.4 8.9 - 12.9 FL    NRBC 0.0 0  WBC    ABSOLUTE NRBC 0.00 0.00 - 0.01 K/uL    NEUTROPHILS 75 32 - 75 %    LYMPHOCYTES 16 12 - 49 %    MONOCYTES 7 5 - 13 %    EOSINOPHILS 1 0 - 7 %    BASOPHILS 0 0 - 1 %    IMMATURE GRANULOCYTES 1 (H) 0.0 - 0.5 %    ABS. NEUTROPHILS 9.9 (H) 1.8 - 8.0 K/UL    ABS. LYMPHOCYTES 2.1 0.8 - 3.5 K/UL    ABS. MONOCYTES 1.0 0.0 - 1.0 K/UL    ABS. EOSINOPHILS 0.1 0.0 - 0.4 K/UL    ABS. BASOPHILS 0.1 0.0 - 0.1 K/UL    ABS. IMM.  GRANS. 0.1 (H) 0.00 - 0.04 K/UL    DF AUTOMATED     D DIMER    Collection Time: 08/01/21 11:38 PM   Result Value Ref Range    D-dimer 0.71 (H) 0.00 - 0.65 mg/L FEU   METABOLIC PANEL, COMPREHENSIVE    Collection Time: 08/01/21 11:38 PM   Result Value Ref Range    Sodium 139 136 - 145 mmol/L    Potassium 3.8 3.5 - 5.1 mmol/L    Chloride 102 97 - 108 mmol/L    CO2 23 21 - 32 mmol/L    Anion gap 14 5 - 15 mmol/L    Glucose 105 (H) 65 - 100 mg/dL    BUN 9 6 - 20 MG/DL    Creatinine 0.94 0.55 - 1.02 MG/DL    BUN/Creatinine ratio 10 (L) 12 - 20 GFR est AA >60 >60 ml/min/1.73m2    GFR est non-AA 58 (L) >60 ml/min/1.73m2    Calcium 8.9 8.5 - 10.1 MG/DL    Bilirubin, total 0.3 0.2 - 1.0 MG/DL    ALT (SGPT) 55 12 - 78 U/L    AST (SGOT) 25 15 - 37 U/L    Alk. phosphatase 105 45 - 117 U/L    Protein, total 6.9 6.4 - 8.2 g/dL    Albumin 3.3 (L) 3.5 - 5.0 g/dL    Globulin 3.6 2.0 - 4.0 g/dL    A-G Ratio 0.9 (L) 1.1 - 2.2     TROPONIN I    Collection Time: 08/01/21 11:38 PM   Result Value Ref Range    Troponin-I, Qt. <0.05 <0.05 ng/mL   LACTIC ACID    Collection Time: 08/01/21 11:38 PM   Result Value Ref Range    Lactic acid 4.2 (HH) 0.4 - 2.0 MMOL/L   EKG, 12 LEAD, INITIAL    Collection Time: 08/01/21 11:47 PM   Result Value Ref Range    Ventricular Rate 97 BPM    Atrial Rate 97 BPM    P-R Interval 142 ms    QRS Duration 86 ms    Q-T Interval 356 ms    QTC Calculation (Bezet) 452 ms    Calculated P Axis 41 degrees    Calculated R Axis -15 degrees    Calculated T Axis 44 degrees    Diagnosis       Normal sinus rhythm  Normal ECG  When compared with ECG of 18-JUL-2021 10:37,  No significant change was found     BLOOD GAS, ARTERIAL    Collection Time: 08/02/21 12:03 AM   Result Value Ref Range    pH 7.39 7.35 - 7.45      PCO2 33 (L) 35 - 45 mmHg    PO2 74 (L) 80 - 100 mmHg    O2 SAT 95 92 - 97 %    BICARBONATE 19 (L) 22 - 26 mmol/L    BASE DEFICIT 4.6 mmol/L    O2 METHOD ROOM AIR      Sample source ARTERIAL      SITE RIGHT RADIAL      RAMYA'S TEST YES      Critical value read back Called to Cite MD Marizol on 08/02/2021 at 00:06        Radiologic Studies -   XR CHEST PORT   Final Result   No acute findings. CXR Results  (Last 48 hours)               08/01/21 2348  XR CHEST PORT Final result    Impression:  No acute findings. Narrative:  EXAM: XR CHEST PORT       INDICATION: cough, SOB       COMPARISON: Chest x-ray 7/20/2021. FINDINGS: A portable AP radiograph of the chest was obtained at 23:26 hours. The   lungs are clear.  The cardiac and mediastinal contours and pulmonary vascularity   are normal.  The bones and soft tissues show bilateral breast prostheses with   calcific capsulitis but otherwise are grossly within normal limits. Medical Decision Making   I am the first provider for this patient. I reviewed the vital signs, available nursing notes, past medical history, past surgical history, family history and social history. Vital Signs-Reviewed the patient's vital signs. Patient Vitals for the past 12 hrs:   Temp Pulse Resp BP SpO2   08/01/21 2346 -- -- -- -- (P) 97 %   08/01/21 2255 98.3 °F (36.8 °C) (!) 104 17 (!) 140/75 97 %       Pulse Oximetry Analysis - 97% on RA    Cardiac Monitor:   Rate: 100 bpm  Rhythm: Sinus Tachycardia      Records Reviewed: Nursing Notes, Old Medical Records, Previous electrocardiograms and Previous Laboratory Studies    Provider Notes (Medical Decision Making):   MDM: This patient appears to have had an extensive work-up over the last 2 weeks, and it appears that there is no explanation for her fatigue and shortness of breath. She has just finished a prednisone taper, but it may be worth trying a dose of dexamethasone. With her normal arterial but blood gas, normal labs, and a D-dimer that is within an age-adjusted normal, it is unlikely that there is much we can do if she is admitted here. ED Course:   Initial assessment performed. The patients presenting problems have been discussed, and they are in agreement with the care plan formulated and outlined with them. I have encouraged them to ask questions as they arise throughout their visit. EKG interpretation: (Preliminary)  Rhythm: Normal sinus rhythm, heart rate 97, no ST changes. This EKG was interpreted by ED Provider Dr Veronica Ace MD    PROGRESS NOTE:  Patient's labs and x-rays and EKG all looked normal except for a lactate of 4.1.   It is known that albuterol can elevate the lactate, but a liter of saline was given with the off chance that this was mild dehydration. The lactate was unchanged after the liter. Patient actually felt better after DuoNeb, 10 mg of dexamethasone, and 1000 mL of normal saline. She felt comfortable going home and was discharged. ED Course as of Aug 02 0938   Mon Aug 02, 2021   0020 Lactate 4.2. Will give 1000 ml IV and then recheck. [VG]   1355 Discussing results with patient and . ABG normal. Ddimer within     [VG]      ED Course User Index  [VG] Ramona Lara MD        Discharge note:    Pt re-evaluated and noted to be feeling better, ready for discharge. Updated pt and  on all final lab and Xray findings. Will follow up as instructed. All questions have been answered, pt voiced understanding and agreement with plan. Specific return precautions provided as well as instructions to return to the ED should sx worsen at any time. Vital signs stable for discharge. Critical Care Time:   0      Diagnosis     Clinical Impression: No diagnosis found. Emphysema      Lactic acidosis    PLAN:  1. Follow up pcp  Current Discharge Medication List        Return to ED if worse     Disposition:  Home      Please note, this dictation was completed with [employer], the [employer] voice recognition software. Quite often unanticipated grammatical, syntax, homophones, and other interpretive errors are inadvertently transcribed by the computer software. Please disregard these errors. Please excuse any errors that have escaped final proof reading.

## 2021-08-03 PROBLEM — M19.90 OA (OSTEOARTHRITIS): Status: RESOLVED | Noted: 2021-08-03 | Resolved: 2021-08-03

## 2021-08-08 LAB
ATRIAL RATE: 97 BPM
CALCULATED P AXIS, ECG09: 41 DEGREES
CALCULATED R AXIS, ECG10: -15 DEGREES
CALCULATED T AXIS, ECG11: 44 DEGREES
DIAGNOSIS, 93000: NORMAL
P-R INTERVAL, ECG05: 142 MS
Q-T INTERVAL, ECG07: 356 MS
QRS DURATION, ECG06: 86 MS
QTC CALCULATION (BEZET), ECG08: 452 MS
VENTRICULAR RATE, ECG03: 97 BPM

## 2021-11-06 ENCOUNTER — APPOINTMENT (OUTPATIENT)
Dept: GENERAL RADIOLOGY | Age: 76
End: 2021-11-06
Attending: EMERGENCY MEDICINE
Payer: MEDICARE

## 2021-11-06 ENCOUNTER — HOSPITAL ENCOUNTER (EMERGENCY)
Age: 76
Discharge: HOME | End: 2021-11-06
Attending: EMERGENCY MEDICINE
Payer: MEDICARE

## 2021-11-06 VITALS
SYSTOLIC BLOOD PRESSURE: 157 MMHG | RESPIRATION RATE: 18 BRPM | WEIGHT: 125 LBS | HEART RATE: 85 BPM | DIASTOLIC BLOOD PRESSURE: 68 MMHG | HEIGHT: 62 IN | BODY MASS INDEX: 23 KG/M2 | OXYGEN SATURATION: 100 % | TEMPERATURE: 98.1 F

## 2021-11-06 DIAGNOSIS — M79.642 LEFT HAND PAIN: Primary | ICD-10-CM

## 2021-11-06 PROCEDURE — 77030008368 HC SPLNT ORTHGLS BSNM -B

## 2021-11-06 PROCEDURE — 73130 X-RAY EXAM OF HAND: CPT

## 2021-11-06 PROCEDURE — 99283 EMERGENCY DEPT VISIT LOW MDM: CPT

## 2021-11-06 PROCEDURE — 75810000053 HC SPLINT APPLICATION

## 2021-11-06 NOTE — ED TRIAGE NOTES
Pt noticed swelling, sharp pain in knuckles of left hand, upper. Pt has most pain in middle finger of left hand. 10/10 pain with movement, 2/10 at rest, describes as sharp. Full ROM of left hand and fingers, no numbness or tingling. Had to have ring cut off of hand day prior due to swelling.

## 2021-11-06 NOTE — ED PROVIDER NOTES
[address] [facility]  EMERGENCY DEPARTMENT HISTORY AND PHYSICAL EXAM         Date of Service: 11/6/2021   Patient Name: Jamil Klein   YOB: 1945  Medical Record Number: [id number]    History of Presenting Illness     Chief Complaint   Patient presents with    Hand Pain        History Provided By:  patient    Additional History:   Jamil Klein is a 67 y.o. female who presents ambulatory to the ED with cc of pain and swelling of the left middle and ring finger knuckles that began yesterday and was worse today. She is unable to flex the affected fingers due to pain. Denies any trauma, skin injury. She notes she had a similar presentation several years ago on the right hand, was treated by Ortho non surgically with casting. Denies F/C, H/O RA. There are no other complaints, changes or physical findings at this time.     Primary Care Provider: Opal Mejía MD   Specialist:    Past History     Past Medical History:   Past Medical History:   Diagnosis Date    Chronic obstructive pulmonary disease ([address]     Environmental and seasonal allergies     GERD (gastroesophageal reflux disease)     Herpes     Genital    Hx of colonoscopy 05/22/2017    Hypertension     OA (osteoarthritis)         Past Surgical History:   Past Surgical History:   Procedure Laterality Date    HX BREAST AUGMENTATION      HX CATARACT REMOVAL Bilateral     HX COLONOSCOPY  2005    [facility]    HX COLONOSCOPY  05/22/2017    nl    HX ORTHOPAEDIC Left 10/14/2016    left foot planter fibroma    HX ORTHOPAEDIC      right hand fibroma    HX TUBAL LIGATION      IMPLANT BREAST SILICONE/EQ          Family History:   Family History   Problem Relation Age of Onset    Heart Disease Father     Hypertension Mother     Liver Disease Brother         Hepatitis C    Heart Failure Brother         Social History:   Social History     Tobacco Use    Smoking status: Former Smoker     Quit date: 1995     Years since quittin.4    Smokeless tobacco: Never Used   Substance Use Topics    Alcohol use: Yes     Alcohol/week: 63.0 standard drinks     Types: 21 Glasses of wine, 42 Shots of liquor per week     Comment: 2 drinks per night    Drug use: No        Allergies: Allergies   Allergen Reactions    Daliresp [Roflumilast] Diarrhea    Levofloxacin Diarrhea     Diarrhea and stomach cramps    Maxzide [Triamterene-Hydrochlorothiazid] Rash    Pcn [Penicillins] Unknown (comments)        Review of Systems   Review of Systems   Constitutional: Negative for appetite change, chills and fever. HENT: Negative for congestion. Eyes: Negative for visual disturbance. Respiratory: Negative for cough, shortness of breath and wheezing. Cardiovascular: Negative for chest pain, palpitations and leg swelling. Gastrointestinal: Negative for abdominal pain. Genitourinary: Negative for dysuria, frequency and urgency. Musculoskeletal: Positive for arthralgias. Negative for back pain, joint swelling, myalgias and neck stiffness. Skin: Negative for rash. Neurological: Negative for dizziness, syncope, weakness and headaches. Hematological: Negative for adenopathy. Psychiatric/Behavioral: Negative for behavioral problems and dysphoric mood. Physical Exam  Physical Exam  Vitals and nursing note reviewed. Constitutional:       General: She is not in acute distress. Appearance: She is well-developed. HENT:      Head: Normocephalic and atraumatic. Eyes:      General: No scleral icterus. Conjunctiva/sclera: Conjunctivae normal.      Pupils: Pupils are equal, round, and reactive to light. Cardiovascular:      Rate and Rhythm: Normal rate and regular rhythm. Heart sounds: No murmur heard. No gallop. Pulmonary:      Effort: Pulmonary effort is normal. No respiratory distress. Breath sounds: No stridor. No wheezing or rales.    Abdominal:      General: Bowel sounds are normal. There is no distension. Palpations: Abdomen is soft. There is no mass. Tenderness: There is no abdominal tenderness. There is no guarding or rebound. Musculoskeletal:         General: Swelling and tenderness present. Cervical back: Normal range of motion and neck supple. Comments: Left hand: TTP and swollen 3rd and 4th MCP joints, with decreased ROM to flexion. Sensation and cap refill normal. No erythema. Lymphadenopathy:      Cervical: No cervical adenopathy. Skin:     General: Skin is warm and dry. Findings: No erythema or rash. Neurological:      Mental Status: She is alert and oriented to person, place, and time. Cranial Nerves: No cranial nerve deficit. Coordination: Coordination normal.         Medical Decision Making   I am the first provider for this patient. I reviewed the vital signs, available nursing notes, past medical history, past surgical history, family history and social history. Old Medical Records: Previous ED and PCP notes     Provider Notes:   DDX: Tendonitis, OA, fracture     ED Course:  11:18 AM   Initial assessment performed. The patients presenting problems have been discussed, and they are in agreement with the care plan formulated and outlined with them. I have encouraged them to ask questions as they arise throughout their visit. Progress Notes:  11:36 AM  Unremarkable imaging, with no significant soft tissue findings. Will place in a volar splint to immobilize MCPs, F/U Ortho if sx persist.  Lucy Gordon MD      Procedures:   Procedures    Diagnostic Study Results   Labs -    No results found for this or any previous visit (from the past 12 hour(s)). Radiologic Studies -  The following have been ordered and reviewed:  XR HAND LT MIN 3 V   Final Result   1. No acute abnormality.         CT Results  (Last 48 hours)    None        CXR Results  (Last 48 hours)    None            Vital Signs-Reviewed the patient's vital signs. Patient Vitals for the past 12 hrs:   Temp Pulse Resp BP SpO2   11/06/21 1111 98.1 °F (36.7 °C) 85 18 (!) 157/68 100 %       Medications Given in the ED:  Medications - No data to display    Diagnosis:  Clinical Impression:   1. Left hand pain         Plan:  1:   Follow-up Information     Follow up With Specialties Details Why Contact Info    Skylar Partida MD Family Medicine  If symptoms worsen [address]  [phone]            2:   Current Discharge Medication List        Return to ED if worse. Disposition:  Home  _______________________________   Attestations: This note was performed by Annamaria Sam MD in its entirety.   _______________________________

## 2022-03-18 PROBLEM — R09.02 HYPOXEMIA: Status: ACTIVE | Noted: 2020-05-29

## 2022-03-18 PROBLEM — J44.1 COPD EXACERBATION (HCC): Status: ACTIVE | Noted: 2020-05-29

## 2022-03-18 PROBLEM — F10.20 UNCOMPLICATED ALCOHOL DEPENDENCE (HCC): Status: ACTIVE | Noted: 2020-05-29

## 2022-03-18 PROBLEM — J18.9 BILATERAL PNEUMONIA: Status: ACTIVE | Noted: 2021-07-18

## 2022-03-19 PROBLEM — J96.01 ACUTE RESPIRATORY FAILURE WITH HYPOXIA (HCC): Status: ACTIVE | Noted: 2020-05-29

## 2022-03-19 PROBLEM — J18.9 CAP (COMMUNITY ACQUIRED PNEUMONIA): Status: ACTIVE | Noted: 2020-12-06

## 2022-03-20 PROBLEM — J18.9 COMMUNITY ACQUIRED PNEUMONIA OF RIGHT UPPER LOBE OF LUNG: Status: ACTIVE | Noted: 2020-05-29

## 2022-03-20 PROBLEM — I10 ESSENTIAL HYPERTENSION: Status: ACTIVE | Noted: 2020-12-06

## 2022-04-01 ENCOUNTER — HOSPITAL ENCOUNTER (EMERGENCY)
Age: 77
Discharge: HOME | End: 2022-04-02
Attending: FAMILY MEDICINE
Payer: MEDICARE

## 2022-04-01 ENCOUNTER — APPOINTMENT (OUTPATIENT)
Dept: GENERAL RADIOLOGY | Age: 77
End: 2022-04-01
Attending: FAMILY MEDICINE
Payer: MEDICARE

## 2022-04-01 DIAGNOSIS — J44.1 ACUTE EXACERBATION OF CHRONIC OBSTRUCTIVE PULMONARY DISEASE (COPD) (HCC): Primary | ICD-10-CM

## 2022-04-01 PROCEDURE — 99285 EMERGENCY DEPT VISIT HI MDM: CPT

## 2022-04-01 PROCEDURE — 80053 COMPREHEN METABOLIC PANEL: CPT

## 2022-04-01 PROCEDURE — 94640 AIRWAY INHALATION TREATMENT: CPT

## 2022-04-01 PROCEDURE — 74011250636 HC RX REV CODE- 250/636: Performed by: FAMILY MEDICINE

## 2022-04-01 PROCEDURE — 82077 ASSAY SPEC XCP UR&BREATH IA: CPT

## 2022-04-01 PROCEDURE — 36415 COLL VENOUS BLD VENIPUNCTURE: CPT

## 2022-04-01 PROCEDURE — 96374 THER/PROPH/DIAG INJ IV PUSH: CPT

## 2022-04-01 PROCEDURE — 93005 ELECTROCARDIOGRAM TRACING: CPT

## 2022-04-01 PROCEDURE — 74011000250 HC RX REV CODE- 250: Performed by: FAMILY MEDICINE

## 2022-04-01 PROCEDURE — 96375 TX/PRO/DX INJ NEW DRUG ADDON: CPT

## 2022-04-01 PROCEDURE — 84484 ASSAY OF TROPONIN QUANT: CPT

## 2022-04-01 PROCEDURE — 71045 X-RAY EXAM CHEST 1 VIEW: CPT

## 2022-04-01 PROCEDURE — 85025 COMPLETE CBC W/AUTO DIFF WBC: CPT

## 2022-04-01 RX ORDER — IPRATROPIUM BROMIDE AND ALBUTEROL SULFATE 2.5; .5 MG/3ML; MG/3ML
3 SOLUTION RESPIRATORY (INHALATION)
Status: COMPLETED | OUTPATIENT
Start: 2022-04-01 | End: 2022-04-01

## 2022-04-01 RX ADMIN — IPRATROPIUM BROMIDE AND ALBUTEROL SULFATE 3 ML: .5; 3 SOLUTION RESPIRATORY (INHALATION) at 23:43

## 2022-04-01 RX ADMIN — METHYLPREDNISOLONE SODIUM SUCCINATE 125 MG: 125 INJECTION, POWDER, FOR SOLUTION INTRAMUSCULAR; INTRAVENOUS at 23:44

## 2022-04-02 ENCOUNTER — APPOINTMENT (OUTPATIENT)
Dept: CT IMAGING | Age: 77
End: 2022-04-02
Attending: FAMILY MEDICINE
Payer: MEDICARE

## 2022-04-02 VITALS
TEMPERATURE: 98.6 F | DIASTOLIC BLOOD PRESSURE: 69 MMHG | BODY MASS INDEX: 23 KG/M2 | HEIGHT: 62 IN | WEIGHT: 125 LBS | RESPIRATION RATE: 27 BRPM | OXYGEN SATURATION: 94 % | HEART RATE: 108 BPM | SYSTOLIC BLOOD PRESSURE: 132 MMHG

## 2022-04-02 LAB
ALBUMIN SERPL-MCNC: 3.8 G/DL (ref 3.5–5)
ALBUMIN/GLOB SERPL: 1.2 {RATIO} (ref 1.1–2.2)
ALP SERPL-CCNC: 80 U/L (ref 45–117)
ALT SERPL-CCNC: 46 U/L (ref 12–78)
ANION GAP SERPL CALC-SCNC: 16 MMOL/L (ref 5–15)
ARTERIAL PATENCY WRIST A: YES
AST SERPL-CCNC: 32 U/L (ref 15–37)
BASE DEFICIT BLDA-SCNC: 8.2 MMOL/L
BASOPHILS # BLD: 0 K/UL (ref 0–0.1)
BASOPHILS NFR BLD: 0 % (ref 0–1)
BDY SITE: ABNORMAL
BILIRUB SERPL-MCNC: 0.5 MG/DL (ref 0.2–1)
BUN SERPL-MCNC: 15 MG/DL (ref 6–20)
BUN/CREAT SERPL: 13 (ref 12–20)
CALCIUM SERPL-MCNC: 8.7 MG/DL (ref 8.5–10.1)
CHLORIDE SERPL-SCNC: 100 MMOL/L (ref 97–108)
CO2 SERPL-SCNC: 20 MMOL/L (ref 21–32)
CREAT SERPL-MCNC: 1.18 MG/DL (ref 0.55–1.02)
DIFFERENTIAL METHOD BLD: ABNORMAL
EOSINOPHIL # BLD: 0 K/UL (ref 0–0.4)
EOSINOPHIL NFR BLD: 0 % (ref 0–7)
ERYTHROCYTE [DISTWIDTH] IN BLOOD BY AUTOMATED COUNT: 12.9 % (ref 11.5–14.5)
ETHANOL SERPL-MCNC: 117 MG/DL
GLOBULIN SER CALC-MCNC: 3.2 G/DL (ref 2–4)
GLUCOSE SERPL-MCNC: 161 MG/DL (ref 65–100)
HCO3 BLDA-SCNC: 16 MMOL/L (ref 22–26)
HCT VFR BLD AUTO: 36.9 % (ref 35–47)
HGB BLD-MCNC: 12.1 G/DL (ref 11.5–16)
IMM GRANULOCYTES # BLD AUTO: 0.1 K/UL (ref 0–0.04)
IMM GRANULOCYTES NFR BLD AUTO: 1 % (ref 0–0.5)
LYMPHOCYTES # BLD: 0.4 K/UL (ref 0.8–3.5)
LYMPHOCYTES NFR BLD: 4 % (ref 12–49)
MCH RBC QN AUTO: 34.7 PG (ref 26–34)
MCHC RBC AUTO-ENTMCNC: 32.8 G/DL (ref 30–36.5)
MCV RBC AUTO: 105.7 FL (ref 80–99)
MONOCYTES # BLD: 0.2 K/UL (ref 0–1)
MONOCYTES NFR BLD: 2 % (ref 5–13)
NEUTS SEG # BLD: 8.8 K/UL (ref 1.8–8)
NEUTS SEG NFR BLD: 93 % (ref 32–75)
NRBC # BLD: 0 K/UL (ref 0–0.01)
NRBC BLD-RTO: 0 PER 100 WBC
PCO2 BLDA: 29 MMHG (ref 35–45)
PH BLDA: 7.36 [PH] (ref 7.35–7.45)
PLATELET # BLD AUTO: 220 K/UL (ref 150–400)
PLATELET COMMENTS,PCOM: ABNORMAL
PMV BLD AUTO: 8.8 FL (ref 8.9–12.9)
PO2 BLDA: 81 MMHG (ref 80–100)
POTASSIUM SERPL-SCNC: 3.9 MMOL/L (ref 3.5–5.1)
PROT SERPL-MCNC: 7 G/DL (ref 6.4–8.2)
RBC # BLD AUTO: 3.49 M/UL (ref 3.8–5.2)
RBC MORPH BLD: ABNORMAL
SAO2 % BLD: 96 % (ref 92–97)
SAO2% DEVICE SAO2% SENSOR NAME: ABNORMAL
SODIUM SERPL-SCNC: 136 MMOL/L (ref 136–145)
SPECIMEN SITE: ABNORMAL
TROPONIN-HIGH SENSITIVITY: 7 NG/L (ref 0–51)
WBC # BLD AUTO: 9.5 K/UL (ref 3.6–11)

## 2022-04-02 PROCEDURE — 70491 CT SOFT TISSUE NECK W/DYE: CPT

## 2022-04-02 PROCEDURE — 74011000250 HC RX REV CODE- 250: Performed by: FAMILY MEDICINE

## 2022-04-02 PROCEDURE — 82803 BLOOD GASES ANY COMBINATION: CPT

## 2022-04-02 PROCEDURE — 77030029684 HC NEB SM VOL KT MONA -A

## 2022-04-02 PROCEDURE — 71275 CT ANGIOGRAPHY CHEST: CPT

## 2022-04-02 PROCEDURE — 74011250637 HC RX REV CODE- 250/637: Performed by: FAMILY MEDICINE

## 2022-04-02 PROCEDURE — 94640 AIRWAY INHALATION TREATMENT: CPT

## 2022-04-02 PROCEDURE — 77010033678 HC OXYGEN DAILY

## 2022-04-02 PROCEDURE — 74011250636 HC RX REV CODE- 250/636: Performed by: FAMILY MEDICINE

## 2022-04-02 PROCEDURE — 36600 WITHDRAWAL OF ARTERIAL BLOOD: CPT

## 2022-04-02 PROCEDURE — 74011000636 HC RX REV CODE- 636: Performed by: FAMILY MEDICINE

## 2022-04-02 RX ORDER — HYDROCODONE POLISTIREX AND CHLORPHENIRAMINE POLISTIREX 10; 8 MG/5ML; MG/5ML
5 SUSPENSION, EXTENDED RELEASE ORAL
Status: COMPLETED | OUTPATIENT
Start: 2022-04-02 | End: 2022-04-02

## 2022-04-02 RX ORDER — LORAZEPAM 0.5 MG/1
0.5 TABLET ORAL
Qty: 8 TABLET | Refills: 0 | Status: SHIPPED | OUTPATIENT
Start: 2022-04-02 | End: 2022-05-27

## 2022-04-02 RX ORDER — LORAZEPAM 2 MG/ML
0.5 INJECTION INTRAMUSCULAR
Status: COMPLETED | OUTPATIENT
Start: 2022-04-02 | End: 2022-04-02

## 2022-04-02 RX ORDER — PREDNISONE 10 MG/1
TABLET ORAL
Qty: 48 TABLET | Refills: 0 | Status: SHIPPED | OUTPATIENT
Start: 2022-04-02 | End: 2022-05-13 | Stop reason: SDUPTHER

## 2022-04-02 RX ADMIN — IOPAMIDOL 75 ML: 755 INJECTION, SOLUTION INTRAVENOUS at 01:38

## 2022-04-02 RX ADMIN — LORAZEPAM 0.5 MG: 2 INJECTION INTRAMUSCULAR at 01:21

## 2022-04-02 RX ADMIN — HYDROCODONE POLISTIREX AND CHLORPHENIRAMINE POLISTIREX 5 ML: 10; 8 SUSPENSION, EXTENDED RELEASE ORAL at 00:41

## 2022-04-02 RX ADMIN — RACEPINEPHRINE HYDROCHLORIDE 0.5 ML: 11.25 SOLUTION RESPIRATORY (INHALATION) at 00:24

## 2022-04-02 RX ADMIN — IOPAMIDOL 75 ML: 755 INJECTION, SOLUTION INTRAVENOUS at 01:36

## 2022-04-02 NOTE — ED TRIAGE NOTES
Pt reports COPD exacerbation x 4 days. Has been on steroids and abx x 2 days. Last duoneb @ 2000. Pt has tachypnea with audible wheezes.

## 2022-04-02 NOTE — ED PROVIDER NOTES
EMERGENCY DEPARTMENT HISTORY AND PHYSICAL EXAM          Date: 4/1/2022  Patient Name: Michael Ziegler    History of Presenting Illness     Chief Complaint   Patient presents with    Respiratory Distress       History Provided By: Patient    HPI: Michael Ziegler is a 65 y.o. female, pmhx COPD and restrictive lung disease, who presents to the ED c/o dyspnea that she has had for the last 3 or 4 days. She has taken 2 days' of prednisone 40 mg without any improvement. She has had a dry cough. Has used her neb 3 times today, most recently 3 hours ago. No fevers, chills, congested cough, chest pain, or nausea. She has been able to eat and drink fairly normally. No LE edema. PCP: Teri Rosas MD    Allergies: see list  Social Hx: Former smoker, none since 1995, 2 drinks/night EtOH; [place] . There are no other complaints, changes, or physical findings at this time. Current Outpatient Medications   Medication Sig Dispense Refill    predniSONE (STERAPRED DS) 10 mg dose pack Take 4 days at each daily dosage of 40, 30, 20, 10, and 5 mg. 48 Tablet 0    LORazepam (ATIVAN) 0.5 mg tablet Take 1 Tablet by mouth every six (6) hours as needed for Anxiety. Max Daily Amount: 2 mg. 8 Tablet 0    guaiFENesin ER (Mucinex) 600 mg ER tablet Take 600 mg by mouth.  losartan (COZAAR) 50 mg tablet Take 50 mg by mouth daily. 2 tabs per day      albuterol (PROVENTIL HFA, VENTOLIN HFA, PROAIR HFA) 90 mcg/actuation inhaler Take 2 Puffs by inhalation every four (4) hours as needed.  dilTIAZem ER (CARDIZEM CD) 180 mg capsule  (Patient not taking: Reported on 7/18/2021)      esomeprazole (NEXIUM) 20 mg capsule Take 20 mg by mouth.       Daliresp 500 mcg tab tablet TAKE 1/2 TABLET BY MOUTH DAILY FOR 30 DAYS,THEN TAKE 1 TAB DAILY (Patient not taking: Reported on 7/18/2021)      albuterol-ipratropium (DUO-NEB) 2.5 mg-0.5 mg/3 ml nebu 3 mL by Nebulization route every four (4) hours as needed for Wheezing. 30 Nebule 1    azithromycin (Zithromax Z-Tavares) 250 mg tablet 2 tabs today and then 1 tab daily until completed (Patient not taking: Reported on 7/18/2021) 6 Tab 0    levoFLOXacin (LEVAQUIN) 750 mg tablet Take 1 Tab by mouth daily. Indications: pneumonia (Patient not taking: Reported on 7/18/2021) 5 Tab 0    sodium chloride 3 % nebulizer solution TAKE 4 ML BY NEBULIZATION DAILY      potassium chloride SR (K-TAB) 20 mEq tablet TAKE 1 TABLET BY MOUTH ONCE DAILY FOR LOW POTASSIUM      Tobradex ophthalmic ointment APPLY TO EACH EYE AT BEDTIME AS NEEDED      Oxygen 1 Each by Nasal route daily as needed. Uses one liter of oxygen as needed      amLODIPine (NORVASC) 5 mg tablet Indications: High Blood Pressure Disorder 1 pill daily for blood pressure (Patient not taking: Reported on 7/18/2021)      valACYclovir (VALTREX) 500 mg tablet 1 pill twice daily for 5 days if needed for outbreak      montelukast (SINGULAIR) 10 mg tablet Take 10 mg by mouth At bedtime.  atorvastatin (LIPITOR) 10 mg tablet Take 20 mg by mouth daily.  multivitamin (MULTIPLE VITAMINS PO) Take  by mouth.  loratadine (CLARITIN) 10 mg tablet Take 1 Tab by mouth daily. Indications: ALLERGIC RHINITIS (Patient not taking: Reported on 7/18/2021) 30 Tab 4    fluticasone (FLONASE) 50 mcg/actuation nasal spray One spray per nostril twice a day  Indications: ALLERGIC RHINITIS 1 Bottle 5    naproxen sodium (ALEVE) 220 mg cap Take  by mouth.          Past History     Past Medical History:  Past Medical History:   Diagnosis Date    Chronic obstructive pulmonary disease ([address]     Environmental and seasonal allergies     GERD (gastroesophageal reflux disease)     Herpes     Genital    Hx of colonoscopy 05/22/2017    Hypertension     OA (osteoarthritis)        Past Surgical History:  Past Surgical History:   Procedure Laterality Date    HX BREAST AUGMENTATION      HX CATARACT REMOVAL Bilateral     HX COLONOSCOPY  2005    [facility]    HX COLONOSCOPY  2017    nl    HX ORTHOPAEDIC Left 10/14/2016    left foot planter fibroma    HX ORTHOPAEDIC      right hand fibroma    HX TUBAL LIGATION      IMPLANT BREAST SILICONE/EQ         Family History:  Family History   Problem Relation Age of Onset    Heart Disease Father     Hypertension Mother     Liver Disease Brother         Hepatitis C    Heart Failure Brother        Social History:  Social History     Tobacco Use    Smoking status: Former Smoker     Quit date: 1995     Years since quittin.8    Smokeless tobacco: Never Used   Substance Use Topics    Alcohol use: Yes     Alcohol/week: 63.0 standard drinks     Types: 21 Glasses of wine, 42 Shots of liquor per week     Comment: 2 drinks per night    Drug use: No       Allergies: Allergies   Allergen Reactions    Daliresp [Roflumilast] Diarrhea    Levofloxacin Diarrhea     Diarrhea and stomach cramps    Maxzide [Triamterene-Hydrochlorothiazid] Rash    Pcn [Penicillins] Unknown (comments)         Review of Systems   Review of Systems   Constitutional: Negative for appetite change and fever. HENT: Positive for congestion. Negative for sore throat. Eyes: Negative for photophobia and itching. Respiratory: Positive for cough, chest tightness and shortness of breath. Cardiovascular: Negative for chest pain and leg swelling. Gastrointestinal: Positive for nausea. Negative for abdominal pain, diarrhea and vomiting. Genitourinary: Negative for dysuria and flank pain. Musculoskeletal: Negative for back pain and neck pain. Skin: Negative for rash. Allergic/Immunologic: Positive for environmental allergies. Neurological: Negative for light-headedness and headaches. Physical Exam   Physical Exam  Vitals reviewed. Constitutional:       General: She is not in acute distress. Appearance: She is well-developed. She is not diaphoretic. HENT:      Head: Normocephalic and atraumatic. Right Ear: External ear normal.      Left Ear: External ear normal.      Nose: Nose normal.   Eyes:      General: No scleral icterus. Right eye: No discharge. Left eye: No discharge. Conjunctiva/sclera: Conjunctivae normal.      Pupils: Pupils are equal, round, and reactive to light. Neck:      Thyroid: No thyromegaly. Trachea: No tracheal deviation. Cardiovascular:      Rate and Rhythm: Normal rate and regular rhythm. Heart sounds: Normal heart sounds. No murmur heard. No friction rub. No gallop. Pulmonary:      Effort: Tachypnea, accessory muscle usage and prolonged expiration present. Breath sounds: Stridor and decreased air movement present. Examination of the right-upper field reveals decreased breath sounds. Examination of the left-upper field reveals decreased breath sounds. Examination of the right-middle field reveals decreased breath sounds. Examination of the left-middle field reveals decreased breath sounds. Examination of the right-lower field reveals decreased breath sounds and wheezing. Examination of the left-lower field reveals decreased breath sounds and wheezing. Decreased breath sounds and wheezing present. No rhonchi. Abdominal:      General: Bowel sounds are normal. There is no distension. Palpations: Abdomen is soft. Tenderness: There is no abdominal tenderness. There is no guarding or rebound. Musculoskeletal:         General: No tenderness or deformity. Normal range of motion. Cervical back: Normal range of motion and neck supple. Skin:     General: Skin is warm and dry. Neurological:      Mental Status: She is alert and oriented to person, place, and time. Cranial Nerves: No cranial nerve deficit. Coordination: Coordination normal.      Deep Tendon Reflexes: Reflexes are normal and symmetric.  Reflexes normal.         Diagnostic Study Results     Labs -     Recent Results (from the past 12 hour(s))   CBC WITH AUTOMATED DIFF    Collection Time: 04/01/22 11:30 PM   Result Value Ref Range    WBC 9.5 3.6 - 11.0 K/uL    RBC 3.49 (L) 3.80 - 5.20 M/uL    HGB 12.1 11.5 - 16.0 g/dL    HCT 36.9 35.0 - 47.0 %    .7 (H) 80.0 - 99.0 FL    MCH 34.7 (H) 26.0 - 34.0 PG    MCHC 32.8 30.0 - 36.5 g/dL    RDW 12.9 11.5 - 14.5 %    PLATELET 708 951 - 710 K/uL    MPV 8.8 (L) 8.9 - 12.9 FL    NRBC 0.0 0  WBC    ABSOLUTE NRBC 0.00 0.00 - 0.01 K/uL    NEUTROPHILS 93 (H) 32 - 75 %    LYMPHOCYTES 4 (L) 12 - 49 %    MONOCYTES 2 (L) 5 - 13 %    EOSINOPHILS 0 0 - 7 %    BASOPHILS 0 0 - 1 %    IMMATURE GRANULOCYTES 1 (H) 0.0 - 0.5 %    ABS. NEUTROPHILS 8.8 (H) 1.8 - 8.0 K/UL    ABS. LYMPHOCYTES 0.4 (L) 0.8 - 3.5 K/UL    ABS. MONOCYTES 0.2 0.0 - 1.0 K/UL    ABS. EOSINOPHILS 0.0 0.0 - 0.4 K/UL    ABS. BASOPHILS 0.0 0.0 - 0.1 K/UL    ABS. IMM. GRANS. 0.1 (H) 0.00 - 0.04 K/UL    DF AUTOMATED      PLATELET COMMENTS ADEQUATE PLATELETS      RBC COMMENTS ANISOCYTOSIS  1+        RBC COMMENTS POIKILOCYTOSIS  2+        RBC COMMENTS MACROCYTOSIS  1+        RBC COMMENTS MICROCYTOSIS  1+       METABOLIC PANEL, COMPREHENSIVE    Collection Time: 04/01/22 11:30 PM   Result Value Ref Range    Sodium 136 136 - 145 mmol/L    Potassium 3.9 3.5 - 5.1 mmol/L    Chloride 100 97 - 108 mmol/L    CO2 20 (L) 21 - 32 mmol/L    Anion gap 16 (H) 5 - 15 mmol/L    Glucose 161 (H) 65 - 100 mg/dL    BUN 15 6 - 20 MG/DL    Creatinine 1.18 (H) 0.55 - 1.02 MG/DL    BUN/Creatinine ratio 13 12 - 20      GFR est AA 54 (L) >60 ml/min/1.73m2    GFR est non-AA 44 (L) >60 ml/min/1.73m2    Calcium 8.7 8.5 - 10.1 MG/DL    Bilirubin, total 0.5 0.2 - 1.0 MG/DL    ALT (SGPT) 46 12 - 78 U/L    AST (SGOT) 32 15 - 37 U/L    Alk.  phosphatase 80 45 - 117 U/L    Protein, total 7.0 6.4 - 8.2 g/dL    Albumin 3.8 3.5 - 5.0 g/dL    Globulin 3.2 2.0 - 4.0 g/dL    A-G Ratio 1.2 1.1 - 2.2     TROPONIN-HIGH SENSITIVITY    Collection Time: 04/01/22 11:30 PM   Result Value Ref Range    Troponin-High Sensitivity 7 0 - 51 ng/L   ETHYL ALCOHOL    Collection Time: 04/01/22 11:44 PM   Result Value Ref Range    ALCOHOL(ETHYL),SERUM 117 (H) <10 MG/DL   EKG, 12 LEAD, INITIAL    Collection Time: 04/01/22 11:59 PM   Result Value Ref Range    Ventricular Rate 93 BPM    Atrial Rate 93 BPM    P-R Interval 138 ms    QRS Duration 84 ms    Q-T Interval 358 ms    QTC Calculation (Bezet) 445 ms    Calculated P Axis 26 degrees    Calculated R Axis -14 degrees    Calculated T Axis 15 degrees    Diagnosis       Normal sinus rhythm  Normal ECG  When compared with ECG of 01-AUG-2021 23:48,  Previous ECG has undetermined rhythm, needs review     BLOOD GAS, ARTERIAL    Collection Time: 04/02/22  1:06 AM   Result Value Ref Range    pH 7.36 7.35 - 7.45      PCO2 29 (L) 35 - 45 mmHg    PO2 81 80 - 100 mmHg    O2 SAT 96 92 - 97 %    BICARBONATE 16 (L) 22 - 26 mmol/L    BASE DEFICIT 8.2 mmol/L    O2 METHOD ROOM AIR      Sample source ARTERIAL      SITE LEFT RADIAL      RAMYA'S TEST YES         Radiologic Studies -   CTA CHEST W OR W WO CONT   Final Result      1. No acute pulmonary embolus. 2. Extensive emphysema. Right upper lobe scarring. 3. Small hiatal hernia. 4. Hepatic steatosis. CT NECK SOFT TISSUE W CONT   Final Result   No acute process. XR CHEST PORT   Final Result   No acute process. CT Results  (Last 48 hours)               04/02/22 0136  CTA CHEST W OR W WO CONT Final result    Impression:      1. No acute pulmonary embolus. 2. Extensive emphysema. Right upper lobe scarring. 3. Small hiatal hernia. 4. Hepatic steatosis. Narrative:  INDICATION:  dyspnea, copd       EXAM:  CTA Chest with contrast for Pulmonary Embolus       COMPARISON:  None       TECHNIQUE:  Following the uneventful intravenous administration of IV contrast   thin helical axial images were obtained through the chest. 3D image   postprocessing was performed.   CT dose reduction was achieved through use of a standardized protocol tailored for this examination and automatic exposure   control for dose modulation. FINDINGS:       THYROID: Unremarkable. MEDIASTINUM/TAMMIE: No mass or lymphadenopathy. Small hiatal hernia. HEART/PERICARDIUM: Unremarkable. AORTA:  No aneurysm. PULMONARY ARTERIES:No pulmonary embolism. LUNGS/PLEURA: Extensive emphysema. No edema, consolidation, effusion, or   pneumothorax. Mild scarring in the right upper lobe. INCIDENTALLY IMAGED UPPER ABDOMEN: Hepatic steatosis. BONES: No destructive bone lesion. ADDITIONAL COMMENTS:  Bilateral breast implants           04/02/22 0136  CT NECK SOFT TISSUE W CONT Final result    Impression:  No acute process. Narrative:  INDICATION: dyspnea, stridor vs copd       COMPARISON: None       TECHNIQUE:  Axial neck CT was performed after the uneventful administration of   IV contrast. Sagittal and coronal reconstructions were generated. CT dose reduction was achieved through use of a standardized protocol tailored   for this examination and automatic exposure control for dose modulation. FINDINGS:       NASOPHARYNX: Normal.   SUPRAHYOID NECK: Normal oropharynx, oral cavity, parapharyngeal space, and   retropharyngeal space. INFRAHYOID NECK: Normal larynx, hypopharynx and supraglottis. PAROTID GLANDS: Normal.   SUBMANDIBULAR GLANDS: Normal.   THYROID GLAND: Normal.   LYMPH NODES: None enlarged by CT size criteria . LUNG APICES: Emphysema. OSSEOUS STRUCTURES: No destructive bone lesion. ADDITIONAL COMMENTS: N/A. CXR Results  (Last 48 hours)               04/01/22 2339  XR CHEST PORT Final result    Impression:  No acute process. Narrative:  INDICATION: dyspnea. COPD       EXAM:  AP CHEST RADIOGRAPH       COMPARISON: 8/1/2021       FINDINGS:       AP portable view of the chest demonstrates a normal cardiomediastinal   silhouette. There is no edema, effusion, consolidation, or pneumothorax.  The osseous structures are unremarkable. Medical Decision Making   I am the first provider for this patient. I reviewed the vital signs, available nursing notes, past medical history, past surgical history, family history and social history. Vital Signs-Reviewed the patient's vital signs. Patient Vitals for the past 12 hrs:   Temp Pulse Resp BP SpO2   04/02/22 0231 -- (!) 108 27 132/69 94 %   04/02/22 0201 -- (!) 105 17 (!) 101/58 96 %   04/02/22 0101 -- 99 16 (!) 144/73 96 %   04/02/22 0046 -- 90 13 (!) 129/51 96 %   04/02/22 0024 -- -- -- -- 98 %   04/01/22 2343 -- -- -- -- 98 %   04/01/22 2326 98.6 °F (37 °C) (!) 111 28 (!) 156/85 96 %       Pulse Oximetry Analysis -98 % on RA    Cardiac Monitor:   Rate: 110 bpm  Rhythm: Sinus Tachycardia      Records Reviewed: Nursing Notes, Old Medical Records, Previous Radiology Studies and Previous Laboratory Studies    Provider Notes (Medical Decision Making):   MDM: 66 yo woman with a restrictive form of COPD, not very responsive to bronchodilators on 2020 PFT's. Already on prednisone, taking ipatroprium and albuterol 3-4 times daily. Has an element of stridor, as well as a component of anxiety. She has seasonal allergies but has been staying indoors for the last several days. CTPA and CT of neck done after she seemed to have little relief with Solumedrol and another Duoneb. Both negative. ED Course:   Initial assessment performed. The patients presenting problems have been discussed, and they are in agreement with the care plan formulated and outlined with them. I have encouraged them to ask questions as they arise throughout their visit. EKG interpretation: (Preliminary)  Rhythm: NSR, HR 93, Normal ECG. This EKG was interpreted by ED Provider Dr Stacia Cervantes MD      PROGRESS NOTE:  Pt given 3 liters of oxygen, Solumedrol 125 mg IV, and a Duoneb. She was decreased to 1 liter oxygen after her neb.  SpO2 remained 96% but she did not really feel any better. A racemic epinephrine neb was given. She had a lot of difficulty with a cough, so she was given Tussionex, 5 ml po. Discharge note:  Pt re-evaluated and noted to be feeling better, ready for discharge. Updated pt on all final lab and Xray findings. Will follow up as instructed . All questions have been answered, pt voiced understanding and agreement with plan. Specific return precautions provided as well as instructions to return to the ED should sx worsen at any time. Vital signs stable for discharge. Critical Care Time:   0      Diagnosis     Clinical Impression:   1. Acute exacerbation of chronic obstructive pulmonary disease (COPD) ([facility] [address]        PLAN:  1. Discharge Medication List as of 4/2/2022  2:50 AM      START taking these medications    Details   predniSONE (STERAPRED DS) 10 mg dose pack Take 4 days at each daily dosage of 40, 30, 20, 10, and 5 mg., Normal, Disp-48 Tablet, R-0      LORazepam (ATIVAN) 0.5 mg tablet Take 1 Tablet by mouth every six (6) hours as needed for Anxiety. Max Daily Amount: 2 mg., Normal, Disp-8 Tablet, R-0         CONTINUE these medications which have NOT CHANGED    Details   guaiFENesin ER (Mucinex) 600 mg ER tablet Take 600 mg by mouth., Historical Med      losartan (COZAAR) 50 mg tablet Take 50 mg by mouth daily.  2 tabs per day, Historical Med      albuterol (PROVENTIL HFA, VENTOLIN HFA, PROAIR HFA) 90 mcg/actuation inhaler Take 2 Puffs by inhalation every four (4) hours as needed., Historical Med      dilTIAZem ER (CARDIZEM CD) 180 mg capsule Historical Med      esomeprazole (NEXIUM) 20 mg capsule Take 20 mg by mouth., Historical Med      Daliresp 500 mcg tab tablet TAKE 1/2 TABLET BY MOUTH DAILY FOR 30 DAYS,THEN TAKE 1 TAB DAILY, Historical Med, SHAW      albuterol-ipratropium (DUO-NEB) 2.5 mg-0.5 mg/3 ml nebu 3 mL by Nebulization route every four (4) hours as needed for Wheezing., Normal, Disp-30 Nebule, R-1      azithromycin (Zithromax Z-Tavares) 250 mg tablet 2 tabs today and then 1 tab daily until completed, Normal, Disp-6 Tab, R-0      levoFLOXacin (LEVAQUIN) 750 mg tablet Take 1 Tab by mouth daily. Indications: pneumonia, Normal, Disp-5 Tab,R-0      sodium chloride 3 % nebulizer solution TAKE 4 ML BY NEBULIZATION DAILY, Historical Med      potassium chloride SR (K-TAB) 20 mEq tablet TAKE 1 TABLET BY MOUTH ONCE DAILY FOR LOW POTASSIUM, Historical Med      Tobradex ophthalmic ointment APPLY TO EACH EYE AT BEDTIME AS NEEDED, Historical Med, SHAW      Oxygen 1 Each by Nasal route daily as needed. Uses one liter of oxygen as needed, Historical Med      amLODIPine (NORVASC) 5 mg tablet Indications: High Blood Pressure Disorder 1 pill daily for blood pressure, Historical Med      valACYclovir (VALTREX) 500 mg tablet 1 pill twice daily for 5 days if needed for outbreak, Historical Med      montelukast (SINGULAIR) 10 mg tablet Take 10 mg by mouth At bedtime. , Historical Med      atorvastatin (LIPITOR) 10 mg tablet Take 20 mg by mouth daily. , Historical Med      multivitamin (MULTIPLE VITAMINS PO) Take  by mouth., Historical Med      loratadine (CLARITIN) 10 mg tablet Take 1 Tab by mouth daily. Indications: ALLERGIC RHINITIS, Normal, Disp-30 Tab, R-4      fluticasone (FLONASE) 50 mcg/actuation nasal spray One spray per nostril twice a day  Indications: ALLERGIC RHINITIS, Normal, Disp-1 Bottle, R-5      naproxen sodium (ALEVE) 220 mg cap Take  by mouth., Historical Med           2.    Follow-up Information    None       Return to ED if worse     Disposition:  Home

## 2022-04-02 NOTE — DISCHARGE INSTRUCTIONS
--Extend your prednisone taper to 3 or 4 days of 40, 30, 20, and 10  mg daily. (You can also go to 5 mg daily. )  --Lorazepam 0.5 mg every 6 hours as needed for anxiety. Be aware that this is a habit forming medicine. Try not to take it every day. --Follow up with your pulmonologist or Dr. Deedee Stratton next week.

## 2022-04-03 LAB
ATRIAL RATE: 93 BPM
CALCULATED P AXIS, ECG09: 26 DEGREES
CALCULATED R AXIS, ECG10: -14 DEGREES
CALCULATED T AXIS, ECG11: 15 DEGREES
DIAGNOSIS, 93000: NORMAL
P-R INTERVAL, ECG05: 138 MS
Q-T INTERVAL, ECG07: 358 MS
QRS DURATION, ECG06: 84 MS
QTC CALCULATION (BEZET), ECG08: 445 MS
VENTRICULAR RATE, ECG03: 93 BPM

## 2022-05-01 ENCOUNTER — TRANSCRIBE ORDER (OUTPATIENT)
Dept: SCHEDULING | Age: 77
End: 2022-05-01

## 2022-05-01 DIAGNOSIS — M81.0 AGE-RELATED OSTEOPOROSIS WITHOUT CURRENT PATHOLOGICAL FRACTURE: Primary | ICD-10-CM

## 2022-05-02 ENCOUNTER — TRANSCRIBE ORDER (OUTPATIENT)
Dept: SCHEDULING | Age: 77
End: 2022-05-02

## 2022-05-11 ENCOUNTER — HOSPITAL ENCOUNTER (OUTPATIENT)
Dept: GENERAL RADIOLOGY | Age: 77
Discharge: HOME | End: 2022-05-11
Attending: FAMILY MEDICINE
Payer: MEDICARE

## 2022-05-11 DIAGNOSIS — M81.0 AGE-RELATED OSTEOPOROSIS WITHOUT CURRENT PATHOLOGICAL FRACTURE: ICD-10-CM

## 2022-05-11 PROCEDURE — 77080 DXA BONE DENSITY AXIAL: CPT

## 2022-05-13 ENCOUNTER — HOSPITAL ENCOUNTER (EMERGENCY)
Age: 77
Discharge: HOME | End: 2022-05-13
Attending: EMERGENCY MEDICINE
Payer: MEDICARE

## 2022-05-13 ENCOUNTER — APPOINTMENT (OUTPATIENT)
Dept: GENERAL RADIOLOGY | Age: 77
End: 2022-05-13
Attending: EMERGENCY MEDICINE
Payer: MEDICARE

## 2022-05-13 VITALS
SYSTOLIC BLOOD PRESSURE: 156 MMHG | OXYGEN SATURATION: 93 % | HEART RATE: 96 BPM | HEIGHT: 62 IN | DIASTOLIC BLOOD PRESSURE: 66 MMHG | WEIGHT: 125 LBS | RESPIRATION RATE: 24 BRPM | BODY MASS INDEX: 23 KG/M2 | TEMPERATURE: 98.6 F

## 2022-05-13 DIAGNOSIS — J44.1 ACUTE EXACERBATION OF CHRONIC OBSTRUCTIVE PULMONARY DISEASE (COPD) (HCC): ICD-10-CM

## 2022-05-13 DIAGNOSIS — J06.9 VIRAL UPPER RESPIRATORY INFECTION: Primary | ICD-10-CM

## 2022-05-13 LAB
ALBUMIN SERPL-MCNC: 3.7 G/DL (ref 3.5–5)
ALBUMIN/GLOB SERPL: 1.2 {RATIO} (ref 1.1–2.2)
ALP SERPL-CCNC: 100 U/L (ref 45–117)
ALT SERPL-CCNC: 53 U/L (ref 12–78)
ANION GAP SERPL CALC-SCNC: 13 MMOL/L (ref 5–15)
APTT PPP: 27.8 SEC (ref 22.1–31)
AST SERPL-CCNC: 31 U/L (ref 15–37)
BASOPHILS # BLD: 0.1 K/UL (ref 0–0.1)
BASOPHILS NFR BLD: 1 % (ref 0–1)
BILIRUB SERPL-MCNC: 0.8 MG/DL (ref 0.2–1)
BUN SERPL-MCNC: 9 MG/DL (ref 6–20)
BUN/CREAT SERPL: 8 (ref 12–20)
CALCIUM SERPL-MCNC: 8.7 MG/DL (ref 8.5–10.1)
CHLORIDE SERPL-SCNC: 99 MMOL/L (ref 97–108)
CO2 SERPL-SCNC: 25 MMOL/L (ref 21–32)
CREAT SERPL-MCNC: 1.17 MG/DL (ref 0.55–1.02)
DIFFERENTIAL METHOD BLD: ABNORMAL
EOSINOPHIL # BLD: 0.2 K/UL (ref 0–0.4)
EOSINOPHIL NFR BLD: 2 % (ref 0–7)
ERYTHROCYTE [DISTWIDTH] IN BLOOD BY AUTOMATED COUNT: 12.5 % (ref 11.5–14.5)
FLUAV RNA SPEC QL NAA+PROBE: NOT DETECTED
FLUBV RNA SPEC QL NAA+PROBE: NOT DETECTED
GLOBULIN SER CALC-MCNC: 3.1 G/DL (ref 2–4)
GLUCOSE SERPL-MCNC: 107 MG/DL (ref 65–100)
HCT VFR BLD AUTO: 41.4 % (ref 35–47)
HGB BLD-MCNC: 14.1 G/DL (ref 11.5–16)
IMM GRANULOCYTES # BLD AUTO: 0 K/UL (ref 0–0.04)
IMM GRANULOCYTES NFR BLD AUTO: 0 % (ref 0–0.5)
INR PPP: 1 (ref 0.9–1.1)
LACTATE SERPL-SCNC: 1.9 MMOL/L (ref 0.4–2)
LYMPHOCYTES # BLD: 1.3 K/UL (ref 0.8–3.5)
LYMPHOCYTES NFR BLD: 17 % (ref 12–49)
MCH RBC QN AUTO: 34.6 PG (ref 26–34)
MCHC RBC AUTO-ENTMCNC: 34.1 G/DL (ref 30–36.5)
MCV RBC AUTO: 101.7 FL (ref 80–99)
MONOCYTES # BLD: 1.3 K/UL (ref 0–1)
MONOCYTES NFR BLD: 16 % (ref 5–13)
NEUTS SEG # BLD: 5.2 K/UL (ref 1.8–8)
NEUTS SEG NFR BLD: 64 % (ref 32–75)
NRBC # BLD: 0 K/UL (ref 0–0.01)
NRBC BLD-RTO: 0 PER 100 WBC
PLATELET # BLD AUTO: 213 K/UL (ref 150–400)
PMV BLD AUTO: 9.1 FL (ref 8.9–12.9)
POTASSIUM SERPL-SCNC: 4.3 MMOL/L (ref 3.5–5.1)
PROT SERPL-MCNC: 6.8 G/DL (ref 6.4–8.2)
PROTHROMBIN TIME: 10.1 SEC (ref 9–11.1)
RBC # BLD AUTO: 4.07 M/UL (ref 3.8–5.2)
SARS-COV-2, COV2: NOT DETECTED
SODIUM SERPL-SCNC: 137 MMOL/L (ref 136–145)
THERAPEUTIC RANGE,PTTT: NORMAL SECS (ref 58–77)
TROPONIN-HIGH SENSITIVITY: 7 NG/L (ref 0–51)
WBC # BLD AUTO: 8.1 K/UL (ref 3.6–11)

## 2022-05-13 PROCEDURE — 94640 AIRWAY INHALATION TREATMENT: CPT

## 2022-05-13 PROCEDURE — 36415 COLL VENOUS BLD VENIPUNCTURE: CPT

## 2022-05-13 PROCEDURE — 85730 THROMBOPLASTIN TIME PARTIAL: CPT

## 2022-05-13 PROCEDURE — 84484 ASSAY OF TROPONIN QUANT: CPT

## 2022-05-13 PROCEDURE — 87636 SARSCOV2 & INF A&B AMP PRB: CPT

## 2022-05-13 PROCEDURE — 85610 PROTHROMBIN TIME: CPT

## 2022-05-13 PROCEDURE — 74011000250 HC RX REV CODE- 250: Performed by: EMERGENCY MEDICINE

## 2022-05-13 PROCEDURE — 96374 THER/PROPH/DIAG INJ IV PUSH: CPT

## 2022-05-13 PROCEDURE — 99285 EMERGENCY DEPT VISIT HI MDM: CPT

## 2022-05-13 PROCEDURE — 74011250636 HC RX REV CODE- 250/636: Performed by: EMERGENCY MEDICINE

## 2022-05-13 PROCEDURE — 87040 BLOOD CULTURE FOR BACTERIA: CPT

## 2022-05-13 PROCEDURE — 80053 COMPREHEN METABOLIC PANEL: CPT

## 2022-05-13 PROCEDURE — 77030029684 HC NEB SM VOL KT MONA -A

## 2022-05-13 PROCEDURE — 93005 ELECTROCARDIOGRAM TRACING: CPT

## 2022-05-13 PROCEDURE — 85025 COMPLETE CBC W/AUTO DIFF WBC: CPT

## 2022-05-13 PROCEDURE — 71046 X-RAY EXAM CHEST 2 VIEWS: CPT

## 2022-05-13 PROCEDURE — 83605 ASSAY OF LACTIC ACID: CPT

## 2022-05-13 RX ORDER — IPRATROPIUM BROMIDE AND ALBUTEROL SULFATE 2.5; .5 MG/3ML; MG/3ML
3 SOLUTION RESPIRATORY (INHALATION)
Status: COMPLETED | OUTPATIENT
Start: 2022-05-13 | End: 2022-05-13

## 2022-05-13 RX ORDER — PREDNISONE 10 MG/1
TABLET ORAL
Qty: 30 TABLET | Refills: 0 | Status: SHIPPED | OUTPATIENT
Start: 2022-05-13 | End: 2022-05-27

## 2022-05-13 RX ADMIN — IPRATROPIUM BROMIDE AND ALBUTEROL SULFATE 3 ML: 2.5; .5 SOLUTION RESPIRATORY (INHALATION) at 13:52

## 2022-05-13 RX ADMIN — SODIUM CHLORIDE 500 ML: 9 INJECTION, SOLUTION INTRAVENOUS at 13:31

## 2022-05-13 RX ADMIN — SODIUM CHLORIDE 1000 ML: 9 INJECTION, SOLUTION INTRAVENOUS at 13:36

## 2022-05-13 RX ADMIN — METHYLPREDNISOLONE SODIUM SUCCINATE 40 MG: 40 INJECTION, POWDER, FOR SOLUTION INTRAMUSCULAR; INTRAVENOUS at 13:35

## 2022-05-13 NOTE — ED PROVIDER NOTES
EMERGENCY DEPARTMENT HISTORY AND PHYSICAL EXAM          Date: 5/13/2022  Patient Name: Hamilton Posada    History of Presenting Illness     Chief Complaint   Patient presents with    Cough       History Provided By: Patient    HPI: Hamilton Posada is a 65 y.o. female, pmhx hypertension, osteoarthritis, COPD GERD, as needed home oxygen, who presents by car to the ED c/o shortness of breath, cough. Patient states that last week she was with her daughter who developed a URI, 4 to 5 days ago she began developing a cough which initially did not exacerbate her COPD, now is having more difficulty with breathing but is more affected by paroxysms of coughing. Patient states that she has sharp pain in her chest with coughing, states that she was coughing up some greenish phlegm. Patient denies fever or chills. Patient specifically denies any recent fevers, chills, nausea, vomiting, diarrhea, abd pain,  urinary sxs, changes in BM, or headache. PCP: Andrews Paul MD        There are no other complaints, changes, or physical findings at this time. Current Outpatient Medications   Medication Sig Dispense Refill    predniSONE (STERAPRED DS) 10 mg dose pack Take 2 days at each daily dosage of 30, 20, 10, and 5 mg. 30 Tablet 0    LORazepam (ATIVAN) 0.5 mg tablet Take 1 Tablet by mouth every six (6) hours as needed for Anxiety. Max Daily Amount: 2 mg. 8 Tablet 0    guaiFENesin ER (Mucinex) 600 mg ER tablet Take 600 mg by mouth.  losartan (COZAAR) 50 mg tablet Take 50 mg by mouth daily. 2 tabs per day      albuterol (PROVENTIL HFA, VENTOLIN HFA, PROAIR HFA) 90 mcg/actuation inhaler Take 2 Puffs by inhalation every four (4) hours as needed.  dilTIAZem ER (CARDIZEM CD) 180 mg capsule  (Patient not taking: Reported on 7/18/2021)      esomeprazole (NEXIUM) 20 mg capsule Take 20 mg by mouth.       Daliresp 500 mcg tab tablet TAKE 1/2 TABLET BY MOUTH DAILY FOR 30 DAYS,THEN TAKE 1 TAB DAILY (Patient not taking: Reported on 7/18/2021)      albuterol-ipratropium (DUO-NEB) 2.5 mg-0.5 mg/3 ml nebu 3 mL by Nebulization route every four (4) hours as needed for Wheezing. 30 Nebule 1    azithromycin (Zithromax Z-Tavares) 250 mg tablet 2 tabs today and then 1 tab daily until completed (Patient not taking: Reported on 7/18/2021) 6 Tab 0    levoFLOXacin (LEVAQUIN) 750 mg tablet Take 1 Tab by mouth daily. Indications: pneumonia (Patient not taking: Reported on 7/18/2021) 5 Tab 0    sodium chloride 3 % nebulizer solution TAKE 4 ML BY NEBULIZATION DAILY      potassium chloride SR (K-TAB) 20 mEq tablet TAKE 1 TABLET BY MOUTH ONCE DAILY FOR LOW POTASSIUM      Tobradex ophthalmic ointment APPLY TO EACH EYE AT BEDTIME AS NEEDED      Oxygen 1 Each by Nasal route daily as needed. Uses one liter of oxygen as needed      amLODIPine (NORVASC) 5 mg tablet Indications: High Blood Pressure Disorder 1 pill daily for blood pressure (Patient not taking: Reported on 7/18/2021)      valACYclovir (VALTREX) 500 mg tablet 1 pill twice daily for 5 days if needed for outbreak      montelukast (SINGULAIR) 10 mg tablet Take 10 mg by mouth At bedtime.  atorvastatin (LIPITOR) 10 mg tablet Take 20 mg by mouth daily.  multivitamin (MULTIPLE VITAMINS PO) Take  by mouth.  loratadine (CLARITIN) 10 mg tablet Take 1 Tab by mouth daily. Indications: ALLERGIC RHINITIS (Patient not taking: Reported on 7/18/2021) 30 Tab 4    fluticasone (FLONASE) 50 mcg/actuation nasal spray One spray per nostril twice a day  Indications: ALLERGIC RHINITIS 1 Bottle 5    naproxen sodium (ALEVE) 220 mg cap Take  by mouth.          Past History     Past Medical History:  Past Medical History:   Diagnosis Date    Chronic obstructive pulmonary disease ([address]     Environmental and seasonal allergies     GERD (gastroesophageal reflux disease)     Herpes     Genital    Hx of colonoscopy 05/22/2017    Hypertension     OA (osteoarthritis)        Past Surgical History:  Past Surgical History:   Procedure Laterality Date    HX BREAST AUGMENTATION      HX CATARACT REMOVAL Bilateral     HX COLONOSCOPY  2005    [facility]    HX COLONOSCOPY  2017    nl    HX ORTHOPAEDIC Left 10/14/2016    left foot planter fibroma    HX ORTHOPAEDIC      right hand fibroma    HX TUBAL LIGATION      IMPLANT BREAST SILICONE/EQ         Family History:  Family History   Problem Relation Age of Onset    Heart Disease Father     Hypertension Mother     Liver Disease Brother         Hepatitis C    Heart Failure Brother        Social History:  Social History     Tobacco Use    Smoking status: Former Smoker     Quit date: 1995     Years since quittin.9    Smokeless tobacco: Never Used   Substance Use Topics    Alcohol use: Yes     Alcohol/week: 63.0 standard drinks     Types: 21 Glasses of wine, 42 Shots of liquor per week     Comment: 2 drinks per night    Drug use: No     Social Hx: Quit tobacco, no vaping, regular EtOH, no illicit Drugs; Lives with     Allergies: Allergies   Allergen Reactions    Daliresp [Roflumilast] Diarrhea    Levofloxacin Diarrhea     Diarrhea and stomach cramps    Maxzide [Triamterene-Hydrochlorothiazid] Rash    Pcn [Penicillins] Unknown (comments)         Review of Systems   Review of Systems   Constitutional: Positive for fatigue. Negative for fever. HENT: Negative for sore throat. Eyes: Negative for pain. Respiratory: Positive for cough and shortness of breath. Cardiovascular: Positive for chest pain. Negative for leg swelling. Gastrointestinal: Negative for abdominal pain. Endocrine: Negative for polyuria. Genitourinary: Negative for flank pain and hematuria. Musculoskeletal: Negative for gait problem. Neurological: Negative for syncope and headaches. Psychiatric/Behavioral: Negative for behavioral problems and hallucinations. All other systems reviewed and are negative.       Physical Exam   Physical Exam  Vitals and nursing note reviewed. Constitutional:       Appearance: Normal appearance. Comments: Moderate distress, initially tachycardic to 126 with respiratory rate 24, sepsis protocol started but suspect COPD exacerbation   HENT:      Head: Normocephalic. Right Ear: External ear normal.      Left Ear: External ear normal.   Eyes:      Pupils: Pupils are equal, round, and reactive to light. Cardiovascular:      Rate and Rhythm: Normal rate and regular rhythm. Pulmonary:      Effort: Pulmonary effort is normal.      Breath sounds: Rales present. Comments: Patient has bilateral rales  Abdominal:      Palpations: Abdomen is soft. Tenderness: There is no abdominal tenderness. Musculoskeletal:         General: Normal range of motion. Cervical back: Normal range of motion and neck supple. Skin:     General: Skin is warm and dry. Neurological:      General: No focal deficit present. Mental Status: She is alert and oriented to person, place, and time.    Psychiatric:         Mood and Affect: Mood normal.         Behavior: Behavior normal.         Diagnostic Study Results     Labs -     Recent Results (from the past 12 hour(s))   EKG, 12 LEAD, INITIAL    Collection Time: 05/13/22  1:05 PM   Result Value Ref Range    Ventricular Rate 104 BPM    Atrial Rate 104 BPM    P-R Interval 136 ms    QRS Duration 78 ms    Q-T Interval 338 ms    QTC Calculation (Bezet) 444 ms    Calculated P Axis 47 degrees    Calculated R Axis -19 degrees    Calculated T Axis 30 degrees    Diagnosis       Sinus tachycardia  Otherwise normal ECG  When compared with ECG of 01-APR-2022 23:59,  Nonspecific T wave abnormality no longer evident in Anterior leads     CULTURE, BLOOD, PAIRED    Collection Time: 05/13/22  1:12 PM    Specimen: Blood   Result Value Ref Range    Special Requests: NO SPECIAL REQUESTS      Culture result: NO GROWTH AFTER 2 HOURS     CBC WITH AUTOMATED DIFF    Collection Time: 05/13/22 1:14 PM   Result Value Ref Range    WBC 8.1 3.6 - 11.0 K/uL    RBC 4.07 3.80 - 5.20 M/uL    HGB 14.1 11.5 - 16.0 g/dL    HCT 41.4 35.0 - 47.0 %    .7 (H) 80.0 - 99.0 FL    MCH 34.6 (H) 26.0 - 34.0 PG    MCHC 34.1 30.0 - 36.5 g/dL    RDW 12.5 11.5 - 14.5 %    PLATELET 047 665 - 945 K/uL    MPV 9.1 8.9 - 12.9 FL    NRBC 0.0 0  WBC    ABSOLUTE NRBC 0.00 0.00 - 0.01 K/uL    NEUTROPHILS 64 32 - 75 %    LYMPHOCYTES 17 12 - 49 %    MONOCYTES 16 (H) 5 - 13 %    EOSINOPHILS 2 0 - 7 %    BASOPHILS 1 0 - 1 %    IMMATURE GRANULOCYTES 0 0.0 - 0.5 %    ABS. NEUTROPHILS 5.2 1.8 - 8.0 K/UL    ABS. LYMPHOCYTES 1.3 0.8 - 3.5 K/UL    ABS. MONOCYTES 1.3 (H) 0.0 - 1.0 K/UL    ABS. EOSINOPHILS 0.2 0.0 - 0.4 K/UL    ABS. BASOPHILS 0.1 0.0 - 0.1 K/UL    ABS. IMM. GRANS. 0.0 0.00 - 0.04 K/UL    DF AUTOMATED     METABOLIC PANEL, COMPREHENSIVE    Collection Time: 05/13/22  1:14 PM   Result Value Ref Range    Sodium 137 136 - 145 mmol/L    Potassium 4.3 3.5 - 5.1 mmol/L    Chloride 99 97 - 108 mmol/L    CO2 25 21 - 32 mmol/L    Anion gap 13 5 - 15 mmol/L    Glucose 107 (H) 65 - 100 mg/dL    BUN 9 6 - 20 MG/DL    Creatinine 1.17 (H) 0.55 - 1.02 MG/DL    BUN/Creatinine ratio 8 (L) 12 - 20      GFR est AA 54 (L) >60 ml/min/1.73m2    GFR est non-AA 45 (L) >60 ml/min/1.73m2    Calcium 8.7 8.5 - 10.1 MG/DL    Bilirubin, total 0.8 0.2 - 1.0 MG/DL    ALT (SGPT) 53 12 - 78 U/L    AST (SGOT) 31 15 - 37 U/L    Alk.  phosphatase 100 45 - 117 U/L    Protein, total 6.8 6.4 - 8.2 g/dL    Albumin 3.7 3.5 - 5.0 g/dL    Globulin 3.1 2.0 - 4.0 g/dL    A-G Ratio 1.2 1.1 - 2.2     LACTIC ACID    Collection Time: 05/13/22  1:14 PM   Result Value Ref Range    Lactic acid 1.9 0.4 - 2.0 MMOL/L   PROTHROMBIN TIME + INR    Collection Time: 05/13/22  1:14 PM   Result Value Ref Range    INR 1.0 0.9 - 1.1      Prothrombin time 10.1 9.0 - 11.1 sec   PTT    Collection Time: 05/13/22  1:14 PM   Result Value Ref Range    aPTT 27.8 22.1 - 31.0 sec    aPTT, therapeutic range     58.0 - 77.0 SECS   TROPONIN-HIGH SENSITIVITY    Collection Time: 05/13/22  1:14 PM   Result Value Ref Range    Troponin-High Sensitivity 7 0 - 51 ng/L   COVID-19 WITH INFLUENZA A/B    Collection Time: 05/13/22  1:17 PM   Result Value Ref Range    SARS-CoV-2 by PCR Not detected NOTD      Influenza A by PCR Not detected NOTD      Influenza B by PCR Not detected NOTD         Radiologic Studies -   XR CHEST PA LAT   Final Result   No acute process or change compared to the prior exam.           CT Results  (Last 48 hours)    None        CXR Results  (Last 48 hours)               05/13/22 1232  XR CHEST PA LAT Final result    Impression:  No acute process or change compared to the prior exam.           Narrative:  Exam:  2 view chest       Indication: Cough       Comparison to 4/1/2022. PA and lateral views demonstrate normal heart size. There is no acute process in   the lung fields. The osseous structures are unremarkable. Medical Decision Making   I am the first provider for this patient. I reviewed the vital signs, available nursing notes, past medical history, past surgical history, family history and social history. Vital Signs-Reviewed the patient's vital signs. Patient Vitals for the past 12 hrs:   Temp Pulse Resp BP SpO2   05/13/22 1525 98.6 °F (37 °C) 96 24 (!) 156/66 93 %   05/13/22 1400 98.6 °F (37 °C) (!) 110 24 109/70 93 %   05/13/22 1255 98.5 °F (36.9 °C) (!) 126 24 103/70 93 %       Pulse Oximetry Analysis - 93% on  RA    Cardiac Monitor:   Rate: 126bpm  Rhythm: Normal Sinus Rhythm      Records Reviewed: Nursing Notes and Old Medical Records    Provider Notes (Medical Decision Making):   MDM: 67-year-old female presents with onset of runny nose, cough, URI symptoms overlying chronic COPD. Patient's chest x-ray reveals no evidence of pneumonia, COVID, influenza A and influenza B negative.   Patient feels much improved with 40 mg of Solu-Medrol, albuterol neb.  Patient comfortable going home, will start on prednisone taper. ED Course:   Initial assessment performed. The patients presenting problems have been discussed, and they are in agreement with the care plan formulated and outlined with them. I have encouraged them to ask questions as they arise throughout their visit. EKG interpretation: (Preliminary)  EKG: EKG at 1305 shows a sinus tachycardia, rate 104, no ectopy, no AV block, no concerning ST changes. This EKG was interpreted by ED Provider Bianca Caldwell MD              Discharge note:    Pt re-evaluated and noted to be feeling better , ready for discharge. Updated pt and  on all final lab, x-ray, EKG findings. Will follow up as instructed . All questions have been answered, pt voiced understanding and agreement with plan. Specific return precautions provided as well as instructions to return to the ED should sx worsen at any time. Vital signs stable for discharge. Critical Care Time:   CRITICAL CARE NOTE :    Due to the patient's unstable presentation, the patient required my immediate attention and intervention. I had to stop attending to other patients to immediately assess the patient because of resp distress, SIRS critieria. IMPENDING DETERIORATION -Respiratory  ASSOCIATED RISK FACTORS - Hypoxia and Dysrhythmia  MANAGEMENT- Bedside Assessment and Supervision of Care    Respiratory Distress  Medrol  Serial Albuterol Nebs        INTERPRETATION -  Xrays, ECG and Cardiac Output Measures   INTERVENTIONS - Copd management  CASE REVIEW - Family  TREATMENT RESPONSE -Improved  PERFORMED BY - Bianca Caldwell MD    NOTES   :  I have spent 35  minutes of critical care time involved in lab review, consultations with specialist, family decision- making, bedside attention and documentation. During this entire length of time I was immediately available to the patient. This does not include time spent performing procedures.   Alfredo Mckinney Luther Leiva MD      Diagnosis     Clinical Impression:   1. Viral upper respiratory infection    2. Acute exacerbation of chronic obstructive pulmonary disease ([facility]D) ([facility]        PLAN:  1. Discharge Medication List as of 5/13/2022  3:18 PM      CONTINUE these medications which have CHANGED    Details   predniSONE (STERAPRED DS) 10 mg dose pack Take 2 days at each daily dosage of 30, 20, 10, and 5 mg., Normal, Disp-30 Tablet, R-0         CONTINUE these medications which have NOT CHANGED    Details   LORazepam (ATIVAN) 0.5 mg tablet Take 1 Tablet by mouth every six (6) hours as needed for Anxiety. Max Daily Amount: 2 mg., Normal, Disp-8 Tablet, R-0      guaiFENesin ER (Mucinex) 600 mg ER tablet Take 600 mg by mouth., Historical Med      losartan (COZAAR) 50 mg tablet Take 50 mg by mouth daily. 2 tabs per day, Historical Med      albuterol (PROVENTIL HFA, VENTOLIN HFA, PROAIR HFA) 90 mcg/actuation inhaler Take 2 Puffs by inhalation every four (4) hours as needed., Historical Med      dilTIAZem ER (CARDIZEM CD) 180 mg capsule Historical Med      esomeprazole (NEXIUM) 20 mg capsule Take 20 mg by mouth., Historical Med      Daliresp 500 mcg tab tablet TAKE 1/2 TABLET BY MOUTH DAILY FOR 30 DAYS,THEN TAKE 1 TAB DAILY, Historical Med, SHAW      albuterol-ipratropium (DUO-NEB) 2.5 mg-0.5 mg/3 ml nebu 3 mL by Nebulization route every four (4) hours as needed for Wheezing., Normal, Disp-30 Nebule, R-1      azithromycin (Zithromax Z-Tavares) 250 mg tablet 2 tabs today and then 1 tab daily until completed, Normal, Disp-6 Tab, R-0      levoFLOXacin (LEVAQUIN) 750 mg tablet Take 1 Tab by mouth daily.  Indications: pneumonia, Normal, Disp-5 Tab,R-0      sodium chloride 3 % nebulizer solution TAKE 4 ML BY NEBULIZATION DAILY, Historical Med      potassium chloride SR (K-TAB) 20 mEq tablet TAKE 1 TABLET BY MOUTH ONCE DAILY FOR LOW POTASSIUM, Historical Med      Tobradex ophthalmic ointment APPLY TO EACH EYE AT BEDTIME AS NEEDED, Historical Med, SHAW      Oxygen 1 Each by Nasal route daily as needed. Uses one liter of oxygen as needed, Historical Med      amLODIPine (NORVASC) 5 mg tablet Indications: High Blood Pressure Disorder 1 pill daily for blood pressure, Historical Med      valACYclovir (VALTREX) 500 mg tablet 1 pill twice daily for 5 days if needed for outbreak, Historical Med      montelukast (SINGULAIR) 10 mg tablet Take 10 mg by mouth At bedtime. , Historical Med      atorvastatin (LIPITOR) 10 mg tablet Take 20 mg by mouth daily. , Historical Med      multivitamin (MULTIPLE VITAMINS PO) Take  by mouth., Historical Med      loratadine (CLARITIN) 10 mg tablet Take 1 Tab by mouth daily. Indications: ALLERGIC RHINITIS, Normal, Disp-30 Tab, R-4      fluticasone (FLONASE) 50 mcg/actuation nasal spray One spray per nostril twice a day  Indications: ALLERGIC RHINITIS, Normal, Disp-1 Bottle, R-5      naproxen sodium (ALEVE) 220 mg cap Take  by mouth., Historical Med           2. Follow-up Information     Follow up With Specialties Details Why Contact Info    Naty West MD Family Medicine   [address] [place] [address]  [phone]          Return to ED if worse     Disposition:  Home       Please note, this dictation was completed with [employer], the [employer] voice recognition software. Quite often unanticipated grammatical, syntax, homophones, and other interpretive errors are inadvertently transcribed by the computer software. Please disregard these errors. Please excuse any errors that have escaped final proof reading.

## 2022-05-15 LAB
ATRIAL RATE: 104 BPM
CALCULATED P AXIS, ECG09: 47 DEGREES
CALCULATED R AXIS, ECG10: -19 DEGREES
CALCULATED T AXIS, ECG11: 30 DEGREES
DIAGNOSIS, 93000: NORMAL
P-R INTERVAL, ECG05: 136 MS
Q-T INTERVAL, ECG07: 338 MS
QRS DURATION, ECG06: 78 MS
QTC CALCULATION (BEZET), ECG08: 444 MS
VENTRICULAR RATE, ECG03: 104 BPM

## 2022-05-20 LAB
BACTERIA SPEC CULT: NORMAL
SERVICE CMNT-IMP: NORMAL

## 2022-05-24 ENCOUNTER — APPOINTMENT (OUTPATIENT)
Dept: CT IMAGING | Age: 77
DRG: 192 | End: 2022-05-24
Attending: EMERGENCY MEDICINE
Payer: MEDICARE

## 2022-05-24 ENCOUNTER — HOSPITAL ENCOUNTER (INPATIENT)
Age: 77
LOS: 3 days | Discharge: HOME | DRG: 192 | End: 2022-05-27
Attending: EMERGENCY MEDICINE | Admitting: INTERNAL MEDICINE
Payer: MEDICARE

## 2022-05-24 ENCOUNTER — APPOINTMENT (OUTPATIENT)
Dept: GENERAL RADIOLOGY | Age: 77
DRG: 192 | End: 2022-05-24
Attending: EMERGENCY MEDICINE
Payer: MEDICARE

## 2022-05-24 DIAGNOSIS — J44.1 ACUTE EXACERBATION OF CHRONIC OBSTRUCTIVE PULMONARY DISEASE (COPD) (HCC): Primary | ICD-10-CM

## 2022-05-24 DIAGNOSIS — F51.01 PRIMARY INSOMNIA: ICD-10-CM

## 2022-05-24 LAB
ALBUMIN SERPL-MCNC: 3.3 G/DL (ref 3.5–5)
ALBUMIN/GLOB SERPL: 1 {RATIO} (ref 1.1–2.2)
ALP SERPL-CCNC: 86 U/L (ref 45–117)
ALT SERPL-CCNC: 66 U/L (ref 12–78)
ANION GAP SERPL CALC-SCNC: 11 MMOL/L (ref 5–15)
APPEARANCE UR: CLEAR
AST SERPL-CCNC: 37 U/L (ref 15–37)
ATRIAL RATE: 93 BPM
BACTERIA URNS QL MICRO: NEGATIVE /HPF
BASOPHILS # BLD: 0.1 K/UL (ref 0–0.1)
BASOPHILS NFR BLD: 1 % (ref 0–1)
BILIRUB SERPL-MCNC: 0.6 MG/DL (ref 0.2–1)
BILIRUB UR QL: NEGATIVE
BNP SERPL-MCNC: 116 PG/ML (ref 0–450)
BUN SERPL-MCNC: 15 MG/DL (ref 6–20)
BUN/CREAT SERPL: 15 (ref 12–20)
CALCIUM SERPL-MCNC: 9.2 MG/DL (ref 8.5–10.1)
CALCULATED P AXIS, ECG09: 42 DEGREES
CALCULATED R AXIS, ECG10: -19 DEGREES
CALCULATED T AXIS, ECG11: 17 DEGREES
CHLORIDE SERPL-SCNC: 103 MMOL/L (ref 97–108)
CO2 SERPL-SCNC: 26 MMOL/L (ref 21–32)
COLOR UR: ABNORMAL
CREAT SERPL-MCNC: 1 MG/DL (ref 0.55–1.02)
DIAGNOSIS, 93000: NORMAL
DIFFERENTIAL METHOD BLD: ABNORMAL
EOSINOPHIL # BLD: 0.1 K/UL (ref 0–0.4)
EOSINOPHIL NFR BLD: 1 % (ref 0–7)
EPITH CASTS URNS QL MICRO: ABNORMAL /LPF
ERYTHROCYTE [DISTWIDTH] IN BLOOD BY AUTOMATED COUNT: 12.7 % (ref 11.5–14.5)
GLOBULIN SER CALC-MCNC: 3.4 G/DL (ref 2–4)
GLUCOSE SERPL-MCNC: 97 MG/DL (ref 65–100)
GLUCOSE UR STRIP.AUTO-MCNC: NEGATIVE MG/DL
HCT VFR BLD AUTO: 39 % (ref 35–47)
HGB BLD-MCNC: 13.2 G/DL (ref 11.5–16)
HGB UR QL STRIP: NEGATIVE
IMM GRANULOCYTES # BLD AUTO: 0.1 K/UL (ref 0–0.04)
IMM GRANULOCYTES NFR BLD AUTO: 1 % (ref 0–0.5)
KETONES UR QL STRIP.AUTO: NEGATIVE MG/DL
LACTATE SERPL-SCNC: 1.7 MMOL/L (ref 0.4–2)
LEUKOCYTE ESTERASE UR QL STRIP.AUTO: ABNORMAL
LYMPHOCYTES # BLD: 1.8 K/UL (ref 0.8–3.5)
LYMPHOCYTES NFR BLD: 16 % (ref 12–49)
MCH RBC QN AUTO: 34.1 PG (ref 26–34)
MCHC RBC AUTO-ENTMCNC: 33.8 G/DL (ref 30–36.5)
MCV RBC AUTO: 100.8 FL (ref 80–99)
MONOCYTES # BLD: 0.9 K/UL (ref 0–1)
MONOCYTES NFR BLD: 8 % (ref 5–13)
NEUTS SEG # BLD: 8.7 K/UL (ref 1.8–8)
NEUTS SEG NFR BLD: 73 % (ref 32–75)
NITRITE UR QL STRIP.AUTO: NEGATIVE
NRBC # BLD: 0 K/UL (ref 0–0.01)
NRBC BLD-RTO: 0 PER 100 WBC
P-R INTERVAL, ECG05: 128 MS
PH UR STRIP: 7.5 [PH] (ref 5–8)
PLATELET # BLD AUTO: 276 K/UL (ref 150–400)
PMV BLD AUTO: 8.7 FL (ref 8.9–12.9)
POTASSIUM SERPL-SCNC: 4.3 MMOL/L (ref 3.5–5.1)
PROT SERPL-MCNC: 6.7 G/DL (ref 6.4–8.2)
PROT UR STRIP-MCNC: NEGATIVE MG/DL
Q-T INTERVAL, ECG07: 336 MS
QRS DURATION, ECG06: 78 MS
QTC CALCULATION (BEZET), ECG08: 417 MS
RBC # BLD AUTO: 3.87 M/UL (ref 3.8–5.2)
RBC #/AREA URNS HPF: ABNORMAL /HPF (ref 0–5)
SODIUM SERPL-SCNC: 140 MMOL/L (ref 136–145)
SP GR UR REFRACTOMETRY: 1.02 (ref 1–1.03)
TROPONIN-HIGH SENSITIVITY: 7 NG/L (ref 0–51)
UA: UC IF INDICATED,UAUC: ABNORMAL
UROBILINOGEN UR QL STRIP.AUTO: 0.2 EU/DL (ref 0.2–1)
VENTRICULAR RATE, ECG03: 93 BPM
WBC # BLD AUTO: 11.6 K/UL (ref 3.6–11)
WBC URNS QL MICRO: ABNORMAL /HPF (ref 0–4)

## 2022-05-24 PROCEDURE — 87040 BLOOD CULTURE FOR BACTERIA: CPT

## 2022-05-24 PROCEDURE — 83605 ASSAY OF LACTIC ACID: CPT

## 2022-05-24 PROCEDURE — 80053 COMPREHEN METABOLIC PANEL: CPT

## 2022-05-24 PROCEDURE — 94640 AIRWAY INHALATION TREATMENT: CPT

## 2022-05-24 PROCEDURE — 81001 URINALYSIS AUTO W/SCOPE: CPT

## 2022-05-24 PROCEDURE — 74011000636 HC RX REV CODE- 636: Performed by: EMERGENCY MEDICINE

## 2022-05-24 PROCEDURE — 77010033678 HC OXYGEN DAILY

## 2022-05-24 PROCEDURE — 99285 EMERGENCY DEPT VISIT HI MDM: CPT

## 2022-05-24 PROCEDURE — 74011000250 HC RX REV CODE- 250: Performed by: EMERGENCY MEDICINE

## 2022-05-24 PROCEDURE — 74011000258 HC RX REV CODE- 258: Performed by: EMERGENCY MEDICINE

## 2022-05-24 PROCEDURE — 74011250637 HC RX REV CODE- 250/637: Performed by: INTERNAL MEDICINE

## 2022-05-24 PROCEDURE — 74011000250 HC RX REV CODE- 250: Performed by: INTERNAL MEDICINE

## 2022-05-24 PROCEDURE — 83880 ASSAY OF NATRIURETIC PEPTIDE: CPT

## 2022-05-24 PROCEDURE — 71045 X-RAY EXAM CHEST 1 VIEW: CPT

## 2022-05-24 PROCEDURE — 93005 ELECTROCARDIOGRAM TRACING: CPT

## 2022-05-24 PROCEDURE — 96375 TX/PRO/DX INJ NEW DRUG ADDON: CPT

## 2022-05-24 PROCEDURE — 96367 TX/PROPH/DG ADDL SEQ IV INF: CPT

## 2022-05-24 PROCEDURE — 74011250636 HC RX REV CODE- 250/636: Performed by: EMERGENCY MEDICINE

## 2022-05-24 PROCEDURE — 71275 CT ANGIOGRAPHY CHEST: CPT

## 2022-05-24 PROCEDURE — 65270000046 HC RM TELEMETRY

## 2022-05-24 PROCEDURE — 85025 COMPLETE CBC W/AUTO DIFF WBC: CPT

## 2022-05-24 PROCEDURE — 36415 COLL VENOUS BLD VENIPUNCTURE: CPT

## 2022-05-24 PROCEDURE — 84484 ASSAY OF TROPONIN QUANT: CPT

## 2022-05-24 PROCEDURE — 74011250636 HC RX REV CODE- 250/636: Performed by: INTERNAL MEDICINE

## 2022-05-24 PROCEDURE — 96365 THER/PROPH/DIAG IV INF INIT: CPT

## 2022-05-24 PROCEDURE — 94761 N-INVAS EAR/PLS OXIMETRY MLT: CPT

## 2022-05-24 RX ORDER — LOSARTAN POTASSIUM 100 MG/1
100 TABLET ORAL DAILY
Status: DISCONTINUED | OUTPATIENT
Start: 2022-05-25 | End: 2022-05-27 | Stop reason: HOSPADM

## 2022-05-24 RX ORDER — IPRATROPIUM BROMIDE AND ALBUTEROL SULFATE 2.5; .5 MG/3ML; MG/3ML
3 SOLUTION RESPIRATORY (INHALATION)
Status: DISCONTINUED | OUTPATIENT
Start: 2022-05-24 | End: 2022-05-25

## 2022-05-24 RX ORDER — ACETAMINOPHEN 325 MG/1
650 TABLET ORAL
Status: DISCONTINUED | OUTPATIENT
Start: 2022-05-24 | End: 2022-05-27 | Stop reason: HOSPADM

## 2022-05-24 RX ORDER — FAMOTIDINE 20 MG/1
20 TABLET, FILM COATED ORAL EVERY EVENING
Status: DISCONTINUED | OUTPATIENT
Start: 2022-05-24 | End: 2022-05-27 | Stop reason: HOSPADM

## 2022-05-24 RX ORDER — POLYETHYLENE GLYCOL 3350 17 G/17G
17 POWDER, FOR SOLUTION ORAL DAILY PRN
Status: DISCONTINUED | OUTPATIENT
Start: 2022-05-24 | End: 2022-05-27 | Stop reason: HOSPADM

## 2022-05-24 RX ORDER — IPRATROPIUM BROMIDE AND ALBUTEROL SULFATE 2.5; .5 MG/3ML; MG/3ML
3 SOLUTION RESPIRATORY (INHALATION)
Status: COMPLETED | OUTPATIENT
Start: 2022-05-24 | End: 2022-05-24

## 2022-05-24 RX ORDER — ONDANSETRON 2 MG/ML
4 INJECTION INTRAMUSCULAR; INTRAVENOUS
Status: DISCONTINUED | OUTPATIENT
Start: 2022-05-24 | End: 2022-05-27 | Stop reason: HOSPADM

## 2022-05-24 RX ORDER — LORAZEPAM 2 MG/ML
0.5 INJECTION INTRAMUSCULAR
Status: COMPLETED | OUTPATIENT
Start: 2022-05-24 | End: 2022-05-24

## 2022-05-24 RX ORDER — LORAZEPAM 0.5 MG/1
0.5 TABLET ORAL
Status: DISCONTINUED | OUTPATIENT
Start: 2022-05-24 | End: 2022-05-27 | Stop reason: HOSPADM

## 2022-05-24 RX ORDER — SODIUM CHLORIDE 0.9 % (FLUSH) 0.9 %
5-40 SYRINGE (ML) INJECTION AS NEEDED
Status: DISCONTINUED | OUTPATIENT
Start: 2022-05-24 | End: 2022-05-27 | Stop reason: HOSPADM

## 2022-05-24 RX ORDER — PANTOPRAZOLE SODIUM 40 MG/1
40 TABLET, DELAYED RELEASE ORAL
Status: DISCONTINUED | OUTPATIENT
Start: 2022-05-25 | End: 2022-05-27 | Stop reason: HOSPADM

## 2022-05-24 RX ORDER — ATORVASTATIN CALCIUM 40 MG/1
40 TABLET, FILM COATED ORAL
Status: DISCONTINUED | OUTPATIENT
Start: 2022-05-24 | End: 2022-05-27 | Stop reason: HOSPADM

## 2022-05-24 RX ORDER — MONTELUKAST SODIUM 10 MG/1
10 TABLET ORAL
Status: DISCONTINUED | OUTPATIENT
Start: 2022-05-24 | End: 2022-05-27 | Stop reason: HOSPADM

## 2022-05-24 RX ORDER — MINERAL OIL
180 ENEMA (ML) RECTAL DAILY
COMMUNITY

## 2022-05-24 RX ORDER — ENOXAPARIN SODIUM 100 MG/ML
40 INJECTION SUBCUTANEOUS DAILY
Status: DISCONTINUED | OUTPATIENT
Start: 2022-05-25 | End: 2022-05-27 | Stop reason: HOSPADM

## 2022-05-24 RX ORDER — ONDANSETRON 4 MG/1
4 TABLET, ORALLY DISINTEGRATING ORAL
Status: DISCONTINUED | OUTPATIENT
Start: 2022-05-24 | End: 2022-05-27 | Stop reason: HOSPADM

## 2022-05-24 RX ORDER — HYDROCODONE POLISTIREX AND CHLORPHENIRAMINE POLISTIREX 10; 8 MG/5ML; MG/5ML
5 SUSPENSION, EXTENDED RELEASE ORAL
Status: DISCONTINUED | OUTPATIENT
Start: 2022-05-24 | End: 2022-05-25

## 2022-05-24 RX ORDER — GUAIFENESIN 600 MG/1
600 TABLET, EXTENDED RELEASE ORAL 2 TIMES DAILY
Status: DISCONTINUED | OUTPATIENT
Start: 2022-05-24 | End: 2022-05-27 | Stop reason: HOSPADM

## 2022-05-24 RX ORDER — SODIUM CHLORIDE 0.9 % (FLUSH) 0.9 %
5-10 SYRINGE (ML) INJECTION AS NEEDED
Status: DISCONTINUED | OUTPATIENT
Start: 2022-05-24 | End: 2022-05-27 | Stop reason: HOSPADM

## 2022-05-24 RX ORDER — SODIUM CHLORIDE 0.9 % (FLUSH) 0.9 %
5-40 SYRINGE (ML) INJECTION EVERY 8 HOURS
Status: DISCONTINUED | OUTPATIENT
Start: 2022-05-24 | End: 2022-05-27 | Stop reason: HOSPADM

## 2022-05-24 RX ORDER — FLUTICASONE PROPIONATE 50 MCG
2 SPRAY, SUSPENSION (ML) NASAL DAILY
Status: DISCONTINUED | OUTPATIENT
Start: 2022-05-25 | End: 2022-05-27 | Stop reason: HOSPADM

## 2022-05-24 RX ADMIN — IPRATROPIUM BROMIDE AND ALBUTEROL SULFATE 3 ML: 2.5; .5 SOLUTION RESPIRATORY (INHALATION) at 12:04

## 2022-05-24 RX ADMIN — LORAZEPAM 0.5 MG: 2 INJECTION INTRAMUSCULAR; INTRAVENOUS at 15:04

## 2022-05-24 RX ADMIN — AZITHROMYCIN DIHYDRATE 500 MG: 500 INJECTION, POWDER, LYOPHILIZED, FOR SOLUTION INTRAVENOUS at 11:08

## 2022-05-24 RX ADMIN — ARFORMOTEROL TARTRATE: 15 SOLUTION RESPIRATORY (INHALATION) at 20:48

## 2022-05-24 RX ADMIN — ATORVASTATIN CALCIUM 40 MG: 40 TABLET, FILM COATED ORAL at 22:04

## 2022-05-24 RX ADMIN — HYDROCODONE POLISTIREX AND CHLORPHENIRAMINE POLISTIREX 5 ML: 10; 8 SUSPENSION, EXTENDED RELEASE ORAL at 22:04

## 2022-05-24 RX ADMIN — IPRATROPIUM BROMIDE AND ALBUTEROL SULFATE 3 ML: .5; 3 SOLUTION RESPIRATORY (INHALATION) at 20:48

## 2022-05-24 RX ADMIN — SODIUM CHLORIDE, PRESERVATIVE FREE 10 ML: 5 INJECTION INTRAVENOUS at 22:04

## 2022-05-24 RX ADMIN — SODIUM CHLORIDE 1503 ML: 9 INJECTION, SOLUTION INTRAVENOUS at 11:10

## 2022-05-24 RX ADMIN — METHYLPREDNISOLONE SODIUM SUCCINATE 125 MG: 125 INJECTION, POWDER, FOR SOLUTION INTRAMUSCULAR; INTRAVENOUS at 11:06

## 2022-05-24 RX ADMIN — CEFTRIAXONE SODIUM 2 G: 2 INJECTION, POWDER, FOR SOLUTION INTRAMUSCULAR; INTRAVENOUS at 13:17

## 2022-05-24 RX ADMIN — FAMOTIDINE 20 MG: 20 TABLET, FILM COATED ORAL at 18:22

## 2022-05-24 RX ADMIN — IOPAMIDOL 100 ML: 755 INJECTION, SOLUTION INTRAVENOUS at 13:07

## 2022-05-24 RX ADMIN — SODIUM CHLORIDE, PRESERVATIVE FREE 10 ML: 5 INJECTION INTRAVENOUS at 18:24

## 2022-05-24 RX ADMIN — GUAIFENESIN 600 MG: 600 TABLET, EXTENDED RELEASE ORAL at 18:22

## 2022-05-24 RX ADMIN — IPRATROPIUM BROMIDE AND ALBUTEROL SULFATE 3 ML: .5; 3 SOLUTION RESPIRATORY (INHALATION) at 11:10

## 2022-05-24 RX ADMIN — METHYLPREDNISOLONE SODIUM SUCCINATE 40 MG: 40 INJECTION, POWDER, FOR SOLUTION INTRAMUSCULAR; INTRAVENOUS at 18:21

## 2022-05-24 RX ADMIN — METHYLPREDNISOLONE SODIUM SUCCINATE 40 MG: 40 INJECTION, POWDER, FOR SOLUTION INTRAMUSCULAR; INTRAVENOUS at 22:04

## 2022-05-24 RX ADMIN — MONTELUKAST 10 MG: 10 TABLET, FILM COATED ORAL at 22:04

## 2022-05-24 NOTE — RT PROTOCOL NOTE
RT Inhaler-Nebulizer Bronchodilator Protocol Note    There is a bronchodilator order in the chart from a provider indicating to follow the RT Bronchodilator Protocol and there is an Initiate RT Bronchodilator Protocol order as well (see protocol at bottom of note). CXR Findings:  Recent Results (from the past 2 days)    XR CHEST PORT 05/24/2022    Impression  Evidence of very subtle increased density involving the right upper  lobe. Presence of minimal left basilar linear atelectasis. A follow-up chest  examination is recommended. The findings from the last RT Protocol Assessment were as follows:  History of Pulmonary Disease: Chronic pulmonary disease  Respiratory Pattern: Mild dyspnea at rest, irregular pattern, or RR 21-25 bpm  Breath Sounds: Inspiratory and expiratory or bilateral wheezing and/or rhonchi  Cough: Strong, productive  Indication for Bronchodilator Therapy: On home bronchodilators,Wheezing associated with pulmonary disorder,Decreased or absent breath sounds  Bronchodilator Assessment Score: 13    Aerosolized bronchodilator medication orders have been revised according to the RT Inhaler-Nebulizer Bronchodilator Protocol below. Respiratory Therapist to perform RT Therapy Protocol Assessment initially then follow the protocol. Repeat RT Therapy Protocol Assessment PRN for score 0-3 or on second treatment, BID, and PRN for scores above 3. No Indications - adjust the frequency to every 6 hours PRN wheezing or bronchospasm, if no treatments needed after 48 hours then discontinue using Per Protocol order mode. If indication present, adjust the RT bronchodilator orders based on the Bronchodilator Assessment Score as indicated below.   Use Inhaler orders unless patient has one or more of the following: on home nebulizer, not able to hold breath for 10 seconds, is not alert and oriented, cannot activate and use MDI correctly, or respiratory rate 25 breaths per minute or more, then use the equivalent nebulizer order(s) with same Frequency and PRN reasons based on the score. If a patient is on this medication at home then do not decrease Frequency below that used at home. 0-3 - enter or revise RT bronchodilator order(s) to equivalent RT Bronchodilator order with Frequency of every 4 hours PRN for wheezing or increased work of breathing using Per Protocol order mode. 4-6 - enter or revise RT Bronchodilator order(s) to two equivalent RT bronchodilator orders with one order with BID Frequency and one order with Frequency of every 4 hours PRN wheezing or increased work of breathing using Per Protocol order mode. 7-10 - enter or revise RT Bronchodilator order(s) to two equivalent RT bronchodilator orders with one order with TID Frequency and one order with Frequency of every 4 hours PRN wheezing or increased work of breathing using Per Protocol order mode. 11-13 - enter or revise RT Bronchodilator order(s) to one equivalent RT bronchodilator order with QID Frequency and an Albuterol order with Frequency of every 4 hours PRN wheezing or increased work of breathing using Per Protocol order mode. Greater than 13 - enter or revise RT Bronchodilator order(s) to one equivalent RT bronchodilator order with every 4 hours Frequency and an Albuterol order with Frequency of every 2 hours PRN wheezing or increased work of breathing using Per Protocol order mode. RT to enter RT Home Evaluation for COPD & MDI Assessment order using Per Protocol order mode.     Electronically signed by Colletta Penna, RT on 5/24/2022 at 5:51 PM

## 2022-05-24 NOTE — ROUTINE PROCESS
Bedside and Verbal shift change report given to TIM Lomas RN (oncoming nurse) by Randy Clemente RN (offgoing nurse). Report included the following information SBAR, ED Summary, MAR and Recent Results. Witt score 0  Bed/chair alarm is not in use. If in use it is set at the highest volume. SL  Patient Vitals for the past 12 hrs:   Temp Pulse Resp BP SpO2   05/24/22 1801 97.7 °F (36.5 °C) (!) 113 22 (!) 159/88 94 %   05/24/22 1730 -- (!) 110 -- -- --   05/24/22 1700 98 °F (36.7 °C) -- -- -- --   05/24/22 1620 -- 89 -- (!) 140/74 99 %   05/24/22 1440 -- 81 22 (!) 161/83 94 %   05/24/22 1204 -- -- -- -- 95 %   05/24/22 1111 -- -- -- -- 95 %   05/24/22 1012 98.4 °F (36.9 °C) 98 24 (!) 151/83 100 %     No flowsheet data found. All lab results for the last 24 hours reviewed.

## 2022-05-24 NOTE — Clinical Note
Status[de-identified] INPATIENT [101]   Type of Bed: Telemetry [19]   Cardiac Monitoring Required?: Yes   Inpatient Hospitalization Certified Necessary for the Following Reasons: 9.  Other (further clarification in H&P documentation)   Admitting Diagnosis: COPD with acute exacerbation [facility]) [[id number]]   Admitting Physician: Bianca Vázquez [[id number]]   Attending Physician: Bianca Vázquez [[id number]]   Estimated Length of Stay: < 96 Hours   Discharge Plan[de-identified] Home with Office Follow-up

## 2022-05-24 NOTE — ED PROVIDER NOTES
EMERGENCY DEPARTMENT HISTORY AND PHYSICAL EXAM          Date: 5/24/2022  Patient Name: Aviva Gandhi    History of Presenting Illness     Chief Complaint   Patient presents with    Cough       History Provided By: Patient    HPI: Aviva Gandhi is a 65 y.o. female, pmhx COPD and GERD, who presents via private auto to the ED c/o cough    She was seen in the emergency room last week and diagnosed with a URI. Today her  called her primary care doctor as she is not getting any better. While on the phone they had them check her vital signs and her oxygen was 92% with a heart rate of 124 so they recommended she come back to the ER. Patient states she has not felt any better since taking the medications when she was here earlier. She states she is not eating or drinking and she is so short of breath she cannot sleep. She notes she did not have any time to take a nebulizer treatment today but she used her nebulizer 4 times yesterday with her MDI multiple times and did not feel like she was getting any air. She states she feels exhausted. Patient specifically denies any fevers, chills, nausea, vomiting, diarrhea, abd pain, changes in BM, or headache. PCP: Mariya Haynes MD    Allergies: Multiple  Social Hx: -tobacco, -vaping, +EtOH, -Illicit Drugs; There are no other complaints, changes, or physical findings at this time.      Current Facility-Administered Medications   Medication Dose Route Frequency Provider Last Rate Last Admin    sodium chloride (NS) flush 5-10 mL  5-10 mL IntraVENous PRN Clark Fernandez MD        cefTRIAXone (ROCEPHIN) 2 g in 0.9% sodium chloride (MBP/ADV) 50 mL MBP  2 g IntraVENous Q24H Clark Fernandez MD   IV Completed at 05/24/22 1345    azithromycin (ZITHROMAX) 500 mg in 0.9% sodium chloride 250 mL ([id number])  500 mg IntraVENous Q24H Clark Fernandez MD   IV Completed at 05/24/22 1200    acetaminophen (TYLENOL) tablet 650 mg  650 mg Oral Q6H PRN Sunshine Arauz MD         Current Outpatient Medications   Medication Sig Dispense Refill    predniSONE (STERAPRED DS) 10 mg dose pack Take 2 days at each daily dosage of 30, 20, 10, and 5 mg. 30 Tablet 0    LORazepam (ATIVAN) 0.5 mg tablet Take 1 Tablet by mouth every six (6) hours as needed for Anxiety. Max Daily Amount: 2 mg. 8 Tablet 0    guaiFENesin ER (Mucinex) 600 mg ER tablet Take 600 mg by mouth.  losartan (COZAAR) 50 mg tablet Take 50 mg by mouth daily. 2 tabs per day      albuterol (PROVENTIL HFA, VENTOLIN HFA, PROAIR HFA) 90 mcg/actuation inhaler Take 2 Puffs by inhalation every four (4) hours as needed.  dilTIAZem ER (CARDIZEM CD) 180 mg capsule  (Patient not taking: Reported on 7/18/2021)      esomeprazole (NEXIUM) 20 mg capsule Take 20 mg by mouth.  Daliresp 500 mcg tab tablet TAKE 1/2 TABLET BY MOUTH DAILY FOR 30 DAYS,THEN TAKE 1 TAB DAILY (Patient not taking: Reported on 7/18/2021)      albuterol-ipratropium (DUO-NEB) 2.5 mg-0.5 mg/3 ml nebu 3 mL by Nebulization route every four (4) hours as needed for Wheezing. 30 Nebule 1    azithromycin (Zithromax Z-Tavares) 250 mg tablet 2 tabs today and then 1 tab daily until completed (Patient not taking: Reported on 7/18/2021) 6 Tab 0    levoFLOXacin (LEVAQUIN) 750 mg tablet Take 1 Tab by mouth daily. Indications: pneumonia (Patient not taking: Reported on 7/18/2021) 5 Tab 0    sodium chloride 3 % nebulizer solution TAKE 4 ML BY NEBULIZATION DAILY      potassium chloride SR (K-TAB) 20 mEq tablet TAKE 1 TABLET BY MOUTH ONCE DAILY FOR LOW POTASSIUM      Tobradex ophthalmic ointment APPLY TO EACH EYE AT BEDTIME AS NEEDED      Oxygen 1 Each by Nasal route daily as needed.  Uses one liter of oxygen as needed      amLODIPine (NORVASC) 5 mg tablet Indications: High Blood Pressure Disorder 1 pill daily for blood pressure (Patient not taking: Reported on 7/18/2021)      valACYclovir (VALTREX) 500 mg tablet 1 pill twice daily for 5 days if needed for outbreak      montelukast (SINGULAIR) 10 mg tablet Take 10 mg by mouth At bedtime.  atorvastatin (LIPITOR) 10 mg tablet Take 20 mg by mouth daily.  multivitamin (MULTIPLE VITAMINS PO) Take  by mouth.  loratadine (CLARITIN) 10 mg tablet Take 1 Tab by mouth daily. Indications: ALLERGIC RHINITIS (Patient not taking: Reported on 2021) 30 Tab 4    fluticasone (FLONASE) 50 mcg/actuation nasal spray One spray per nostril twice a day  Indications: ALLERGIC RHINITIS 1 Bottle 5    naproxen sodium (ALEVE) 220 mg cap Take  by mouth. Past History     Past Medical History:  Past Medical History:   Diagnosis Date    Chronic obstructive pulmonary disease ([address]     Environmental and seasonal allergies     GERD (gastroesophageal reflux disease)     Herpes     Genital    Hx of colonoscopy 2017    Hypertension     OA (osteoarthritis)        Past Surgical History:  Past Surgical History:   Procedure Laterality Date    HX BREAST AUGMENTATION      HX CATARACT REMOVAL Bilateral     HX COLONOSCOPY      [facility]    HX COLONOSCOPY  2017    nl    HX ORTHOPAEDIC Left 10/14/2016    left foot planter fibroma    HX ORTHOPAEDIC      right hand fibroma    HX TUBAL LIGATION      IMPLANT BREAST SILICONE/EQ         Family History:  Family History   Problem Relation Age of Onset    Heart Disease Father     Hypertension Mother     Liver Disease Brother         Hepatitis C    Heart Failure Brother        Social History:  Social History     Tobacco Use    Smoking status: Former Smoker     Quit date: 1995     Years since quittin.0    Smokeless tobacco: Never Used   Substance Use Topics    Alcohol use: Yes     Alcohol/week: 63.0 standard drinks     Types: 21 Glasses of wine, 42 Shots of liquor per week     Comment: 2 drinks per night    Drug use: No       Allergies:   Allergies   Allergen Reactions    Daliresp [Roflumilast] Diarrhea    Levofloxacin Diarrhea Diarrhea and stomach cramps    Maxzide [Triamterene-Hydrochlorothiazid] Rash    Pcn [Penicillins] Unknown (comments)         Review of Systems   Review of Systems   Constitutional: Negative for activity change, appetite change, chills, fever and unexpected weight change. HENT: Negative for congestion. Eyes: Negative for pain and visual disturbance. Respiratory: Positive for cough. Negative for shortness of breath. Cardiovascular: Negative for chest pain. Gastrointestinal: Negative for abdominal pain, diarrhea, nausea and vomiting. Genitourinary: Negative for dysuria. Musculoskeletal: Negative for back pain. Skin: Negative for rash. Neurological: Negative for headaches. Physical Exam   Physical Exam  Vitals and nursing note reviewed. Constitutional:       Appearance: She is well-developed. She is not diaphoretic. Comments: This is an elderly female who appears uncomfortable in mild to moderate respiratory distress   HENT:      Head: Normocephalic and atraumatic. Eyes:      General:         Right eye: No discharge. Left eye: No discharge. Conjunctiva/sclera: Conjunctivae normal.      Pupils: Pupils are equal, round, and reactive to light. Cardiovascular:      Rate and Rhythm: Regular rhythm. Tachycardia present. Heart sounds: Normal heart sounds. No murmur heard. Pulmonary:      Effort: Respiratory distress present. Breath sounds: No stridor. Wheezing present. No rhonchi or rales. Abdominal:      General: Bowel sounds are normal. There is no distension. Palpations: Abdomen is soft. Tenderness: There is no abdominal tenderness. There is no guarding or rebound. Musculoskeletal:         General: Normal range of motion. Cervical back: Normal range of motion and neck supple. Right lower leg: No edema. Left lower leg: No edema. Skin:     General: Skin is warm and dry. Coloration: Skin is not pale.       Findings: No erythema or rash. Neurological:      Mental Status: She is alert and oriented to person, place, and time. Cranial Nerves: No cranial nerve deficit. Motor: No abnormal muscle tone. Diagnostic Study Results     Labs -     Recent Results (from the past 12 hour(s))   EKG, 12 LEAD, INITIAL    Collection Time: 05/24/22 10:23 AM   Result Value Ref Range    Ventricular Rate 93 BPM    Atrial Rate 93 BPM    P-R Interval 128 ms    QRS Duration 78 ms    Q-T Interval 336 ms    QTC Calculation (Bezet) 417 ms    Calculated P Axis 42 degrees    Calculated R Axis -19 degrees    Calculated T Axis 17 degrees    Diagnosis       Normal sinus rhythm  Normal ECG  When compared with ECG of 13-MAY-2022 13:05,  No significant change was found  Confirmed by Stanley Espinoza MD, Chris Garcia ([id number]) on 5/24/2022 4:21:40 PM     LACTIC ACID    Collection Time: 05/24/22 10:49 AM   Result Value Ref Range    Lactic acid 1.7 0.4 - 2.0 MMOL/L   METABOLIC PANEL, COMPREHENSIVE    Collection Time: 05/24/22 10:49 AM   Result Value Ref Range    Sodium 140 136 - 145 mmol/L    Potassium 4.3 3.5 - 5.1 mmol/L    Chloride 103 97 - 108 mmol/L    CO2 26 21 - 32 mmol/L    Anion gap 11 5 - 15 mmol/L    Glucose 97 65 - 100 mg/dL    BUN 15 6 - 20 MG/DL    Creatinine 1.00 0.55 - 1.02 MG/DL    BUN/Creatinine ratio 15 12 - 20      GFR est AA >60 >60 ml/min/1.73m2    GFR est non-AA 54 (L) >60 ml/min/1.73m2    Calcium 9.2 8.5 - 10.1 MG/DL    Bilirubin, total 0.6 0.2 - 1.0 MG/DL    ALT (SGPT) 66 12 - 78 U/L    AST (SGOT) 37 15 - 37 U/L    Alk.  phosphatase 86 45 - 117 U/L    Protein, total 6.7 6.4 - 8.2 g/dL    Albumin 3.3 (L) 3.5 - 5.0 g/dL    Globulin 3.4 2.0 - 4.0 g/dL    A-G Ratio 1.0 (L) 1.1 - 2.2     CBC WITH AUTOMATED DIFF    Collection Time: 05/24/22 10:49 AM   Result Value Ref Range    WBC 11.6 (H) 3.6 - 11.0 K/uL    RBC 3.87 3.80 - 5.20 M/uL    HGB 13.2 11.5 - 16.0 g/dL    HCT 39.0 35.0 - 47.0 %    .8 (H) 80.0 - 99.0 FL    MCH 34.1 (H) 26.0 - 34.0 PG MCHC 33.8 30.0 - 36.5 g/dL    RDW 12.7 11.5 - 14.5 %    PLATELET 869 132 - 880 K/uL    MPV 8.7 (L) 8.9 - 12.9 FL    NRBC 0.0 0  WBC    ABSOLUTE NRBC 0.00 0.00 - 0.01 K/uL    NEUTROPHILS 73 32 - 75 %    LYMPHOCYTES 16 12 - 49 %    MONOCYTES 8 5 - 13 %    EOSINOPHILS 1 0 - 7 %    BASOPHILS 1 0 - 1 %    IMMATURE GRANULOCYTES 1 (H) 0.0 - 0.5 %    ABS. NEUTROPHILS 8.7 (H) 1.8 - 8.0 K/UL    ABS. LYMPHOCYTES 1.8 0.8 - 3.5 K/UL    ABS. MONOCYTES 0.9 0.0 - 1.0 K/UL    ABS. EOSINOPHILS 0.1 0.0 - 0.4 K/UL    ABS. BASOPHILS 0.1 0.0 - 0.1 K/UL    ABS. IMM. GRANS. 0.1 (H) 0.00 - 0.04 K/UL    DF AUTOMATED     TROPONIN-HIGH SENSITIVITY    Collection Time: 05/24/22 10:49 AM   Result Value Ref Range    Troponin-High Sensitivity 7 0 - 51 ng/L   NT-PRO BNP    Collection Time: 05/24/22 10:49 AM   Result Value Ref Range    NT pro- 0 - 450 PG/ML   URINALYSIS W/ REFLEX CULTURE    Collection Time: 05/24/22  1:52 PM    Specimen: Urine   Result Value Ref Range    Color YELLOW/STRAW      Appearance CLEAR CLEAR      Specific gravity 1.020 1.003 - 1.030      pH (UA) 7.5 5.0 - 8.0      Protein Negative NEG mg/dL    Glucose Negative NEG mg/dL    Ketone Negative NEG mg/dL    Bilirubin Negative NEG      Blood Negative NEG      Urobilinogen 0.2 0.2 - 1.0 EU/dL    Nitrites Negative NEG      Leukocyte Esterase SMALL (A) NEG      WBC 0-4 0 - 4 /hpf    RBC 0-5 0 - 5 /hpf    Epithelial cells FEW FEW /lpf    Bacteria Negative NEG /hpf    UA:UC IF INDICATED CULTURE NOT INDICATED BY UA RESULT CNI         Radiologic Studies -   CTA CHEST W OR W WO CONT   Final Result   1. No evidence of pulmonary embolus or right heart strain. 2.  Severe emphysema with possible mild pulmonary edema. XR CHEST PORT   Final Result   Evidence of very subtle increased density involving the right upper   lobe. Presence of minimal left basilar linear atelectasis. A follow-up chest   examination is recommended.         CT Results  (Last 48 hours) 05/24/22 1306  CTA [facility] CONT Final result    Impression:  1. No evidence of pulmonary embolus or right heart strain. 2.  Severe emphysema with possible mild pulmonary edema. Narrative:  EXAM:  CTA CHEST W OR W WO CONT       INDICATION: Shortness of breath       COMPARISON: Chest CTA from 4/2/2022       TECHNIQUE: Helical thin section chest CT following uneventful intravenous   administration of nonionic contrast 100 mL of isovue 370 according to   departmental PE protocol. Coronal and sagittal reformats were performed. 3D post   processing was performed. CT dose reduction was achieved through the use of a   standardized protocol tailored for this examination and automatic exposure   control for dose modulation. FINDINGS: This is a good quality study for the evaluation of pulmonary embolism   to the first subsegmental arterial level. There is no pulmonary embolism to this   level. THYROID: No nodule. MEDIASTINUM: No mass or lymphadenopathy. TAMMIE: No mass or lymphadenopathy. THORACIC AORTA: No aneurysm. HEART: Cardiomegaly. Mild-moderate coronary calcifications. ESOPHAGUS: No wall thickening or dilatation. TRACHEA/BRONCHI: Patent. PLEURA: No effusion or pneumothorax. LUNGS: Severe emphysema. No suspicious nodules, mass. Mild increased septal   thickening groundglass opacification. UPPER ABDOMEN: Hepatic steatosis. Focal hepatic masses. BONES: No aggressive bone lesion or fracture. Scattered thoracic spondylosis   with kyphotic abnormality. Bilateral breast augmentation. CXR Results  (Last 48 hours)               05/24/22 1122  XR CHEST PORT Final result    Impression:  Evidence of very subtle increased density involving the right upper   lobe. Presence of minimal left basilar linear atelectasis. A follow-up chest   examination is recommended.        Narrative:  Portable chest single view dated 5/24/2022       Comparison chest dated 5/13/2022 History is cough and shortness of breath       A single portable AP upright view of the chest was obtained. Partially calcified   bilateral breast implants are present. There is slight prominence of the   pulmonary interstitial markings. This is best appreciated in the region of the   right upper lobe. Very subtle hazy density is noted in this region. There is   some linear atelectatic change in the lateral aspect of the left lung base. Medical Decision Making   I am the first provider for this patient. I reviewed the vital signs, available nursing notes, past medical history, past surgical history, family history and social history. Vital Signs-Reviewed the patient's vital signs. Patient Vitals for the past 12 hrs:   Temp Pulse Resp BP SpO2   05/24/22 1620 -- 89 -- (!) 140/74 99 %   05/24/22 1440 -- 81 22 (!) 161/83 94 %   05/24/22 1204 -- -- -- -- 95 %   05/24/22 1111 -- -- -- -- 95 %   05/24/22 1012 98.4 °F (36.9 °C) 98 24 (!) 151/83 100 %       Pulse Oximetry Analysis - 100% on RA    Records Reviewed: Nursing Notes, Old Medical Records, Previous Radiology Studies and Previous Laboratory Studies    Provider Notes (Medical Decision Making):   MDM: Elderly female with a history of COPD presenting with almost 2 weeks of shortness of breath and cough not relieved by a course of steroids. Currently hemodynamically stable but tachypneic and tachycardic; sepsis order set initiated. Patient denies any chest pain and she does not have any lower extremity edema concerning for DVTs to lower suspicion for PE. Symptomatic medications to be provided while awaiting lab results. ED Course:   Initial assessment performed. The patients presenting problems have been discussed, and they are in agreement with the care plan formulated and outlined with them. I have encouraged them to ask questions as they arise throughout their visit.     EKG interpretation: (Preliminary)  Rhythm: Sinus tachycardia at a rate of 93 bpm; normal [place]; normal QRS; normal QTC with left axis deviation. This EKG was interpreted by ED Provider Mame Loya MD    PROGRESS NOTE:    ED Course as of 05/24/22 1659   Tue May 24, 2022   1145 Patient reevaluated and moving air much better than earlier although she states she does not feel any better after the treatment.  is at bedside. Results reviewed. [JT]   1440 Patient reevaluated and although her lungs are now clear with good movement she has a lot of upper airway forced expiratory wheeze and is quite anxious. She states she is not any better. Explained that clinically she is doing very well but her labs are normal and that her CT does not show any evidence of pneumonia or blood clot. Her  is concerned that she is not any better and the tension in the room is quite possible. I explained that if she does not feel any better we can admit her to the hospital and I would discuss her care with the hospitalist.  She states she is concerned that she is going to go home and turn around and come right back. She states she feels very dry because I have given her too much albuterol and she cannot breathe. I placed a call to respiratory therapy to request they bring humidified oxygen for patient's comfort. Request nursing to provide her some Ativan and I discussed her case with Dr. [place] the hospitalist.  He is going to come see her in the ER to evaluate her for admission.  [JT]      ED Course User Index  [JT] Nara Fernandez MD        Critical Care Time:   CRITICAL CARE NOTE :  4:58 PM   IMPENDING DETERIORATION -Airway, Respiratory, Cardiovascular, CNS, Metabolic, Renal and Hepatic  ASSOCIATED RISK FACTORS - Hypotension, Shock, Hypoxia, Dysrhythmia, Metabolic changes and Dehydration  MANAGEMENT- Bedside Assessment, Supervision of Care and Transfer  INTERPRETATION -  Xrays, ECG and Blood Pressure  INTERVENTIONS - hemodynamic mngmt and Metobolic interventions  CASE REVIEW - Hospitalist/Intensivist, Nursing and Family  TREATMENT RESPONSE -Stable  PERFORMED BY - Self    NOTES   :  I have spent 40 minutes of critical care time involved in lab review, consultations with specialist, family decision- making, bedside attention and documentation. During this entire length of time I was immediately available to the patient. Shira Heath. MD Jim        Diagnosis     Clinical Impression:   1. Acute exacerbation of chronic obstructive pulmonary disease (COPD) ([facility] [address]        PLAN:  Admit for further management and care      Please note, this dictation was completed with [employer], the computer voice recognition software. Quite often unanticipated grammatical, syntax, homophones, and other interpretive errors are inadvertently transcribed by the computer software. Please disregard these errors. Please excuse any errors that have escaped final proof reading.

## 2022-05-24 NOTE — ROUTINE PROCESS
TRANSFER - IN REPORT:    Verbal report received from CLEM Mckeon RN(name) on [facility]  being received from Dong Carbajal RN(unit) for routine progression of care      Report consisted of patients Situation, Background, Assessment and   Recommendations(SBAR). Information from the following report(s) SBAR, ED Summary, [facility] and Recent Results was reviewed with the receiving nurse. Opportunity for questions and clarification was provided. Assessment completed upon patients arrival to unit and care assumed.

## 2022-05-24 NOTE — H&P
[facility]   Admission History & Physical        5/24/2022 4:12 PM  Patient: Pina Martínez 1945  PCP: Manjinder Jurado MD    HISTORY  Chief Complaint:   Chief Complaint   Patient presents with    Cough       HPI: 65 y.o. female presenting for admission to [facility] for further evaluation and treatment for COPD with acute exacerbation ([facility] [address] She  has a past medical history of Chronic obstructive pulmonary disease ([facility] [address] Environmental and seasonal allergies, GERD (gastroesophageal reflux disease), Herpes, colonoscopy (05/22/2017), Hypertension, and OA (osteoarthritis). She has no past medical history of Endocrine disorder. .     Patient with longstanding COPD on home oxygen presenting back to the ED with ongoing complaints of cough and shortness of breath. He was seen in the ED April 1 and prescribed tapered prednisone and as needed Ativan to continue her Anoro MDI and albuterol neb. She was seen in follow-up by Dr. Gadiel Bautista, Pulmonary [facility] and was prescribed a longer tapered course of prednisone over 10 days. She more recently presented to the ED on May 13 with recurrent symptoms and was treated for suspected viral URI with tapered prednisone beginning at 30 mg over 1 week. She has exacerbated symptoms over the past few days following the taper. She has continued the Anora and albuterol nebs. She has continued her other routine respiratory meds including Singulair, Flonase, Allegra and Mucinex. Has known peptic reflux that is treated daily with Nexium. Due to persistent symptoms she was assessed with a pulmonary angiogram today that was negative for infiltrate, mass, or PE. She has been admitted here previously in 2020 as well as 2021 for respiratory problems. She has had multiple negative COVID screens over the past 2 years-the last being at the ED visit 5/13. Has not apparently taken the COVID-vaccine. Last recorded influenza vaccine October 2020.   Pneumococcal conjugate vaccine November 2016. Past Medical History:  Past Medical History:   Diagnosis Date    Chronic obstructive pulmonary disease ([facility] [address]     Environmental and seasonal allergies     GERD (gastroesophageal reflux disease)     Herpes     Genital    Hx of colonoscopy 05/22/2017    Hypertension     OA (osteoarthritis)        Past Surgical History:  Past Surgical History:   Procedure Laterality Date    HX BREAST AUGMENTATION      HX CATARACT REMOVAL Bilateral     HX COLONOSCOPY  2005    [place] [facility]    HX COLONOSCOPY  05/22/2017    nl    HX ORTHOPAEDIC Left 10/14/2016    left foot planter fibroma    HX ORTHOPAEDIC      right hand fibroma    HX TUBAL LIGATION      IMPLANT BREAST SILICONE/EQ         Medication:  Prior to Admission medications    Medication Sig Start Date End Date Taking? Authorizing Provider   predniSONE (STERAPRED DS) 10 mg dose pack Take 2 days at each daily dosage of 30, 20, 10, and 5 mg. 5/13/22   Bentley Bryant MD   LORazepam (ATIVAN) 0.5 mg tablet Take 1 Tablet by mouth every six (6) hours as needed for Anxiety. Max Daily Amount: 2 mg. 4/2/22   Conner Mejia MD   guaiFENesin ER (Mucinex) 600 mg ER tablet Take 600 mg by mouth. Provider, Janay   losartan (COZAAR) 50 mg tablet Take 50 mg by mouth daily. 2 tabs per day    Other, MD Yessenia   albuterol (PROVENTIL HFA, VENTOLIN HFA, PROAIR HFA) 90 mcg/actuation inhaler Take 2 Puffs by inhalation every four (4) hours as needed. 8/27/20   Yessenia Fernandez MD   dilTIAZem ER (CARDIZEM CD) 180 mg capsule  4/19/21   Yessenia Fernandez MD   esomeprazole (NEXIUM) 20 mg capsule Take 20 mg by mouth. Other, MD Yessenia   Daliresp 500 mcg tab tablet TAKE 1/2 TABLET BY MOUTH DAILY FOR 30 DAYS,THEN TAKE 1 TAB DAILY  Patient not taking: Reported on 7/18/2021 2/19/21   Other, MD Yessenia   albuterol-ipratropium (DUO-NEB) 2.5 mg-0.5 mg/3 ml nebu 3 mL by Nebulization route every four (4) hours as needed for Wheezing.  4/26/21 5/28/22  Turner Pack MD CARMEN   azithromycin (Zithromax Z-Tavares) 250 mg tablet 2 tabs today and then 1 tab daily until completed  Patient not taking: Reported on 7/18/2021 4/26/21   Justyn Fernandez MD   levoFLOXacin (LEVAQUIN) 750 mg tablet Take 1 Tab by mouth daily. Indications: pneumonia  Patient not taking: Reported on 7/18/2021 12/8/20   Albina Hammans, MD   sodium chloride 3 % nebulizer solution TAKE 4 ML BY NEBULIZATION DAILY 11/20/20   Other, MD Yessenia   potassium chloride SR (K-TAB) 20 mEq tablet TAKE 1 TABLET BY MOUTH ONCE DAILY FOR LOW POTASSIUM 10/26/20   Other, MD Yessenia   Tobradex ophthalmic ointment APPLY TO EACH EYE AT BEDTIME AS NEEDED 11/3/20   Jim, MD Yessenia   Oxygen 1 Each by Nasal route daily as needed. Uses one liter of oxygen as needed    Other, MD Yessenia   amLODIPine (NORVASC) 5 mg tablet Indications: High Blood Pressure Disorder 1 pill daily for blood pressure  Patient not taking: Reported on 7/18/2021 7/2/20   Other, MD Yessenia   valACYclovir (VALTREX) 500 mg tablet 1 pill twice daily for 5 days if needed for outbreak 2/7/20   OtherYessenia MD   montelukast (SINGULAIR) 10 mg tablet Take 10 mg by mouth At bedtime. 5/18/20   Other, MD Yessenia   atorvastatin (LIPITOR) 10 mg tablet Take 20 mg by mouth daily. 5/18/20   Other, MD Yessenia   multivitamin (MULTIPLE VITAMINS PO) Take  by mouth. Other, MD Yessenia   loratadine (CLARITIN) 10 mg tablet Take 1 Tab by mouth daily. Indications: ALLERGIC RHINITIS  Patient not taking: Reported on 7/18/2021 3/7/17   Reji Mantilla NP   fluticasone [facility]) 50 mcg/actuation nasal spray One spray per nostril twice a day  Indications: ALLERGIC RHINITIS 3/7/17   Reji Mantilla NP   naproxen sodium (ALEVE) 220 mg cap Take  by mouth. Provider, Historical       Allergies:   Allergies   Allergen Reactions    Daliresp [Roflumilast] Diarrhea    Levofloxacin Diarrhea     Diarrhea and stomach cramps    Maxzide [Triamterene-Hydrochlorothiazid] Rash    Pcn [Penicillins] Unknown (comments) Social History:  Social History     Tobacco Use    Smoking status: Former Smoker     Quit date: 1995     Years since quittin.0    Smokeless tobacco: Never Used   Substance Use Topics    Alcohol use: Yes     Alcohol/week: 63.0 standard drinks     Types: 21 Glasses of wine, 42 Shots of liquor per week     Comment: 2 drinks per night    Drug use: No       Family History:  Family History   Problem Relation Age of Onset    Heart Disease Father     Hypertension Mother     Liver Disease Brother         Hepatitis C    Heart Failure Brother        ROS:  Total of 12 systems reviewed as follows:  POSITIVE= bolded text  Negative = text not bolded       General:  fever, chills, sweats, generalized weakness, weight loss/gain, loss of appetite   Eyes:    blurred vision, eye pain, loss of vision, double vision  ENT:    rhinorrhea, pharyngitis   Respiratory:  cough, sputum production, SOB, [place], wheezing, pleuritic pain   Cardiology:   chest pain, palpitations, orthopnea, PND, edema, syncope   Gastrointestinal:  abdominal pain , N/V, diarrhea, dysphagia, constipation, bleeding   Genitourinary:  frequency, urgency, dysuria, hematuria, incontinence, prostatism   Muskuloskeletal: arthralgia, myalgia, back pain  Hematology:   easy bruising, nose or gum bleeding, lymphadenopathy   Dermatological: rash, ulceration, pruritis, color change / jaundice  Endocrine:   hot flashes or polydipsia   Neurological:  headache, dizziness, confusion, focal weakness, paresthesia, speech difficulties, memory loss, gait difficulty  Psychological: feelings of anxiety, depression, agitation      PHYSICAL EXAM:  Patient Vitals for the past 24 hrs:   Temp Pulse Resp BP SpO2   22 1440 -- 81 22 (!) 161/83 94 %   22 1204 -- -- -- -- 95 %   22 1111 -- -- -- -- 95 %   22 1012 98.4 °F (36.9 °C) 98 24 (!) 151/83 100 %       General:    Alert, irritable,  cooperative, no distress, appears stated age. HEENT: Atraumatic, anicteric sclerae, pink conjunctivae     No oral ulcers, mucosa moist, throat clear, dentition fair  Neck:  Supple, symmetrical;   thyroid non tender  Lungs:   Reduce excursion, Nonprod cough. Mild crackles / rhonchi. No wheezing  Chest wall:  No tenderness. No accessory muscle use. Heart:   Regular rhythm. No  murmur. No edema  Abdomen:   Soft, non-tender. Not distended. Bowel sounds normal  Extremities: No cyanosis. No clubbing      Capillary refill normal,  Radial pulse 2+,  DP 1+  Skin:     Not pale. Not jaundiced. No rashes   Psych:  Not depressed. Anxious and agitated. Neurologic: EOMs intact. No facial asymmetry. No aphasia or slurred speech. Symmetrical strength, Sensation grossly intact. Alert and oriented    Lab Data Reviewed:    Recent Results (from the past 24 hour(s))   LACTIC ACID    Collection Time: 05/24/22 10:49 AM   Result Value Ref Range    Lactic acid 1.7 0.4 - 2.0 MMOL/L   METABOLIC PANEL, COMPREHENSIVE    Collection Time: 05/24/22 10:49 AM   Result Value Ref Range    Sodium 140 136 - 145 mmol/L    Potassium 4.3 3.5 - 5.1 mmol/L    Chloride 103 97 - 108 mmol/L    CO2 26 21 - 32 mmol/L    Anion gap 11 5 - 15 mmol/L    Glucose 97 65 - 100 mg/dL    BUN 15 6 - 20 MG/DL    Creatinine 1.00 0.55 - 1.02 MG/DL    BUN/Creatinine ratio 15 12 - 20      GFR est AA >60 >60 ml/min/1.73m2    GFR est non-AA 54 (L) >60 ml/min/1.73m2    Calcium 9.2 8.5 - 10.1 MG/DL    Bilirubin, total 0.6 0.2 - 1.0 MG/DL    ALT (SGPT) 66 12 - 78 U/L    AST (SGOT) 37 15 - 37 U/L    Alk.  phosphatase 86 45 - 117 U/L    Protein, total 6.7 6.4 - 8.2 g/dL    Albumin 3.3 (L) 3.5 - 5.0 g/dL    Globulin 3.4 2.0 - 4.0 g/dL    A-G Ratio 1.0 (L) 1.1 - 2.2     CBC WITH AUTOMATED DIFF    Collection Time: 05/24/22 10:49 AM   Result Value Ref Range    WBC 11.6 (H) 3.6 - 11.0 K/uL    RBC 3.87 3.80 - 5.20 M/uL    HGB 13.2 11.5 - 16.0 g/dL    HCT 39.0 35.0 - 47.0 %    .8 (H) 80.0 - 99.0 FL    MCH 34.1 (H) 26.0 - 34.0 PG    MCHC 33.8 30.0 - 36.5 g/dL    RDW 12.7 11.5 - 14.5 %    PLATELET 163 095 - 255 K/uL    MPV 8.7 (L) 8.9 - 12.9 FL    NRBC 0.0 0  WBC    ABSOLUTE NRBC 0.00 0.00 - 0.01 K/uL    NEUTROPHILS 73 32 - 75 %    LYMPHOCYTES 16 12 - 49 %    MONOCYTES 8 5 - 13 %    EOSINOPHILS 1 0 - 7 %    BASOPHILS 1 0 - 1 %    IMMATURE GRANULOCYTES 1 (H) 0.0 - 0.5 %    ABS. NEUTROPHILS 8.7 (H) 1.8 - 8.0 K/UL    ABS. LYMPHOCYTES 1.8 0.8 - 3.5 K/UL    ABS. MONOCYTES 0.9 0.0 - 1.0 K/UL    ABS. EOSINOPHILS 0.1 0.0 - 0.4 K/UL    ABS. BASOPHILS 0.1 0.0 - 0.1 K/UL    ABS. IMM. GRANS. 0.1 (H) 0.00 - 0.04 K/UL    DF AUTOMATED     TROPONIN-HIGH SENSITIVITY    Collection Time: 05/24/22 10:49 AM   Result Value Ref Range    Troponin-High Sensitivity 7 0 - 51 ng/L   URINALYSIS W/ REFLEX CULTURE    Collection Time: 05/24/22  1:52 PM    Specimen: Urine   Result Value Ref Range    Color YELLOW/STRAW      Appearance CLEAR CLEAR      Specific gravity 1.020 1.003 - 1.030      pH (UA) 7.5 5.0 - 8.0      Protein Negative NEG mg/dL    Glucose Negative NEG mg/dL    Ketone Negative NEG mg/dL    Bilirubin Negative NEG      Blood Negative NEG      Urobilinogen 0.2 0.2 - 1.0 EU/dL    Nitrites Negative NEG      Leukocyte Esterase SMALL (A) NEG      WBC 0-4 0 - 4 /hpf    RBC 0-5 0 - 5 /hpf    Epithelial cells FEW FEW /lpf    Bacteria Negative NEG /hpf    UA:UC IF INDICATED CULTURE NOT INDICATED BY UA RESULT CNI         EKG: NSR 93 bpm, Leftward axis, [place]    Radiology:  CTA CHEST W OR W WO CONT   Final Result   1. No evidence of pulmonary embolus or right heart strain. 2.  Severe emphysema with possible mild pulmonary edema. XR CHEST PORT   Final Result   Evidence of very subtle increased density involving the right upper   lobe. Presence of minimal left basilar linear atelectasis. A follow-up chest   examination is recommended.           Care Plan discussed with:   Patient x    Family  in room    RN x     Consultant      Expected  Disposition:   Home with Family x   HH/PT/OT/RN    SNF/LTC    GILBERT      TOTAL TIME:  61 Minutes      Comments    x Reviewed previous records   >50% of visit spent in counseling and coordination of care x Discussion with patient and/or family and questions answered       _______________________________________________________  Given the patient's current clinical presentation, I have a high level of concern for decompensation if discharged from the emergency department. Complex decision making was performed, which includes reviewing the patient's available past medical records, laboratory results, and x-ray films. My assessment of this patient's clinical condition and my plan of care is as follows. ASSESSMENT / PLAN    Principal Problem:    COPD with acute exacerbation ([address] (5/24/2022)  Lingering symptoms over the past 2 months  Has been treated in the ED twice on April 4 and May 13  Seen by her pulmonary specialist at [facility] (Dr. Columba Cash) on April 26  Was treated with tapered prednisone at each visit with only transient improvement  Continues home treatment for allergic rhinitis -Flonase/Singulair/Mucinex. Hold Allegra to avoid dryness  Attempt sputum for gram stain and culture  Empiric antibiotic therapy with Rocephin/Azithromycin x5 days  Pulmicort/Brovana by neb twice daily  DuoNeb every 6 hours  Solu-Medrol 40 mg every 8 hours  Plan follow-up chest x-ray to rule out infiltrate    Active Problems:    Essential hypertension (12/6/2020)  Continue current home treatment with Cozaar 100 mg daily      GERD (gastroesophageal reflux disease) ()  Protonix PPI substitute for Nexium  Pepcid 20 mg q.  Evening  Elevate head of bed      Uncomplicated alcohol dependence (HCC) (5/29/2020)  Chronic daily alcohol consumption  No prior history of DTs  Prescribing Ativan for anxiety as well      Herpes ()  No current symptoms  Current need for Valtrex        SAFETY:   Code Status:Full  DVT prophylaxis:Lovenox  Stress Ulcer prophylaxis: Protonix (home tx Nexium) and Pepcid q evening  Bladder catheter:no  Family Contact Info:  Primary Emergency Contact: Marques Crespo, Home Phone: [phone]: [facility] Room ED06/06  Disposition: TBD, likely home when stable  Admission status:  Inpatient    -Tentative plan of care discussed with patient / family, who demonstrated understanding and is in agreement to the above  -Case was reviewed with the ED Provider, Lemont Heman, MD Conda Fabry, MD  [facility] Hospitalist  [phone]

## 2022-05-24 NOTE — ED TRIAGE NOTES
Pt arrived with c/o a cough and shortness of breath for 2 weeks now. Was seen and treated here for these same symptoms 10 days ago but has not improved.  Pt wears chronic oxygen 2L only at night has a hx of COPD

## 2022-05-25 LAB
ANION GAP SERPL CALC-SCNC: 13 MMOL/L (ref 5–15)
BASOPHILS # BLD: 0 K/UL (ref 0–0.1)
BASOPHILS NFR BLD: 0 % (ref 0–1)
BUN SERPL-MCNC: 14 MG/DL (ref 6–20)
BUN/CREAT SERPL: 16 (ref 12–20)
CALCIUM SERPL-MCNC: 8.7 MG/DL (ref 8.5–10.1)
CHLORIDE SERPL-SCNC: 104 MMOL/L (ref 97–108)
CO2 SERPL-SCNC: 23 MMOL/L (ref 21–32)
CREAT SERPL-MCNC: 0.85 MG/DL (ref 0.55–1.02)
DIFFERENTIAL METHOD BLD: ABNORMAL
EOSINOPHIL # BLD: 0 K/UL (ref 0–0.4)
EOSINOPHIL NFR BLD: 0 % (ref 0–7)
ERYTHROCYTE [DISTWIDTH] IN BLOOD BY AUTOMATED COUNT: 12.5 % (ref 11.5–14.5)
GLUCOSE SERPL-MCNC: 153 MG/DL (ref 65–100)
HCT VFR BLD AUTO: 35.9 % (ref 35–47)
HGB BLD-MCNC: 12 G/DL (ref 11.5–16)
IMM GRANULOCYTES # BLD AUTO: 0.1 K/UL (ref 0–0.04)
IMM GRANULOCYTES NFR BLD AUTO: 1 % (ref 0–0.5)
LYMPHOCYTES # BLD: 0.5 K/UL (ref 0.8–3.5)
LYMPHOCYTES NFR BLD: 5 % (ref 12–49)
MAGNESIUM SERPL-MCNC: 2.1 MG/DL (ref 1.6–2.4)
MCH RBC QN AUTO: 34.2 PG (ref 26–34)
MCHC RBC AUTO-ENTMCNC: 33.4 G/DL (ref 30–36.5)
MCV RBC AUTO: 102.3 FL (ref 80–99)
MONOCYTES # BLD: 0.2 K/UL (ref 0–1)
MONOCYTES NFR BLD: 2 % (ref 5–13)
NEUTS SEG # BLD: 9.6 K/UL (ref 1.8–8)
NEUTS SEG NFR BLD: 92 % (ref 32–75)
NRBC # BLD: 0 K/UL (ref 0–0.01)
NRBC BLD-RTO: 0 PER 100 WBC
PLATELET # BLD AUTO: 262 K/UL (ref 150–400)
PMV BLD AUTO: 9.2 FL (ref 8.9–12.9)
POTASSIUM SERPL-SCNC: 3.9 MMOL/L (ref 3.5–5.1)
RBC # BLD AUTO: 3.51 M/UL (ref 3.8–5.2)
RBC MORPH BLD: ABNORMAL
SODIUM SERPL-SCNC: 140 MMOL/L (ref 136–145)
WBC # BLD AUTO: 10.4 K/UL (ref 3.6–11)

## 2022-05-25 PROCEDURE — 94761 N-INVAS EAR/PLS OXIMETRY MLT: CPT

## 2022-05-25 PROCEDURE — 36415 COLL VENOUS BLD VENIPUNCTURE: CPT

## 2022-05-25 PROCEDURE — 65270000046 HC RM TELEMETRY

## 2022-05-25 PROCEDURE — 94760 N-INVAS EAR/PLS OXIMETRY 1: CPT

## 2022-05-25 PROCEDURE — 74011250636 HC RX REV CODE- 250/636: Performed by: INTERNAL MEDICINE

## 2022-05-25 PROCEDURE — 83735 ASSAY OF MAGNESIUM: CPT

## 2022-05-25 PROCEDURE — 85025 COMPLETE CBC W/AUTO DIFF WBC: CPT

## 2022-05-25 PROCEDURE — 94640 AIRWAY INHALATION TREATMENT: CPT

## 2022-05-25 PROCEDURE — 74011250637 HC RX REV CODE- 250/637: Performed by: INTERNAL MEDICINE

## 2022-05-25 PROCEDURE — 80048 BASIC METABOLIC PNL TOTAL CA: CPT

## 2022-05-25 PROCEDURE — 74011000250 HC RX REV CODE- 250: Performed by: INTERNAL MEDICINE

## 2022-05-25 PROCEDURE — 74011000258 HC RX REV CODE- 258: Performed by: INTERNAL MEDICINE

## 2022-05-25 RX ORDER — HYDROCODONE POLISTIREX AND CHLORPHENIRAMINE POLISTIREX 10; 8 MG/5ML; MG/5ML
5 SUSPENSION, EXTENDED RELEASE ORAL EVERY 12 HOURS
Status: DISCONTINUED | OUTPATIENT
Start: 2022-05-25 | End: 2022-05-26

## 2022-05-25 RX ORDER — IPRATROPIUM BROMIDE AND ALBUTEROL SULFATE 2.5; .5 MG/3ML; MG/3ML
3 SOLUTION RESPIRATORY (INHALATION) 3 TIMES DAILY
Status: DISCONTINUED | OUTPATIENT
Start: 2022-05-26 | End: 2022-05-26

## 2022-05-25 RX ORDER — ALBUTEROL SULFATE 0.83 MG/ML
2.5 SOLUTION RESPIRATORY (INHALATION)
Status: DISCONTINUED | OUTPATIENT
Start: 2022-05-25 | End: 2022-05-27 | Stop reason: HOSPADM

## 2022-05-25 RX ADMIN — SODIUM CHLORIDE, PRESERVATIVE FREE 10 ML: 5 INJECTION INTRAVENOUS at 06:17

## 2022-05-25 RX ADMIN — CEFTRIAXONE SODIUM 2 G: 2 INJECTION, POWDER, FOR SOLUTION INTRAMUSCULAR; INTRAVENOUS at 11:17

## 2022-05-25 RX ADMIN — MONTELUKAST 10 MG: 10 TABLET, FILM COATED ORAL at 21:10

## 2022-05-25 RX ADMIN — METHYLPREDNISOLONE SODIUM SUCCINATE 40 MG: 40 INJECTION, POWDER, FOR SOLUTION INTRAMUSCULAR; INTRAVENOUS at 21:10

## 2022-05-25 RX ADMIN — FLUTICASONE PROPIONATE 2 SPRAY: 50 SPRAY, METERED NASAL at 08:56

## 2022-05-25 RX ADMIN — ARFORMOTEROL TARTRATE: 15 SOLUTION RESPIRATORY (INHALATION) at 20:03

## 2022-05-25 RX ADMIN — IPRATROPIUM BROMIDE AND ALBUTEROL SULFATE 3 ML: .5; 3 SOLUTION RESPIRATORY (INHALATION) at 13:43

## 2022-05-25 RX ADMIN — GUAIFENESIN 600 MG: 600 TABLET, EXTENDED RELEASE ORAL at 08:56

## 2022-05-25 RX ADMIN — IPRATROPIUM BROMIDE AND ALBUTEROL SULFATE 3 ML: .5; 3 SOLUTION RESPIRATORY (INHALATION) at 07:54

## 2022-05-25 RX ADMIN — HYDROCODONE POLISTIREX AND CHLORPHENIRAMINE POLISTIREX 5 ML: 10; 8 SUSPENSION, EXTENDED RELEASE ORAL at 16:17

## 2022-05-25 RX ADMIN — ARFORMOTEROL TARTRATE: 15 SOLUTION RESPIRATORY (INHALATION) at 08:01

## 2022-05-25 RX ADMIN — SODIUM CHLORIDE, PRESERVATIVE FREE 10 ML: 5 INJECTION INTRAVENOUS at 16:25

## 2022-05-25 RX ADMIN — SODIUM CHLORIDE, PRESERVATIVE FREE 10 ML: 5 INJECTION INTRAVENOUS at 21:10

## 2022-05-25 RX ADMIN — IPRATROPIUM BROMIDE AND ALBUTEROL SULFATE 3 ML: .5; 3 SOLUTION RESPIRATORY (INHALATION) at 19:38

## 2022-05-25 RX ADMIN — LOSARTAN POTASSIUM 100 MG: 100 TABLET, FILM COATED ORAL at 08:56

## 2022-05-25 RX ADMIN — GUAIFENESIN 600 MG: 600 TABLET, EXTENDED RELEASE ORAL at 17:27

## 2022-05-25 RX ADMIN — PANTOPRAZOLE SODIUM 40 MG: 40 TABLET, DELAYED RELEASE ORAL at 06:56

## 2022-05-25 RX ADMIN — AZITHROMYCIN DIHYDRATE 500 MG: 500 INJECTION, POWDER, LYOPHILIZED, FOR SOLUTION INTRAVENOUS at 11:17

## 2022-05-25 RX ADMIN — LORAZEPAM 0.5 MG: 0.5 TABLET ORAL at 21:10

## 2022-05-25 RX ADMIN — FAMOTIDINE 20 MG: 20 TABLET, FILM COATED ORAL at 17:27

## 2022-05-25 RX ADMIN — METHYLPREDNISOLONE SODIUM SUCCINATE 40 MG: 40 INJECTION, POWDER, FOR SOLUTION INTRAMUSCULAR; INTRAVENOUS at 06:16

## 2022-05-25 RX ADMIN — IPRATROPIUM BROMIDE AND ALBUTEROL SULFATE 3 ML: .5; 3 SOLUTION RESPIRATORY (INHALATION) at 01:38

## 2022-05-25 RX ADMIN — METHYLPREDNISOLONE SODIUM SUCCINATE 40 MG: 40 INJECTION, POWDER, FOR SOLUTION INTRAMUSCULAR; INTRAVENOUS at 16:17

## 2022-05-25 RX ADMIN — ATORVASTATIN CALCIUM 40 MG: 40 TABLET, FILM COATED ORAL at 21:09

## 2022-05-25 NOTE — PROGRESS NOTES
Bedside shift change report given to Karin (oncoming nurse) by Jazmin (offgoing nurse). Report included the following information SBAR, Kardex and MAR.

## 2022-05-25 NOTE — PROGRESS NOTES
Care Management Interventions  PCP Verified by CM: Yes Abbie Chani Agosto  Last Visit to PCP: 04/29/22  Palliative Care Criteria Met (RRAT>21 & CHF Dx)?: No (No MD order)  Mode of Transport at Discharge: Self (POV)  Transition of Care Consult (CM Consult): Discharge Planning  Physical Therapy Consult: No  Occupational Therapy Consult: No  Speech Therapy Consult: No  Support Systems: Spouse/Significant Other  Confirm Follow Up Transport: Family  The Plan for Transition of Care is Related to the Following Treatment Goals : Treat COPD exacerbation  Discharge Location  Patient Expects to be Discharged to[de-identified] Home with home health    Patient lives at home with her . Patient has an active ACP document on file. Confirmed with patient that it is correct. States her spouse Cali Mesa is primary healthcare decision maker and Crystal Johnson is secondary. Patient normally independent. Has a walker at home but doesn't use it. HAS HOME O2 through [address] Normally on 2L. Patient currently in inpatient status. 1st IMM already obtained. Patient told to contact care management for any questions or concerns. Reason for Admission:  COPD exacerbation                      RUR Score:          12% LOW           Plan for utilizing home health:TBD          PCP: First and Last name:  Manjinder Jurado MD     Name of Practice: [address]    Are you a current patient: Yes/No: yes   Approximate date of last visit: 4/29/22   Can you participate in a virtual visit with your PCP: yes                    Current Advanced Directive/Advance Care Plan: Full Code      Healthcare Decision Maker:   Click here to complete [address] including selection of the Healthcare Decision Maker Relationship (ie \"Primary\")             Primary Decision MakerElinor [facility] - [phone]    Secondary Decision Maker:  Miguel Jessica Daughter - [phone]                  Transition of Care Plan: Home w/hh when medically stable                Simón Love (ACP) Conversation      Date of Conversation: 5/24/2022  Conducted with: Patient with Decision Making Capacity    Healthcare Decision Maker:     Primary Decision Maker: Darren Carrel Spouse - [phone]    Secondary Decision Maker: Trupti Sim - Daughter - [phone]  Click here to complete [address] including selection of the Healthcare Decision Maker Relationship (ie \"Primary\")      Today we documented Decision Maker(s) consistent with ACP documents on file. Content/Action Overview:    Has ACP document(s) on file - reflects the patient's care preferences  Topics discussed: treatment goals  Additional Comments: n/a     Length of Voluntary ACP Conversation in minutes:  16 minutes    Shaquille

## 2022-05-25 NOTE — PROGRESS NOTES
Bedside shift change report given to LAYO Jett RN (oncoming nurse) by Arminda Preston RN (offgoing nurse). Report included the following information Kardex and Recent Results.

## 2022-05-25 NOTE — PROGRESS NOTES
Problem: Chronic Obstructive Pulmonary Disease (COPD)  Goal: *Oxygen saturation during activity within specified parameters  Outcome: Progressing Towards Goal  Goal: *Absence of hypoxia  Outcome: Progressing Towards Goal     Problem: Hypertension  Goal: *Blood pressure within specified parameters  Outcome: Progressing Towards Goal  Goal: *Labs within defined limits  Outcome: Progressing Towards Goal     Problem: Falls - Risk of  Goal: *Absence of Falls  Description: Document Theodore Fall Risk and appropriate interventions in the flowsheet.   Outcome: Progressing Towards Goal  Note: Fall Risk Interventions:            Medication Interventions: Patient to call before getting OOB

## 2022-05-25 NOTE — PROGRESS NOTES
Problem: Chronic Obstructive Pulmonary Disease (COPD)  Goal: *Oxygen saturation during activity within specified parameters  Outcome: Progressing Towards Goal     Problem: Chronic Obstructive Pulmonary Disease (COPD)  Goal: *Absence of hypoxia  Outcome: Progressing Towards Goal     Problem: Patient Education: Go to Patient Education Activity  Goal: Patient/Family Education  Outcome: Progressing Towards Goal

## 2022-05-25 NOTE — RT PROTOCOL NOTE
RT Inhaler-Nebulizer Bronchodilator Protocol Note    There is a bronchodilator order in the chart from a provider indicating to follow the RT Bronchodilator Protocol and there is an Initiate RT Bronchodilator Protocol order as well (see protocol at bottom of note). CXR Findings:  Recent Results (from the past 2 days)    XR CHEST PORT 05/24/2022    Impression  Evidence of very subtle increased density involving the right upper  lobe. Presence of minimal left basilar linear atelectasis. A follow-up chest  examination is recommended. The findings from the last RT Protocol Assessment were as follows:  History of Pulmonary Disease: None or smoker <15 pack years  Respiratory Pattern: Regular pattern and RR 12-20 bpm  Breath Sounds: Slighly diminished and/or crackles, scattered exp wheezes post HHN  Cough: Strong, spontaneous, non-productive  Indication for Bronchodilator Therapy: On home bronchodilators  Bronchodilator Assessment Score: 4    Aerosolized bronchodilator medication orders have been revised according to the RT Inhaler-Nebulizer Bronchodilator Protocol below. Respiratory Therapist to perform RT Therapy Protocol Assessment initially then follow the protocol. Repeat RT Therapy Protocol Assessment PRN for score 0-3 or on second treatment, BID, and PRN for scores above 3. No Indications  adjust the frequency to every 6 hours PRN wheezing or bronchospasm, if no treatments needed after 48 hours then discontinue using Per Protocol order mode. If indication present, adjust the RT bronchodilator orders based on the Bronchodilator Assessment Score as indicated below.   Use Inhaler orders unless patient has one or more of the following: on home nebulizer, not able to hold breath for 10 seconds, is not alert and oriented, cannot activate and use MDI correctly, or respiratory rate 25 breaths per minute or more, then use the equivalent nebulizer order(s) with same Frequency and PRN reasons based on the score.  If a patient is on this medication at home then do not decrease Frequency below that used at home. 0-3  enter or revise RT bronchodilator order(s) to equivalent RT Bronchodilator order with Frequency of every 4 hours PRN for wheezing or increased work of breathing using Per Protocol order mode. 4-6  enter or revise RT Bronchodilator order(s) to two equivalent RT bronchodilator orders with one order with BID Frequency and one order with Frequency of every 4 hours PRN wheezing or increased work of breathing using Per Protocol order mode. 7-10  enter or revise RT Bronchodilator order(s) to two equivalent RT bronchodilator orders with one order with TID Frequency and one order with Frequency of every 4 hours PRN wheezing or increased work of breathing using Per Protocol order mode. 11-13  enter or revise RT Bronchodilator order(s) to one equivalent RT bronchodilator order with QID Frequency and an Albuterol order with Frequency of every 4 hours PRN wheezing or increased work of breathing using Per Protocol order mode. Greater than 13  enter or revise RT Bronchodilator order(s) to one equivalent RT bronchodilator order with every 4 hours Frequency and an Albuterol order with Frequency of every 2 hours PRN wheezing or increased work of breathing using Per Protocol order mode. RT to enter RT Home Evaluation for COPD & MDI Assessment order using Per Protocol order mode.     Electronically signed by RT Radha on 5/25/2022 at 7:45 PM

## 2022-05-25 NOTE — PROGRESS NOTES
[facility]  Hospitalist Progress Note    NAME: Juan Jose Shepherd   :  1945   MRN:  [id number]     Total duration of encounter: 1 day      Interim Hospital Summary: 65 y.o. female who presented on 2022 with COPD with acute exacerbation ([facility] [address] She has a past medical history of Chronic obstructive pulmonary disease ([facility] [address] Environmental and seasonal allergies, GERD (gastroesophageal reflux disease), Herpes, colonoscopy (2017), Hypertension, and OA (osteoarthritis). She has no past medical history of Endocrine disorder. .      Pt referred from PCP with persistent cough and SOB over the past 2 months. She has been tx with Pred tapers without clearing. Sputum has been discolored. Subjective:     Chief Complaint / Reason for Physician Visit  \"cough\". Discussed with RN   No sputum sample  Eating fair    Dr. Tiki Morrow called noting office testing noted hypoxia with activity and recommendation for O2   [facility] will not supply Inogen.     is planning to purchase    Review of Systems:  Symptom Y/N Comments  Symptom Y/N Comments   Fever/Chills n   Chest Pain n    Poor Appetite n   Edema n    Cough y   Abdominal Pain n    Sputum y  dry today  Joint Pain n    SOB/JOHNSTON y   Pruritis/Rash n    Nausea/vomit n   Tolerating PT/OT     Diarrhea n   Tolerating Diet y    Constipation n   Other         Current Facility-Administered Medications:     sodium chloride (NS) flush 5-10 mL, 5-10 mL, IntraVENous, PRN, Derick Driver MD    acetaminophen (TYLENOL) tablet 650 mg, 650 mg, Oral, Q6H PRN, Derick Driver MD    azithromycin (ZITHROMAX) 500 mg in 0.9% sodium chloride 250 mL ([id number]), 500 mg, IntraVENous, Q24H, Derick Driver MD    cefTRIAXone (ROCEPHIN) 2 g in 0.9% sodium chloride (MBP/ADV) 50 mL MBP, 2 g, IntraVENous, Q24H, Derick Driver MD    albuterol-ipratropium (DUO-NEB) 2.5 MG-0.5 MG/3 ML, 3 mL, Nebulization, Q6H RT, Derick Driver MD, 3 mL at 22 0138    arformoterol 15 mcg/budesonide 0.5 mg neb solution, , Nebulization, BID RT, Mateo Penaloza MD, Given at 05/24/22 2048    atorvastatin (LIPITOR) tablet 40 mg, 40 mg, Oral, QHS, Mateo Penaloza MD, 40 mg at 05/24/22 2204    pantoprazole (PROTONIX) tablet 40 mg, 40 mg, Oral, ACB, Mateo Penaloza MD, 40 mg at 05/25/22 0656    fluticasone propionate (FLONASE) 50 mcg/actuation nasal spray 2 Spray, 2 Spray, Both Nostrils, DAILY, Mateo Penaloza MD    guaiFENesin ER [facility]) tablet 600 mg, 600 mg, Oral, BID, Mateo Penaloza MD, 600 mg at 05/24/22 1822    LORazepam (ATIVAN) tablet 0.5 mg, 0.5 mg, Oral, Q8H PRN, Mateo Penaloza MD    losartan (COZAAR) tablet 100 mg, 100 mg, Oral, DAILY, Mateo Penaloza MD    montelukast (SINGULAIR) tablet 10 mg, 10 mg, Oral, QHS, Mateo Penaloza MD, 10 mg at 05/24/22 2204    sodium chloride (NS) flush 5-40 mL, 5-40 mL, IntraVENous, Q8H, Mateo Penaloza MD, 10 mL at 05/25/22 0617    sodium chloride (NS) flush 5-40 mL, 5-40 mL, IntraVENous, PRN, Mateo Penaloza MD    polyethylene glycol (MIRALAX) packet 17 g, 17 g, Oral, DAILY PRN, Mateo Penaloza MD    ondansetron (ZOFRAN ODT) tablet 4 mg, 4 mg, Oral, Q8H PRN **OR** ondansetron (ZOFRAN) injection 4 mg, 4 mg, IntraVENous, Q6H PRN, Mateo Penaloza MD    enoxaparin (LOVENOX) injection 40 mg, 40 mg, SubCUTAneous, DAILY, Mateo Penaloza MD    famotidine (PEPCID) tablet 20 mg, 20 mg, Oral, QPM, Mateo Penaloza MD, 20 mg at 05/24/22 1822    methylPREDNISolone (PF) (SOLU-MEDROL) injection 40 mg, 40 mg, IntraVENous, Q8H, Mateo Penaloza MD, 40 mg at 05/25/22 0616    HYDROcodone-chlorpheniramine (TUSSIONEX) oral suspension 5 mL, 5 mL, Oral, Q12H PRN, Mateo Penaloza MD, 5 mL at 05/24/22 4615    Objective:     VITALS:   Patient Vitals for the past 12 hrs:   Temp Pulse Resp BP SpO2   05/25/22 0728 97.4 °F (36.3 °C) 92 20 (!) 176/77 96 %   05/25/22 0405 97.4 °F (36.3 °C) 91 20 (!) 151/71 96 %   05/25/22 0138 -- -- -- -- 98 %   05/25/22 0011 97.8 °F (36.6 °C) 90 20 (!) 168/74 94 %   05/24/22 2048 -- -- -- -- 96 %   05/24/22 2005 97.5 °F (36.4 °C) (!) 103 20 (!) 159/77 96 %       Intake/Output Summary (Last 24 hours) at 5/25/2022 0737  Last data filed at 5/24/2022 1836  Gross per 24 hour   Intake 1983 ml   Output --   Net 1983 ml        PHYSICAL EXAM:  General: WD, WN. Alert, cooperative, no acute distress    EENT:  EOMI. Anicteric sclerae. MMM  Resp:  Nonprod cough, reduced excursion, no wheezing or rales. No accessory muscle use  CV:  Regular  rhythm,  No edema  GI:  Soft, Non distended, Non tender. +Bowel sounds  Neurologic:  Alert and oriented X 3, normal speech, nonfocal  Psych:   Not anxious nor agitated  Skin:  No rashes. No jaundice    LABS:  I reviewed today's most current labs and imaging studies. Pertinent labs include:  Recent Labs     05/25/22  0537 05/24/22  1049   WBC 10.4 11.6*   HGB 12.0 13.2   HCT 35.9 39.0    276     Recent Labs     05/25/22  0537 05/24/22  1049    140   K 3.9 4.3    103   CO2 23 26   * 97   BUN 14 15   CREA 0.85 1.00   CA 8.7 9.2   MG 2.1  --    ALB  --  3.3*   TBILI  --  0.6   ALT  --  66     CULTURES  Sputum 5/24:  Sample pending  Paired [facility] 5/24:     Results for Pattie Carolina (MRN [id number]) as of 5/25/2022 13:44   5/13/2022 13:17   Influenza A by PCR Not detected   Influenza B by PCR Not detected   SARS-COV-2 PCR Not detected         EKG: NSR 93 bpm, Leftward axis, NSC    RADIOLOGY:  pCXR 5/24:  A single portable AP upright view of the chest was obtained. Partially calcified  bilateral breast implants are present. There is slight prominence of the  pulmonary interstitial markings. This is best appreciated in the region of the  right upper lobe. Very subtle hazy density is noted in this region. There is  some linear atelectatic change in the lateral aspect of the left lung base.   IMPRESSION  Evidence of very subtle increased density involving the right upper  lobe.  Presence of minimal left basilar linear atelectasis. A follow-up chest  examination is recommended. CTA CHEST 5/24:  LUNGS: Severe emphysema. No suspicious nodules, mass. Mild increased septal  thickening groundglass opacification. IMPRESSION  1. No evidence of pulmonary embolus or right heart strain. 2.  Severe emphysema with possible mild pulmonary edema. Procedures: see electronic medical records for all procedures/Xrays and details which were not copied into this note but were reviewed prior to creation of Plan. Assessment / Plan:    Principal Problem:    COPD with acute exacerbation ([address] (5/24/2022)  Lingering symptoms over the past 2 months  Has been treated in the ED twice on April 4 and May 13  Seen by her pulmonary specialist at [facility] (Dr. Mai Session) on April 26  Was treated with tapered prednisone at each visit with only transient improvement  Continues home treatment for allergic rhinitis -Flonase/Singulair/Mucinex. Hold Allegra to avoid dryness  Attempt sputum for gram stain and culture  Empiric antibiotic therapy with Rocephin/Azithromycin x5 days  Pulmicort/Brovana by neb twice daily  DuoNeb every 6 hours  Solu-Medrol 40 mg every 8 hours  Codiclear bid  F/u CXR PA/LAT in AM     Active Problems:    Essential hypertension (12/6/2020)  Continue current home treatment with Cozaar 100 mg daily       GERD (gastroesophageal reflux disease) ()  Protonix PPI substitute for Nexium  Pepcid 20 mg q.  Evening  Elevate head of bed       Uncomplicated alcohol dependence (HCC) (5/29/2020)  Chronic daily alcohol consumption  No prior history of DTs  Prescribing Ativan for anxiety as well       Herpes ()  No current symptoms  Current need for Valtrex         SAFETY:   Code Status:Full  DVT prophylaxis:Lovenox  Stress Ulcer prophylaxis: Protonix (home tx Nexium) and Pepcid q evening  Bladder catheter:no  Family Contact Info:  Primary Emergency Contact: Marques Crespo, Home Phone: [phone]  Bedded: [facility] Room ED06/06  Disposition: TBD, likely home when stable  Admission status:  Inpatient    Reviewed most current lab test results and cultures  YES  Reviewed most current radiology test results   YES  Review and summation of old records today    NO  Reviewed patient's current orders and MAR    YES  PMH/SH reviewed - no change compared to H&P    Care Plan discussed with:                                   Comments  Patient x     Family  x    RN x     Care Manager  x     Consultant                          x Multidiciplinary team rounds were held today with , nursing, pharmacist and clinical coordinator. Patient's plan of care was discussed; medications were reviewed and discharge planning was addressed.         ____________________________________________    Total NON Critical Care TIME:  35   Minutes        Comments   >50% of visit spent in counseling and coordination of care x      Signed: Deo Cancino MD  [facility] Hospitalist  [phone]

## 2022-05-26 ENCOUNTER — APPOINTMENT (OUTPATIENT)
Dept: GENERAL RADIOLOGY | Age: 77
DRG: 192 | End: 2022-05-26
Attending: INTERNAL MEDICINE
Payer: MEDICARE

## 2022-05-26 PROCEDURE — 74011250637 HC RX REV CODE- 250/637: Performed by: INTERNAL MEDICINE

## 2022-05-26 PROCEDURE — 65270000029 HC RM PRIVATE

## 2022-05-26 PROCEDURE — 77010033678 HC OXYGEN DAILY

## 2022-05-26 PROCEDURE — 94760 N-INVAS EAR/PLS OXIMETRY 1: CPT

## 2022-05-26 PROCEDURE — 74011000258 HC RX REV CODE- 258: Performed by: INTERNAL MEDICINE

## 2022-05-26 PROCEDURE — 94640 AIRWAY INHALATION TREATMENT: CPT

## 2022-05-26 PROCEDURE — 71046 X-RAY EXAM CHEST 2 VIEWS: CPT

## 2022-05-26 PROCEDURE — 74011000250 HC RX REV CODE- 250: Performed by: INTERNAL MEDICINE

## 2022-05-26 PROCEDURE — 94761 N-INVAS EAR/PLS OXIMETRY MLT: CPT

## 2022-05-26 PROCEDURE — 74011250636 HC RX REV CODE- 250/636: Performed by: INTERNAL MEDICINE

## 2022-05-26 RX ORDER — GUAIFENESIN 100 MG/5ML
100 SOLUTION ORAL
Status: DISCONTINUED | OUTPATIENT
Start: 2022-05-26 | End: 2022-05-27 | Stop reason: HOSPADM

## 2022-05-26 RX ORDER — HYDROCODONE BITARTRATE AND ACETAMINOPHEN 5; 325 MG/1; MG/1
1 TABLET ORAL EVERY 8 HOURS
Status: DISCONTINUED | OUTPATIENT
Start: 2022-05-26 | End: 2022-05-27 | Stop reason: HOSPADM

## 2022-05-26 RX ORDER — ALBUTEROL SULFATE 0.83 MG/ML
2.5 SOLUTION RESPIRATORY (INHALATION)
Status: DISCONTINUED | OUTPATIENT
Start: 2022-05-26 | End: 2022-05-27 | Stop reason: HOSPADM

## 2022-05-26 RX ADMIN — POLYETHYLENE GLYCOL 3350 17 G: 17 POWDER, FOR SOLUTION ORAL at 12:47

## 2022-05-26 RX ADMIN — AZITHROMYCIN DIHYDRATE 500 MG: 500 INJECTION, POWDER, LYOPHILIZED, FOR SOLUTION INTRAVENOUS at 12:15

## 2022-05-26 RX ADMIN — METHYLPREDNISOLONE SODIUM SUCCINATE 40 MG: 40 INJECTION, POWDER, FOR SOLUTION INTRAMUSCULAR; INTRAVENOUS at 06:32

## 2022-05-26 RX ADMIN — ATORVASTATIN CALCIUM 40 MG: 40 TABLET, FILM COATED ORAL at 21:47

## 2022-05-26 RX ADMIN — FLUTICASONE PROPIONATE 2 SPRAY: 50 SPRAY, METERED NASAL at 10:28

## 2022-05-26 RX ADMIN — METHYLPREDNISOLONE SODIUM SUCCINATE 40 MG: 40 INJECTION, POWDER, FOR SOLUTION INTRAMUSCULAR; INTRAVENOUS at 15:57

## 2022-05-26 RX ADMIN — ARFORMOTEROL TARTRATE: 15 SOLUTION RESPIRATORY (INHALATION) at 08:02

## 2022-05-26 RX ADMIN — IPRATROPIUM BROMIDE AND ALBUTEROL SULFATE 3 ML: .5; 3 SOLUTION RESPIRATORY (INHALATION) at 14:02

## 2022-05-26 RX ADMIN — MONTELUKAST 10 MG: 10 TABLET, FILM COATED ORAL at 21:46

## 2022-05-26 RX ADMIN — LOSARTAN POTASSIUM 100 MG: 100 TABLET, FILM COATED ORAL at 10:18

## 2022-05-26 RX ADMIN — GUAIFENESIN 600 MG: 600 TABLET, EXTENDED RELEASE ORAL at 10:18

## 2022-05-26 RX ADMIN — ARFORMOTEROL TARTRATE: 15 SOLUTION RESPIRATORY (INHALATION) at 20:01

## 2022-05-26 RX ADMIN — SODIUM CHLORIDE, PRESERVATIVE FREE 10 ML: 5 INJECTION INTRAVENOUS at 06:30

## 2022-05-26 RX ADMIN — LORAZEPAM 0.5 MG: 0.5 TABLET ORAL at 21:54

## 2022-05-26 RX ADMIN — HYDROCODONE POLISTIREX AND CHLORPHENIRAMINE POLISTIREX 5 ML: 10; 8 SUSPENSION, EXTENDED RELEASE ORAL at 13:01

## 2022-05-26 RX ADMIN — FAMOTIDINE 20 MG: 20 TABLET, FILM COATED ORAL at 18:04

## 2022-05-26 RX ADMIN — SODIUM CHLORIDE, PRESERVATIVE FREE 10 ML: 5 INJECTION INTRAVENOUS at 15:57

## 2022-05-26 RX ADMIN — HYDROCODONE POLISTIREX AND CHLORPHENIRAMINE POLISTIREX 5 ML: 10; 8 SUSPENSION, EXTENDED RELEASE ORAL at 02:02

## 2022-05-26 RX ADMIN — IPRATROPIUM BROMIDE AND ALBUTEROL SULFATE 3 ML: .5; 3 SOLUTION RESPIRATORY (INHALATION) at 07:51

## 2022-05-26 RX ADMIN — ALBUTEROL SULFATE 2.5 MG: 2.5 SOLUTION RESPIRATORY (INHALATION) at 19:49

## 2022-05-26 RX ADMIN — CEFTRIAXONE SODIUM 2 G: 2 INJECTION, POWDER, FOR SOLUTION INTRAMUSCULAR; INTRAVENOUS at 10:20

## 2022-05-26 RX ADMIN — SODIUM CHLORIDE, PRESERVATIVE FREE 10 ML: 5 INJECTION INTRAVENOUS at 21:47

## 2022-05-26 RX ADMIN — HYDROCODONE BITARTRATE AND ACETAMINOPHEN 1 TABLET: 5; 325 TABLET ORAL at 21:47

## 2022-05-26 RX ADMIN — PANTOPRAZOLE SODIUM 40 MG: 40 TABLET, DELAYED RELEASE ORAL at 06:32

## 2022-05-26 RX ADMIN — METHYLPREDNISOLONE SODIUM SUCCINATE 40 MG: 40 INJECTION, POWDER, FOR SOLUTION INTRAMUSCULAR; INTRAVENOUS at 21:46

## 2022-05-26 RX ADMIN — POLYETHYLENE GLYCOL 3350 17 G: 17 POWDER, FOR SOLUTION ORAL at 02:02

## 2022-05-26 RX ADMIN — GUAIFENESIN 600 MG: 600 TABLET, EXTENDED RELEASE ORAL at 18:04

## 2022-05-26 NOTE — PROGRESS NOTES
Spiritual Care Assessment/Progress Note  Segundo Jama      NAME: Arelis Brown      MRN: [id number]  AGE: 65 y.o.  SEX: female  Orthodox Affiliation: Yarsani   Language: English     5/26/2022     Total Time (in minutes): 13     Spiritual Assessment begun in \[facility] [place] [facility]\"" MED/SURG through conversation with:         [x]Patient        [] Family    [] Friend(s)        Reason for Consult: Initial/Spiritual assessment, patient floor     Spiritual beliefs: (Please include comment if needed)     [x] Identifies with a tressa tradition:         [] Supported by a tressa community:            [] Claims no spiritual orientation:           [] Seeking spiritual identity:                [] Adheres to an individual form of spirituality:           [] Not able to assess:                           Identified resources for coping:      [] Prayer                               [] Music                  [] Guided Imagery     [x] Family/friends                 [] Pet visits     [] Devotional reading                         [] Unknown     [] Other:                                     Interventions offered during this visit: (See comments for more details)    Patient Interventions: Affirmation of emotions/emotional suffering,Affirmation of tressa,Iconic (affirming the presence of God/Higher Power),Initial/Spiritual assessment, patient floor,Prayer (assurance of)           Plan of Care:     [x] Support spiritual and/or cultural needs    [] Support AMD and/or advance care planning process      [] Support grieving process   [] Coordinate Rites and/or Rituals    [] Coordination with community clergy   [] No spiritual needs identified at this time   [] Detailed Plan of Care below (See Comments)  [] Make referral to Music Therapy  [] Make referral to Pet Therapy     [] Make referral to Addiction services  [] Make referral to [facility]  [] Make referral to Spiritual Care Partner  [] No future visits requested        [] Contact Spiritual Care for further referrals     Comments: INITIAL SPIRITUAL ASSESSMENT in Rm 110 in Med/Surg  Patient was alone during the visit. Patient shared about her COPD and her supportive friends.  Provided empathic listening and spiritual support  Advised of  Availability   [address] [place] [address]

## 2022-05-26 NOTE — RT PROTOCOL NOTE
RT Inhaler-Nebulizer Bronchodilator Protocol Note    There is a bronchodilator order in the chart from a provider indicating to follow the RT Bronchodilator Protocol and there is an Initiate RT Bronchodilator Protocol order as well (see protocol at bottom of note). CXR Findings:  Recent Results (from the past 2 days)    XR CHEST PORT 05/24/2022    Impression  Evidence of very subtle increased density involving the right upper  lobe. Presence of minimal left basilar linear atelectasis. A follow-up chest  examination is recommended. The findings from the last RT Protocol Assessment were as follows:  History of Pulmonary Disease: Chronic pulmonary disease  Respiratory Pattern: Regular pattern and RR 12-20 bpm  Breath Sounds: Intermittent or unilateral wheezes  Cough: Strong, spontaneous, non-productive  Indication for Bronchodilator Therapy: On home bronchodilators  Bronchodilator Assessment Score: 6    Aerosolized bronchodilator medication orders have been revised according to the RT Inhaler-Nebulizer Bronchodilator Protocol below. Respiratory Therapist to perform RT Therapy Protocol Assessment initially then follow the protocol. Repeat RT Therapy Protocol Assessment PRN for score 0-3 or on second treatment, BID, and PRN for scores above 3. No Indications - adjust the frequency to every 6 hours PRN wheezing or bronchospasm, if no treatments needed after 48 hours then discontinue using Per Protocol order mode. If indication present, adjust the RT bronchodilator orders based on the Bronchodilator Assessment Score as indicated below. Use Inhaler orders unless patient has one or more of the following: on home nebulizer, not able to hold breath for 10 seconds, is not alert and oriented, cannot activate and use MDI correctly, or respiratory rate 25 breaths per minute or more, then use the equivalent nebulizer order(s) with same Frequency and PRN reasons based on the score.   If a patient is on this medication at home then do not decrease Frequency below that used at home. 0-3 - enter or revise RT bronchodilator order(s) to equivalent RT Bronchodilator order with Frequency of every 4 hours PRN for wheezing or increased work of breathing using Per Protocol order mode. 4-6 - enter or revise RT Bronchodilator order(s) to two equivalent RT bronchodilator orders with one order with BID Frequency and one order with Frequency of every 4 hours PRN wheezing or increased work of breathing using Per Protocol order mode. 7-10 - enter or revise RT Bronchodilator order(s) to two equivalent RT bronchodilator orders with one order with TID Frequency and one order with Frequency of every 4 hours PRN wheezing or increased work of breathing using Per Protocol order mode. 11-13 - enter or revise RT Bronchodilator order(s) to one equivalent RT bronchodilator order with QID Frequency and an Albuterol order with Frequency of every 4 hours PRN wheezing or increased work of breathing using Per Protocol order mode. Greater than 13 - enter or revise RT Bronchodilator order(s) to one equivalent RT bronchodilator order with every 4 hours Frequency and an Albuterol order with Frequency of every 2 hours PRN wheezing or increased work of breathing using Per Protocol order mode. RT to enter RT Home Evaluation for COPD & MDI Assessment order using Per Protocol order mode.     Electronically signed by RT Vi on 5/26/2022 at 6:55 AM

## 2022-05-26 NOTE — PROGRESS NOTES
IDR Team; MD, Nursing, Care Manager, Physical therapy, Nursing Supervisor, Pharmacy and  met to review patient's plan of care. Discussed goals, interventions, barriers and progress. RUR: 13%  LOW    Team will continue to monitor progress and report any concerns to the physician and care management as indicated. Transition of Care Plan:   According to attending, patient is being treated empirically. Xray looks good. Patient has a dry cough and receiving cough medication. Her  is buying an Inogen portable oxygen tank since [address] will not supply them with one. Patient has home oxygen. Oxygen Sats drop with activity. Medicare pt has received, reviewed, and signed 2nd IM letter informing her of her right to appeal the discharge. Signed copied has been placed on pt bedside chart.  Patient did not want her copy

## 2022-05-26 NOTE — PROGRESS NOTES
[facility]  Hospitalist Progress Note    NAME: Maria Antonia Carlson   :  1945   MRN:  [id number]     Total duration of encounter: 2 days      Interim Hospital Summary: 65 y.o. female who presented on 2022 with COPD with acute exacerbation ([facility] [address] She has a past medical history of Chronic obstructive pulmonary disease ([facility] [address] Environmental and seasonal allergies, GERD (gastroesophageal reflux disease), Herpes, colonoscopy (2017), Hypertension, and OA (osteoarthritis). She has no past medical history of Endocrine disorder. .      Pt referred from PCP with persistent cough and SOB over the past 2 months. She has been tx with Pred tapers without clearing. Sputum has been discolored. Subjective:     Chief Complaint / Reason for Physician Visit  \"cough\". Discussed with RN   No sputum sample produced since admission  ( Ipatropium and Chlorpheneramine )  Dry aggravating cough    Dr. Jas Fernandez called noting office testing noted hypoxia with activity and recommendation for O2   [address] will not supply Inogen.     is planning to purchase    Review of Systems:  Symptom Y/N Comments  Symptom Y/N Comments   Fever/Chills n   Chest Pain n    Poor Appetite n   Edema n    Cough y   Abdominal Pain n    Sputum y  dry today  Joint Pain n    SOB/JOHNSTON y   Pruritis/Rash n    Nausea/vomit n   Tolerating PT/OT     Diarrhea n   Tolerating Diet y    Constipation n   Other         Current Facility-Administered Medications:     albuterol (PROVENTIL VENTOLIN) nebulizer solution 2.5 mg, 2.5 mg, Nebulization, TID RT, Bridget Resendiz MD    HYDROcodone-acetaminophen (NORCO) 5-325 mg per tablet 1 Tablet, 1 Tablet, Oral, Q8H, Bridget Resendiz MD    guaiFENesin (ROBITUSSIN) 100 mg/5 mL oral liquid 100 mg, 100 mg, Oral, Q4H PRN, Bridget Resendiz MD    albuterol (PROVENTIL VENTOLIN) nebulizer solution 2.5 mg, 2.5 mg, Nebulization, Q4H PRN, Bridget Resendiz MD    sodium chloride (NS) flush 5-10 mL, 5-10 mL, IntraVENous, PRN, Mariluz De Dios MD    acetaminophen (TYLENOL) tablet 650 mg, 650 mg, Oral, Q6H PRN, Mariluz De Dios MD    azithromycin (ZITHROMAX) 500 mg in 0.9% sodium chloride 250 mL ([id number]), 500 mg, IntraVENous, Q24H, Mariluz De Dios MD, Last Rate: 250 mL/hr at 05/26/22 1215, 500 mg at 05/26/22 1215    cefTRIAXone (ROCEPHIN) 2 g in 0.9% sodium chloride (MBP/ADV) 50 mL MBP, 2 g, IntraVENous, Q24H, Mariluz De Dios MD, Last Rate: 100 mL/hr at 05/26/22 1020, 2 g at 05/26/22 1020    arformoterol 15 mcg/budesonide 0.5 mg neb solution, , Nebulization, BID RT, Mariluz De Dios MD, Given at 05/26/22 0802    atorvastatin (LIPITOR) tablet 40 mg, 40 mg, Oral, QHS, Mariluz De Dios MD, 40 mg at 05/25/22 2109    pantoprazole (PROTONIX) tablet 40 mg, 40 mg, Oral, ACB, Mariluz De Dios MD, 40 mg at 05/26/22 6412    fluticasone propionate (FLONASE) 50 mcg/actuation nasal spray 2 Spray, 2 Spray, Both Nostrils, DAILY, Mariluz De Dios MD, 2 [place] at 05/26/22 1028    guaiFENesin ER ([address]) tablet 600 mg, 600 mg, Oral, BID, Mariluz De Dios MD, 600 mg at 05/26/22 1804    LORazepam (ATIVAN) tablet 0.5 mg, 0.5 mg, Oral, Q8H PRN, Mariluz De Dios MD, 0.5 mg at 05/25/22 2110    losartan (COZAAR) tablet 100 mg, 100 mg, Oral, DAILY, Mariluz De Dios MD, 100 mg at 05/26/22 1018    montelukast (SINGULAIR) tablet 10 mg, 10 mg, Oral, QHS, Mariluz De Dios MD, 10 mg at 05/25/22 2110    sodium chloride (NS) flush 5-40 mL, 5-40 mL, IntraVENous, Q8H, Mariluz De Dios MD, 10 mL at 05/26/22 1557    sodium chloride (NS) flush 5-40 mL, 5-40 mL, IntraVENous, PRN, Mariluz De Dios MD    polyethylene glycol (MIRALAX) packet 17 g, 17 g, Oral, DAILY PRN, Mariluz De Dios MD, 17 g at 05/26/22 1247    ondansetron (ZOFRAN ODT) tablet 4 mg, 4 mg, Oral, Q8H PRN **OR** ondansetron (ZOFRAN) injection 4 mg, 4 mg, IntraVENous, Q6H PRN, Mariluz De Dios MD    enoxaparin (LOVENOX) injection 40 mg, 40 mg, SubCUTAneous, DAILY, Mariluz De Dios MD    famotidine (PEPCID) tablet 20 mg, 20 mg, Oral, QPM, Olga Fink MD, 20 mg at 05/26/22 1804    methylPREDNISolone (PF) (SOLU-MEDROL) injection 40 mg, 40 mg, IntraVENous, Q8H, Olga Fink MD, 40 mg at 05/26/22 1557    Objective:     VITALS:   Patient Vitals for the past 12 hrs:   Temp Pulse Resp BP SpO2   05/26/22 1600 98.5 °F (36.9 °C) -- 18 105/71 97 %   05/26/22 1216 97.8 °F (36.6 °C) 90 20 (!) 172/82 96 %   05/26/22 1018 -- 78 -- (!) 187/82 --   05/26/22 0715 97.6 °F (36.4 °C) 78 20 (!) 187/82 96 %       Intake/Output Summary (Last 24 hours) at 5/26/2022 1908  Last data filed at 5/26/2022 1200  Gross per 24 hour   Intake 880 ml   Output --   Net 880 ml        PHYSICAL EXAM:  General: WD, WN. Alert, irritable, no acute distress    EENT:  EOMI. Anicteric sclerae. MMM  Resp:  Nonprod chain coughing, reduced excursion, mild wheezing,. No accessory muscle use  CV:  Regular  rhythm,  No edema  GI:  Soft, Non distended, Non tender. +Bowel sounds  Neurologic:  Alert and oriented X 3, normal speech, nonfocal  Psych:   Not anxious nor agitated  Skin:  No rashes. No jaundice    LABS:  I reviewed today's most current labs and imaging studies. Pertinent labs include:  Recent Labs     05/25/22  0537 05/24/22  1049   WBC 10.4 11.6*   HGB 12.0 13.2   HCT 35.9 39.0    276     Recent Labs     05/25/22  0537 05/24/22  1049    140   K 3.9 4.3    103   CO2 23 26   * 97   BUN 14 15   CREA 0.85 1.00   CA 8.7 9.2   MG 2.1  --    ALB  --  3.3*   TBILI  --  0.6   ALT  --  66     CULTURES  Sputum 5/24: no sample obtained  Paired [facility] 5/24: NG x 2d    Results for Jyoti Mckenzie (MRN [id number]) as of 5/25/2022 13:44   5/13/2022 13:17   Influenza A by PCR Not detected   Influenza B by PCR Not detected   SARS-COV-2 PCR Not detected     EKG: NSR 93 bpm, Leftward axis, NSC    RADIOLOGY:  pCXR 5/24:  A single portable AP upright view of the chest was obtained. Partially calcified  bilateral breast implants are present.  There is slight prominence of the  pulmonary interstitial markings. This is best appreciated in the region of the  right upper lobe. Very subtle hazy density is noted in this region. There is  some linear atelectatic change in the lateral aspect of the left lung base.   IMPRESSION  Evidence of very subtle increased density involving the right upper  lobe. Presence of minimal left basilar linear atelectasis. A follow-up chest  examination is recommended. CXR PA/LAT 5/26:  PA and lateral views of the chest are obtained. The cardiopericardial silhouette is within normal limits. There is no pleural  effusion, pneumothorax or focal consolidation present. Chronic interstitial and  parenchymal change with emphysematous change. Bilateral breast implants. Aortic  atherosclerotic change. Kyphosis.   IMPRESSION: No acute intrathoracic disease. CTA CHEST 5/24:  LUNGS: Severe emphysema. No suspicious nodules, mass. Mild increased septal  thickening groundglass opacification. IMPRESSION  1. No evidence of pulmonary embolus or right heart strain. 2.  Severe emphysema with possible mild pulmonary edema. Procedures: see electronic medical records for all procedures/Xrays and details which were not copied into this note but were reviewed prior to creation of Plan. Assessment / Plan:    Principal Problem:    COPD with acute exacerbation ([facility] [address] (5/24/2022)  Lingering symptoms over the past 2 months  Has been treated in the ED twice on April 4 and May 13  Seen by her pulmonary specialist at [facility] (Dr. Mai Session) on April 26  Was treated with tapered prednisone at each visit with only transient improvement  Continues home treatment for allergic rhinitis -Flonase/Singulair/Mucinex.   Hold Allegra to avoid dryness  Attempt sputum for gram stain and culture - has been too dry since adm (no sputum produced)  F/u CXR remains clear w/o infiltrate  Empiric antibiotic therapy with Rocephin/Azithromycin x5 days  Pulmicort/Brovana by neb twice daily  DuoNeb every 6 hours -  Due to dryness change to Albuterol tid  Solu-Medrol 40 mg every 8 hours  Codiclear bid - Due to dryness change to Robitussin plain q4h prn     Active Problems:    Essential hypertension (12/6/2020)  Continue current home treatment with Cozaar 100 mg daily  BP down this evening       GERD (gastroesophageal reflux disease) ()  Protonix PPI substitute for Nexium  Pepcid 20 mg q. Evening  Elevate head of bed       Uncomplicated alcohol dependence (HCC) (5/29/2020)  Chronic daily alcohol consumption  No prior history of DTs  Prescribing Ativan for anxiety as well       Herpes ()  No current symptoms  No need for Rx Valtrex         SAFETY:   Code Status:Full  DVT prophylaxis:Lovenox  Stress Ulcer prophylaxis: Protonix (home tx Nexium) and Pepcid q evening  Bladder catheter:no  Family Contact Info:  Primary Emergency Contact: Marques Crespo, Home Phone: [phone]  Bedded: [facility] Room ED06/06  Disposition: TBD, likely home 1-2 dats  Admission status:  Inpatient    Reviewed most current lab test results and cultures  YES  Reviewed most current radiology test results   YES  Review and summation of old records today    NO  Reviewed patient's current orders and MAR    YES  PMH/SH reviewed - no change compared to H&P    Care Plan discussed with:                                   Comments  Patient x     Family  x    RN x     Care Manager  x     Consultant                          x Multidiciplinary team rounds were held today with , nursing, pharmacist and clinical coordinator. Patient's plan of care was discussed; medications were reviewed and discharge planning was addressed.         ____________________________________________    Total NON Critical Care TIME:  35   Minutes        Comments   >50% of visit spent in counseling and coordination of care x      Signed: Zach Li MD  [facility] Hospitalist  [phone]

## 2022-05-26 NOTE — RT PROTOCOL NOTE
RT Nebulizer Bronchodilator Protocol Note    There is a bronchodilator order in the chart from a provider indicating to follow the RT Bronchodilator Protocol and there is an Initiate RT Bronchodilator Protocol order as well (see protocol at bottom of note). CXR Findings:  Recent Results (from the past 2 days)    XR CHEST PA LAT 05/26/2022    Impression  No acute intrathoracic disease. The findings from the last RT Protocol Assessment were as follows:  History of Pulmonary Disease: Chronic pulmonary disease  Respiratory Pattern: Regular pattern and RR 12-20 bpm  Breath Sounds: Slighly diminished and/or crackles  Cough: Strong, spontaneous, non-productive  Indication for Bronchodilator Therapy: On home bronchodilators  Bronchodilator Assessment Score: 4     Aerosolized bronchodilator medication orders have been revised according to the RT Nebulizer Bronchodilator Protocol below. Respiratory Therapist to perform RT Therapy Protocol Assessment initially then follow the protocol. Repeat RT Therapy Protocol Assessment PRN for score 0-3 or on second treatment, BID, and PRN for scores above 3. No Indications - adjust the frequency to every 6 hours PRN wheezing or bronchospasm, if no treatments needed after 48 hours then discontinue using Per Protocol order mode. If indication present, adjust the RT bronchodilator orders based on the Bronchodilator Assessment Score as indicated below. If a patient is on this medication at home then do not decrease Frequency below that used at home. 0-3 - enter or revise RT bronchodilator order(s) to equivalent RT Bronchodilator order with Frequency of every 4 hours PRN for wheezing or increased work of breathing using Per Protocol order mode.         4-6 - enter or revise RT Bronchodilator order(s) to two equivalent RT bronchodilator orders with one order with BID Frequency and one order with Frequency of every 4 hours PRN wheezing or increased work of breathing using Per Protocol order mode. 7-10 - enter or revise RT Bronchodilator order(s) to two equivalent RT bronchodilator orders with one order with TID Frequency and one order with Frequency of every 4 hours PRN wheezing or increased work of breathing using Per Protocol order mode. 11-13 - enter or revise RT Bronchodilator order(s) to one equivalent RT bronchodilator order with QID Frequency and an Albuterol order with Frequency of every 4 hours PRN wheezing or increased work of breathing using Per Protocol order mode. Greater than 13 - enter or revise RT Bronchodilator order(s) to one equivalent RT bronchodilator order with every 4 hours Frequency and an Albuterol order with Frequency of every 2 hours PRN wheezing or increased work of breathing using Per Protocol order mode. RT to enter RT Home Evaluation for COPD & MDI Assessment order using Per Protocol order mode.     Electronically signed by RT Kiko on 5/26/2022 at 7:59 PM

## 2022-05-26 NOTE — PROGRESS NOTES
Problem: Chronic Obstructive Pulmonary Disease (COPD)  Goal: *Oxygen saturation during activity within specified parameters  Outcome: Progressing Towards Goal     Problem: Falls - Risk of  Goal: *Absence of Falls  Description: Document Theodore Fall Risk and appropriate interventions in the flowsheet. Outcome: Progressing Towards Goal  Note: Fall Risk Interventions:            Medication Interventions: Teach patient to arise slowly                   Problem: Patient Education: Go to Patient Education Activity  Goal: Patient/Family Education  Outcome: Progressing Towards Goal     Problem: Hypertension  Goal: *Blood pressure within specified parameters  Outcome: Not Progressing Towards Goal    BP continues to fluctuate.

## 2022-05-26 NOTE — RT PROTOCOL NOTE
RT Nebulizer Bronchodilator Protocol Note    There is a bronchodilator order in the chart from a provider indicating to follow the RT Bronchodilator Protocol and there is an Initiate RT Bronchodilator Protocol order as well (see protocol at bottom of note). CXR Findings:  Recent Results (from the past 2 days)    XR CHEST PA LAT 05/26/2022    Impression  No acute intrathoracic disease. The findings from the last RT Protocol Assessment were as follows:  History of Pulmonary Disease: Chronic pulmonary disease  Respiratory Pattern: Dyspnea on exertion or RR 21-25 bpm  Breath Sounds: Intermittent or unilateral wheezes  Cough: Strong, spontaneous, non-productive  Indication for Bronchodilator Therapy: On home bronchodilators,Wheezing associated with pulmonary disorder  Bronchodilator Assessment Score: 8     Aerosolized bronchodilator medication orders have been revised according to the RT Nebulizer Bronchodilator Protocol below. Respiratory Therapist to perform RT Therapy Protocol Assessment initially then follow the protocol. Repeat RT Therapy Protocol Assessment PRN for score 0-3 or on second treatment, BID, and PRN for scores above 3. No Indications - adjust the frequency to every 6 hours PRN wheezing or bronchospasm, if no treatments needed after 48 hours then discontinue using Per Protocol order mode. If indication present, adjust the RT bronchodilator orders based on the Bronchodilator Assessment Score as indicated below. If a patient is on this medication at home then do not decrease Frequency below that used at home. 0-3 - enter or revise RT bronchodilator order(s) to equivalent RT Bronchodilator order with Frequency of every 4 hours PRN for wheezing or increased work of breathing using Per Protocol order mode.         4-6 - enter or revise RT Bronchodilator order(s) to two equivalent RT bronchodilator orders with one order with BID Frequency and one order with Frequency of every 4 hours PRN wheezing or increased work of breathing using Per Protocol order mode. 7-10 - enter or revise RT Bronchodilator order(s) to two equivalent RT bronchodilator orders with one order with TID Frequency and one order with Frequency of every 4 hours PRN wheezing or increased work of breathing using Per Protocol order mode. 11-13 - enter or revise RT Bronchodilator order(s) to one equivalent RT bronchodilator order with QID Frequency and an Albuterol order with Frequency of every 4 hours PRN wheezing or increased work of breathing using Per Protocol order mode. Greater than 13 - enter or revise RT Bronchodilator order(s) to one equivalent RT bronchodilator order with every 4 hours Frequency and an Albuterol order with Frequency of every 2 hours PRN wheezing or increased work of breathing using Per Protocol order mode. RT to enter RT Home Evaluation for COPD & MDI Assessment order using Per Protocol order mode. Electronically signed by RT Paresh on 5/26/2022 at 11:12 AMRT Nebulizer Bronchodilator Protocol Note    There is a bronchodilator order in the chart from a provider indicating to follow the RT Bronchodilator Protocol and there is an Initiate RT Bronchodilator Protocol order as well (see protocol at bottom of note). CXR Findings:  Recent Results (from the past 2 days)    XR CHEST PA LAT 05/26/2022    Impression  No acute intrathoracic disease. The findings from the last RT Protocol Assessment were as follows:  History of Pulmonary Disease: Chronic pulmonary disease  Respiratory Pattern: Regular pattern and RR 12-20 bpm  Breath Sounds: Intermittent or unilateral wheezes  Cough: Strong, spontaneous, non-productive  Indication for Bronchodilator Therapy: On home bronchodilators  Bronchodilator Assessment Score: 6     Aerosolized bronchodilator medication orders have been revised according to the RT Nebulizer Bronchodilator Protocol below.     Respiratory Therapist to perform RT Therapy Protocol Assessment initially then follow the protocol. Repeat RT Therapy Protocol Assessment PRN for score 0-3 or on second treatment, BID, and PRN for scores above 3. No Indications - adjust the frequency to every 6 hours PRN wheezing or bronchospasm, if no treatments needed after 48 hours then discontinue using Per Protocol order mode. If indication present, adjust the RT bronchodilator orders based on the Bronchodilator Assessment Score as indicated below. If a patient is on this medication at home then do not decrease Frequency below that used at home. 0-3 - enter or revise RT bronchodilator order(s) to equivalent RT Bronchodilator order with Frequency of every 4 hours PRN for wheezing or increased work of breathing using Per Protocol order mode. 4-6 - enter or revise RT Bronchodilator order(s) to two equivalent RT bronchodilator orders with one order with BID Frequency and one order with Frequency of every 4 hours PRN wheezing or increased work of breathing using Per Protocol order mode. 7-10 - enter or revise RT Bronchodilator order(s) to two equivalent RT bronchodilator orders with one order with TID Frequency and one order with Frequency of every 4 hours PRN wheezing or increased work of breathing using Per Protocol order mode. 11-13 - enter or revise RT Bronchodilator order(s) to one equivalent RT bronchodilator order with QID Frequency and an Albuterol order with Frequency of every 4 hours PRN wheezing or increased work of breathing using Per Protocol order mode. Greater than 13 - enter or revise RT Bronchodilator order(s) to one equivalent RT bronchodilator order with every 4 hours Frequency and an Albuterol order with Frequency of every 2 hours PRN wheezing or increased work of breathing using Per Protocol order mode. RT to enter RT Home Evaluation for COPD & MDI Assessment order using Per Protocol order mode.     Electronically signed by Jaime Vega, RT on 5/26/2022 at 11:11 AM

## 2022-05-27 VITALS
RESPIRATION RATE: 20 BRPM | SYSTOLIC BLOOD PRESSURE: 187 MMHG | HEART RATE: 72 BPM | OXYGEN SATURATION: 94 % | HEIGHT: 62 IN | TEMPERATURE: 97.8 F | BODY MASS INDEX: 23.55 KG/M2 | WEIGHT: 128 LBS | DIASTOLIC BLOOD PRESSURE: 82 MMHG

## 2022-05-27 PROBLEM — J44.1 COPD WITH ACUTE EXACERBATION (HCC): Status: RESOLVED | Noted: 2022-05-24 | Resolved: 2022-05-27

## 2022-05-27 LAB
ANION GAP SERPL CALC-SCNC: 11 MMOL/L (ref 5–15)
BASOPHILS # BLD: 0 K/UL (ref 0–0.1)
BASOPHILS NFR BLD: 0 % (ref 0–1)
BUN SERPL-MCNC: 20 MG/DL (ref 6–20)
BUN/CREAT SERPL: 22 (ref 12–20)
CALCIUM SERPL-MCNC: 8.6 MG/DL (ref 8.5–10.1)
CHLORIDE SERPL-SCNC: 105 MMOL/L (ref 97–108)
CO2 SERPL-SCNC: 24 MMOL/L (ref 21–32)
CREAT SERPL-MCNC: 0.9 MG/DL (ref 0.55–1.02)
DIFFERENTIAL METHOD BLD: ABNORMAL
EOSINOPHIL # BLD: 0 K/UL (ref 0–0.4)
EOSINOPHIL NFR BLD: 0 % (ref 0–7)
ERYTHROCYTE [DISTWIDTH] IN BLOOD BY AUTOMATED COUNT: 12.7 % (ref 11.5–14.5)
GLUCOSE SERPL-MCNC: 132 MG/DL (ref 65–100)
HCT VFR BLD AUTO: 36.4 % (ref 35–47)
HGB BLD-MCNC: 12.2 G/DL (ref 11.5–16)
IMM GRANULOCYTES # BLD AUTO: 0.1 K/UL (ref 0–0.04)
IMM GRANULOCYTES NFR BLD AUTO: 1 % (ref 0–0.5)
LYMPHOCYTES # BLD: 0.4 K/UL (ref 0.8–3.5)
LYMPHOCYTES NFR BLD: 3 % (ref 12–49)
MCH RBC QN AUTO: 34.6 PG (ref 26–34)
MCHC RBC AUTO-ENTMCNC: 33.5 G/DL (ref 30–36.5)
MCV RBC AUTO: 103.1 FL (ref 80–99)
MONOCYTES # BLD: 0.3 K/UL (ref 0–1)
MONOCYTES NFR BLD: 2 % (ref 5–13)
NEUTS SEG # BLD: 12.7 K/UL (ref 1.8–8)
NEUTS SEG NFR BLD: 94 % (ref 32–75)
NRBC # BLD: 0 K/UL (ref 0–0.01)
NRBC BLD-RTO: 0 PER 100 WBC
PLATELET # BLD AUTO: 264 K/UL (ref 150–400)
PMV BLD AUTO: 8.9 FL (ref 8.9–12.9)
POTASSIUM SERPL-SCNC: 4.2 MMOL/L (ref 3.5–5.1)
RBC # BLD AUTO: 3.53 M/UL (ref 3.8–5.2)
SODIUM SERPL-SCNC: 140 MMOL/L (ref 136–145)
WBC # BLD AUTO: 13.5 K/UL (ref 3.6–11)

## 2022-05-27 PROCEDURE — 85025 COMPLETE CBC W/AUTO DIFF WBC: CPT

## 2022-05-27 PROCEDURE — 74011250637 HC RX REV CODE- 250/637: Performed by: INTERNAL MEDICINE

## 2022-05-27 PROCEDURE — 74011000258 HC RX REV CODE- 258: Performed by: INTERNAL MEDICINE

## 2022-05-27 PROCEDURE — 36415 COLL VENOUS BLD VENIPUNCTURE: CPT

## 2022-05-27 PROCEDURE — 74011000250 HC RX REV CODE- 250: Performed by: INTERNAL MEDICINE

## 2022-05-27 PROCEDURE — 77010033678 HC OXYGEN DAILY

## 2022-05-27 PROCEDURE — 94761 N-INVAS EAR/PLS OXIMETRY MLT: CPT

## 2022-05-27 PROCEDURE — 74011250636 HC RX REV CODE- 250/636: Performed by: INTERNAL MEDICINE

## 2022-05-27 PROCEDURE — 94640 AIRWAY INHALATION TREATMENT: CPT

## 2022-05-27 PROCEDURE — 80048 BASIC METABOLIC PNL TOTAL CA: CPT

## 2022-05-27 RX ORDER — GUAIFENESIN 600 MG/1
600 TABLET, EXTENDED RELEASE ORAL 2 TIMES DAILY
Qty: 30 TABLET | Refills: 0 | Status: SHIPPED | OUTPATIENT
Start: 2022-05-27

## 2022-05-27 RX ORDER — HYDRALAZINE HYDROCHLORIDE 20 MG/ML
10 INJECTION INTRAMUSCULAR; INTRAVENOUS ONCE
Status: DISCONTINUED | OUTPATIENT
Start: 2022-05-27 | End: 2022-05-27 | Stop reason: HOSPADM

## 2022-05-27 RX ORDER — ZOLPIDEM TARTRATE 5 MG/1
5 TABLET ORAL
Qty: 20 TABLET | Refills: 2 | Status: SHIPPED | OUTPATIENT
Start: 2022-05-27

## 2022-05-27 RX ADMIN — METHYLPREDNISOLONE SODIUM SUCCINATE 40 MG: 40 INJECTION, POWDER, FOR SOLUTION INTRAMUSCULAR; INTRAVENOUS at 06:12

## 2022-05-27 RX ADMIN — LOSARTAN POTASSIUM 100 MG: 100 TABLET, FILM COATED ORAL at 09:10

## 2022-05-27 RX ADMIN — HYDROCODONE BITARTRATE AND ACETAMINOPHEN 1 TABLET: 5; 325 TABLET ORAL at 06:12

## 2022-05-27 RX ADMIN — GUAIFENESIN 600 MG: 600 TABLET, EXTENDED RELEASE ORAL at 09:10

## 2022-05-27 RX ADMIN — ALBUTEROL SULFATE 2.5 MG: 2.5 SOLUTION RESPIRATORY (INHALATION) at 07:44

## 2022-05-27 RX ADMIN — PANTOPRAZOLE SODIUM 40 MG: 40 TABLET, DELAYED RELEASE ORAL at 06:12

## 2022-05-27 RX ADMIN — AZITHROMYCIN DIHYDRATE 500 MG: 500 INJECTION, POWDER, LYOPHILIZED, FOR SOLUTION INTRAVENOUS at 11:36

## 2022-05-27 RX ADMIN — ARFORMOTEROL TARTRATE: 15 SOLUTION RESPIRATORY (INHALATION) at 08:00

## 2022-05-27 RX ADMIN — CEFTRIAXONE SODIUM 2 G: 2 INJECTION, POWDER, FOR SOLUTION INTRAMUSCULAR; INTRAVENOUS at 11:02

## 2022-05-27 RX ADMIN — FLUTICASONE PROPIONATE 2 SPRAY: 50 SPRAY, METERED NASAL at 11:02

## 2022-05-27 NOTE — PROGRESS NOTES
Problem: Chronic Obstructive Pulmonary Disease (COPD)  Goal: *Oxygen saturation during activity within specified parameters  Outcome: Progressing Towards Goal  Goal: *Absence of hypoxia  Outcome: Progressing Towards Goal     Problem: Falls - Risk of  Goal: *Absence of Falls  Description: Document Theodore Fall Risk and appropriate interventions in the flowsheet.   Outcome: Progressing Towards Goal  Note: Fall Risk Interventions:            Medication Interventions: Teach patient to arise slowly

## 2022-05-27 NOTE — PROGRESS NOTES
Bedside shift change report given to Enmanuel Ayala (oncoming nurse) by Emily Govea (offgoing nurse). Report included the following information SBAR, Kardex and MAR.

## 2022-05-27 NOTE — PROGRESS NOTES
Problem: Chronic Obstructive Pulmonary Disease (COPD)  Goal: *Oxygen saturation during activity within specified parameters  Outcome: Progressing Towards Goal  Goal: *Able to remain out of bed as prescribed  Outcome: Progressing Towards Goal  Goal: *Absence of hypoxia  Outcome: Progressing Towards Goal  Goal: *Optimize nutritional status  Outcome: Progressing Towards Goal     Problem: Patient Education: Go to Patient Education Activity  Goal: Patient/Family Education  Outcome: Progressing Towards Goal     Problem: Hypertension  Goal: *Blood pressure within specified parameters  Outcome: Progressing Towards Goal  Goal: *Labs within defined limits  Outcome: Progressing Towards Goal     Problem: Patient Education: Go to Patient Education Activity  Goal: Patient/Family Education  Outcome: Progressing Towards Goal     Problem: Falls - Risk of  Goal: *Absence of Falls  Description: Document Theodore Fall Risk and appropriate interventions in the flowsheet.   Outcome: Progressing Towards Goal  Note: Fall Risk Interventions:            Medication Interventions: Evaluate medications/consider consulting pharmacy,Patient to call before getting OOB                   Problem: Patient Education: Go to Patient Education Activity  Goal: Patient/Family Education  Outcome: Progressing Towards Goal

## 2022-05-27 NOTE — DISCHARGE INSTRUCTIONS
DISCHARGE SUMMARY from Nurse    PATIENT INSTRUCTIONS:    What to do at Home:  Recommended activity: Activity as tolerated,     If you experience any recurrent symptoms , please follow up with PCP. *  Please give a list of your current medications to your Primary Care Provider. *  Please update this list whenever your medications are discontinued, doses are      changed, or new medications (including over-the-counter products) are added. *  Please carry medication information at all times in case of emergency situations. These are general instructions for a healthy lifestyle:    No smoking/ No tobacco products/ Avoid exposure to second hand smoke  Surgeon General's Warning:  Quitting smoking now greatly reduces serious risk to your health. Obesity, smoking, and sedentary lifestyle greatly increases your risk for illness    A healthy diet, regular physical exercise & weight monitoring are important for maintaining a healthy lifestyle    You may be retaining fluid if you have a history of heart failure or if you experience any of the following symptoms:  Weight gain of 3 pounds or more overnight or 5 pounds in a week, increased swelling in our hands or feet or shortness of breath while lying flat in bed. Please call your doctor as soon as you notice any of these symptoms; do not wait until your next office visit. The discharge information has been reviewed with the patient. The patient verbalized understanding. Discharge medications reviewed with the patient and appropriate educational materials and side effects teaching were provided.   ___________________________________________________________________________________________________________________________________

## 2022-05-27 NOTE — PROGRESS NOTES
Bedside and Verbal shift change report given to TIM Palencia RN (oncoming nurse) by Isra Olivia RN   (offgoing nurse). Report included the following information SBAR, Kardex, Intake/Output, MAR, Accordion, Recent Results and Med Rec Status. Witt score 1  Bed/chair alarm no in use. If in use it is set at the highest volume. Intravenous fluids were administered, IV antibiotics Azithromycin and Rocephin  Patient Vitals for the past 12 hrs:   Temp Pulse Resp BP SpO2   05/26/22 1953 -- -- -- -- 98 %   05/26/22 1600 98.5 °F (36.9 °C) -- 18 105/71 97 %   05/26/22 1216 97.8 °F (36.6 °C) 90 20 (!) 172/82 96 %   05/26/22 1018 -- 78 -- (!) 187/82 --     No flowsheet data found. All lab results for the last 24 hours reviewed.

## 2022-05-27 NOTE — PROGRESS NOTES
Problem: Chronic Obstructive Pulmonary Disease (COPD)  Goal: *Oxygen saturation during activity within specified parameters  Outcome: Progressing Towards Goal  Goal: *Able to remain out of bed as prescribed  Outcome: Progressing Towards Goal  Goal: *Absence of hypoxia  Outcome: Progressing Towards Goal  Goal: *Optimize nutritional status  Outcome: Progressing Towards Goal     Problem: Patient Education: Go to Patient Education Activity  Goal: Patient/Family Education  Outcome: Progressing Towards Goal     Problem: Hypertension  Goal: *Blood pressure within specified parameters  Outcome: Progressing Towards Goal     Problem: Hypertension  Goal: *Blood pressure within specified parameters  Outcome: Progressing Towards Goal  Goal: *Labs within defined limits  Outcome: Progressing Towards Goal     Problem: Patient Education: Go to Patient Education Activity  Goal: Patient/Family Education  Outcome: Progressing Towards Goal     Problem: Falls - Risk of  Goal: *Absence of Falls  Description: Document Theodore Fall Risk and appropriate interventions in the flowsheet.   Outcome: Progressing Towards Goal  Note: Fall Risk Interventions:            Medication Interventions: Evaluate medications/consider consulting pharmacy,Patient to call before getting OOB                   Problem: Patient Education: Go to Patient Education Activity  Goal: Patient/Family Education  Outcome: Progressing Towards Goal

## 2022-05-27 NOTE — ROUTINE PROCESS
Discharge instructions reviewed with patient  Personal belongings returned: na  Home meds returned: n/a  To front entrance via wheelchair  Discharged home with   @ [phone]

## 2022-05-27 NOTE — PROGRESS NOTES
Transition of Care (WILDER) Plan:   Patient is being discharged home. No discharge needs identified and declined the need for home health. RUR: 13%    WILDER Transportation:       How is patient being transported at discharge? Spouse/POV     When?  5/27/22     Is transport scheduled? NA    Follow-up appointment and transportation:     PCP? Angelia Lamar MD     Specialist?     Time/Date? 6/2/2022  2:40PM     Who is transporting to the follow-up appointment? Spouse/POV      Is transport for follow up appointment scheduled? NA    Communication plan (with patient/family): Who is being called? Patient       What number(s) is to be used? [phone] (Mobile      What service provider is calling for [facility] services? PCP office      When are they calling? Probably 24 - 48 hours prior to appointment      Click here to [address] including selection of the Healthcare Decision Maker Relationship (ie \"Primary\")  @[email or web address]    Care Management Interventions  PCP Verified by CM:  Yes Angelia Lamar MD)  Last Visit to PCP: 04/29/22  Palliative Care Criteria Met (RRAT>21 & CHF Dx)?: No (No MD order for Palliative Care)  Mode of Transport at Discharge: Self (Spouse/POV)  Transition of Care Consult (CM Consult): Discharge Planning  Discharge Durable Medical Equipment: No  Physical Therapy Consult: No  Occupational Therapy Consult: No  Speech Therapy Consult: No  Support Systems: Spouse/Significant Other  Confirm Follow Up Transport: Family  The Plan for Transition of Care is Related to the Following Treatment Goals : Treat COPD exacerbation  Discharge Location  Patient Expects to be Discharged to[de-identified] Home

## 2022-05-27 NOTE — H&P
Hospitalist Discharge Summary     Patient ID:  Vanessa Reeves  [id number]  87 y.o.  1945 5/24/2022    PCP on record: Keyshawn Courtney MD    Admit date: 5/24/2022  Discharge date and time: 5/27/2022    DISCHARGE DIAGNOSIS:  COPD exacerbation resolved      CONSULTATIONS:  None    Excerpted HPI from H&P of Dinah Briones MD:   65 y.o. female presenting for admission to [facility] for further evaluation and treatment for COPD with acute exacerbation ([facility] [address] She  has a past medical history of Chronic obstructive pulmonary disease ([facility] [address] Environmental and seasonal allergies, GERD (gastroesophageal reflux disease), Herpes, colonoscopy (05/22/2017), Hypertension, and OA (osteoarthritis). She has no past medical history of Endocrine disorder. .      Patient with longstanding COPD on home oxygen presenting back to the ED with ongoing complaints of cough and shortness of breath. He was seen in the ED April 1 and prescribed tapered prednisone and as needed Ativan to continue her Anoro MDI and albuterol neb. She was seen in follow-up by Dr. Tiana Manzo, Pulmonary Medicine [facility] and was prescribed a longer tapered course of prednisone over 10 days. She more recently presented to the ED on May 13 with recurrent symptoms and was treated for suspected viral URI with tapered prednisone beginning at 30 mg over 1 week. She has exacerbated symptoms over the past few days following the taper. She has continued the Anora and albuterol nebs. She has continued her other routine respiratory meds including Singulair, Flonase, Allegra and Mucinex. Has known peptic reflux that is treated daily with Nexium. Due to persistent symptoms she was assessed with a pulmonary angiogram today that was negative for infiltrate, mass, or PE.     She has been admitted here previously in 2020 as well as 2021 for respiratory problems. She has had multiple negative COVID screens over the past 2 years-the last being at the ED visit 5/13.   Has not apparently taken the COVID-vaccine. Last recorded influenza vaccine October 2020. Pneumococcal conjugate vaccine November 2016.       ______________________________________________________________________  DISCHARGE SUMMARY/HOSPITAL COURSE:    The patient with the above history was admitted and managed as a case of COPD exacerbation  Failed out patient treatment and duarte treatments for her chronic medical conditions   Continued: Details of management were as below:   COPD with acute exacerbation ([address] (5/24/2022)  Lingering symptoms over the past 2 months  Has been treated in the ED twice on April 4 and May 13  Seen by her pulmonary specialist at [facility] (Dr. Kaylee You) on April 26  Was treated with tapered prednisone at each visit with only transient improvement  Continues home treatment for allergic rhinitis -Flonase/Singulair/Mucinex. Hold Allegra to avoid dryness  Attempt sputum for gram stain and culture  Empiric antibiotic therapy with Rocephin/Azithromycin x5 days  Pulmicort/Brovana by neb twice daily  DuoNeb every 6 hours  Solu-Medrol 40 mg every 8 hours  Plan follow-up chest x-ray to rule out infiltrate     Active Problems:    Essential hypertension (12/6/2020)  Continue current home treatment with Cozaar 100 mg daily       GERD (gastroesophageal reflux disease) ()  Protonix PPI substitute for Nexium  Pepcid 20 mg q. Evening  Elevate head of bed       Uncomplicated alcohol dependence (HCC) (5/29/2020)  Chronic daily alcohol consumption  No prior history of DTs  Prescribing Ativan for anxiety as well       Herpes ()  No current symptoms  Current need for Valtrex. The  ptient on the morning of discharge was seen at the bedside, she I sback to her baseline and her ough, sob and wheezing has improved. She is breathing wel on O2 via nasal cannula. She has no subjective ofr documented fever. Chest no wheezing.     Plan She was up dated of her status and agreed on a plan to be discharged home , continue with her usual treatments at home. She has complaints of problem and asked for sleep aid; she was given Ambien 5 mg tab to be taken PRN, Also advised on side effects and precautions to take and avoid taking  Ambien daily. ___________________________________________________________________  Patient seen and examined by me on discharge day. Pertinent Findings:  Gen:    Not in distress  Chest: Clear lungs  CVS:   Regular rhythm. No edema  Abd:  Soft, not distended, not tender  Neuro:  Alert, AOX3. LABS:  Pertinent labs include:  Recent Labs     05/27/22 0514 05/25/22  0537   WBC 13.5* 10.4   HGB 12.2 12.0   HCT 36.4 35.9    262     Recent Labs     05/27/22 0514 05/25/22  0537    140   K 4.2 3.9    104   CO2 24 23   * 153*   BUN 20 14   CREA 0.90 0.85   CA 8.6 8.7   MG  --  2.1       IMAGING:  No results found. EKG:   unchanged from previous tracings. _______________________________________________________________________  DISCHARGE MEDICATIONS:   Current Discharge Medication List      START taking these medications    Details   zolpidem (Ambien) 5 mg tablet Take 1 Tablet by mouth nightly as needed for Sleep. Max Daily Amount: 5 mg. Qty: 20 Tablet, Refills: 2  Start date: 5/27/2022    Associated Diagnoses: Primary insomnia         CONTINUE these medications which have CHANGED    Details   guaiFENesin ER (Mucinex) 600 mg ER tablet Take 1 Tablet by mouth two (2) times a day. Qty: 30 Tablet, Refills: 0  Start date: 5/27/2022         CONTINUE these medications which have NOT CHANGED    Details   fexofenadine (ALLEGRA) 180 mg tablet Take 180 mg by mouth daily. umeclidinium-vilanteroL (ANORO ELLIPTA) 62.5-25 mcg/actuation inhaler Take 1 Puff by inhalation daily. losartan (COZAAR) 50 mg tablet Take 100 mg by mouth daily. 2 tabs per day       albuterol (PROVENTIL HFA, VENTOLIN HFA, PROAIR HFA) 90 mcg/actuation inhaler Take 2 Puffs by inhalation every four (4) hours as needed.       esomeprazole ([employer]) 20 mg capsule Take 20 mg by mouth. albuterol-ipratropium (DUO-NEB) 2.5 mg-0.5 mg/3 ml nebu 3 mL by Nebulization route every four (4) hours as needed for Wheezing. Qty: 30 Nebule, Refills: 1      montelukast (SINGULAIR) 10 mg tablet Take 10 mg by mouth At bedtime. atorvastatin (LIPITOR) 10 mg tablet Take 20 mg by mouth daily. multivitamin (MULTIPLE VITAMINS PO) Take  by mouth. fluticasone (FLONASE) 50 mcg/actuation nasal spray One spray per nostril twice a day  Indications: ALLERGIC RHINITIS  Qty: 1 Bottle, Refills: 5      Tobradex ophthalmic ointment APPLY TO EACH EYE AT BEDTIME AS NEEDED      Oxygen 1 Each by Nasal route daily as needed. Uses one liter of oxygen as needed      valACYclovir (VALTREX) 500 mg tablet 1 pill twice daily for 5 days if needed for outbreak      naproxen sodium (ALEVE) 220 mg cap Take  by mouth. STOP taking these medications       predniSONE (STERAPRED DS) 10 mg dose pack Comments:   Reason for Stopping:         LORazepam (ATIVAN) 0.5 mg tablet Comments:   Reason for Stopping:         dilTIAZem ER (CARDIZEM CD) 180 mg capsule Comments:   Reason for Stopping:         Daliresp 500 mcg tab tablet Comments:   Reason for Stopping:         azithromycin (Zithromax Z-Tavares) 250 mg tablet Comments:   Reason for Stopping:         levoFLOXacin (LEVAQUIN) 750 mg tablet Comments:   Reason for Stopping:         sodium chloride 3 % nebulizer solution Comments:   Reason for Stopping:         potassium chloride SR (K-TAB) 20 mEq tablet Comments:   Reason for Stopping:         amLODIPine (NORVASC) 5 mg tablet Comments:   Reason for Stopping:         loratadine (CLARITIN) 10 mg tablet Comments:   Reason for Stopping:                 Patient Follow Up Instructions:    Activity: Activity as tolerated  Diet: Regular Diet  Wound Care: None needed    Follow-up with PCP and pulmonary specialist  in 1 wwek    Follow-up tests/labs None  Follow-up Information     Follow up With Specialties Details Why Contact Info    Yolanda Chow MD Family [facility]   [address] [facility]  [phone]          ________________________________________________________________    Risk of deterioration: Low    Condition at Discharge:  Stable  __________________________________________________________________    Disposition  Home with family, no needs, she already has home oxygen.     ____________________________________________________________________    Code Status: Full Code  ___________________________________________________________________      Total time in minutes spent coordinating this discharge (includes going over instructions, follow-up, prescriptions, and preparing report for sign off to her PCP) :  35 minutes    Signed:  Rigoberto Navarrete MD

## 2022-05-29 ENCOUNTER — APPOINTMENT (OUTPATIENT)
Dept: GENERAL RADIOLOGY | Age: 77
End: 2022-05-29
Attending: EMERGENCY MEDICINE
Payer: MEDICARE

## 2022-05-29 ENCOUNTER — HOSPITAL ENCOUNTER (EMERGENCY)
Age: 77
Discharge: HOME | End: 2022-05-29
Attending: EMERGENCY MEDICINE
Payer: MEDICARE

## 2022-05-29 VITALS
HEART RATE: 108 BPM | OXYGEN SATURATION: 95 % | BODY MASS INDEX: 23.92 KG/M2 | SYSTOLIC BLOOD PRESSURE: 145 MMHG | RESPIRATION RATE: 19 BRPM | DIASTOLIC BLOOD PRESSURE: 87 MMHG | WEIGHT: 130 LBS | HEIGHT: 62 IN | TEMPERATURE: 97.8 F

## 2022-05-29 DIAGNOSIS — E86.0 DEHYDRATION: ICD-10-CM

## 2022-05-29 DIAGNOSIS — R19.7 DIARRHEA, UNSPECIFIED TYPE: Primary | ICD-10-CM

## 2022-05-29 DIAGNOSIS — R00.0 TACHYCARDIA: ICD-10-CM

## 2022-05-29 LAB
ALBUMIN SERPL-MCNC: 3.8 G/DL (ref 3.5–5)
ALBUMIN/GLOB SERPL: 1 {RATIO} (ref 1.1–2.2)
ALP SERPL-CCNC: 94 U/L (ref 45–117)
ALT SERPL-CCNC: 106 U/L (ref 12–78)
ANION GAP SERPL CALC-SCNC: 17 MMOL/L (ref 5–15)
AST SERPL-CCNC: 39 U/L (ref 15–37)
BASOPHILS # BLD: 0 K/UL (ref 0–0.1)
BASOPHILS NFR BLD: 0 % (ref 0–1)
BILIRUB SERPL-MCNC: 1.1 MG/DL (ref 0.2–1)
BUN SERPL-MCNC: 16 MG/DL (ref 6–20)
BUN/CREAT SERPL: 15 (ref 12–20)
CALCIUM SERPL-MCNC: 9.5 MG/DL (ref 8.5–10.1)
CHLORIDE SERPL-SCNC: 102 MMOL/L (ref 97–108)
CO2 SERPL-SCNC: 22 MMOL/L (ref 21–32)
CREAT SERPL-MCNC: 1.07 MG/DL (ref 0.55–1.02)
DIFFERENTIAL METHOD BLD: ABNORMAL
EOSINOPHIL # BLD: 0.3 K/UL (ref 0–0.4)
EOSINOPHIL NFR BLD: 3 % (ref 0–7)
ERYTHROCYTE [DISTWIDTH] IN BLOOD BY AUTOMATED COUNT: 12.7 % (ref 11.5–14.5)
GLOBULIN SER CALC-MCNC: 3.9 G/DL (ref 2–4)
GLUCOSE SERPL-MCNC: 97 MG/DL (ref 65–100)
HCT VFR BLD AUTO: 50.7 % (ref 35–47)
HGB BLD-MCNC: 17.1 G/DL (ref 11.5–16)
IMM GRANULOCYTES # BLD AUTO: 0.1 K/UL (ref 0–0.04)
IMM GRANULOCYTES NFR BLD AUTO: 1 % (ref 0–0.5)
LACTATE SERPL-SCNC: 1.1 MMOL/L (ref 0.4–2)
LYMPHOCYTES # BLD: 1.4 K/UL (ref 0.8–3.5)
LYMPHOCYTES NFR BLD: 10 % (ref 12–49)
MAGNESIUM SERPL-MCNC: 2.2 MG/DL (ref 1.6–2.4)
MCH RBC QN AUTO: 34.2 PG (ref 26–34)
MCHC RBC AUTO-ENTMCNC: 33.7 G/DL (ref 30–36.5)
MCV RBC AUTO: 101.4 FL (ref 80–99)
MONOCYTES # BLD: 1 K/UL (ref 0–1)
MONOCYTES NFR BLD: 7 % (ref 5–13)
NEUTS SEG # BLD: 11 K/UL (ref 1.8–8)
NEUTS SEG NFR BLD: 79 % (ref 32–75)
NRBC # BLD: 0 K/UL (ref 0–0.01)
NRBC BLD-RTO: 0 PER 100 WBC
PHOSPHATE SERPL-MCNC: 4.2 MG/DL (ref 2.6–4.7)
PLATELET # BLD AUTO: 348 K/UL (ref 150–400)
PMV BLD AUTO: 9 FL (ref 8.9–12.9)
POTASSIUM SERPL-SCNC: 3.6 MMOL/L (ref 3.5–5.1)
PROT SERPL-MCNC: 7.7 G/DL (ref 6.4–8.2)
RBC # BLD AUTO: 5 M/UL (ref 3.8–5.2)
SODIUM SERPL-SCNC: 141 MMOL/L (ref 136–145)
TROPONIN-HIGH SENSITIVITY: 9 NG/L (ref 0–51)
WBC # BLD AUTO: 13.9 K/UL (ref 3.6–11)

## 2022-05-29 PROCEDURE — 84484 ASSAY OF TROPONIN QUANT: CPT

## 2022-05-29 PROCEDURE — 74011250636 HC RX REV CODE- 250/636: Performed by: EMERGENCY MEDICINE

## 2022-05-29 PROCEDURE — 87040 BLOOD CULTURE FOR BACTERIA: CPT

## 2022-05-29 PROCEDURE — 36415 COLL VENOUS BLD VENIPUNCTURE: CPT

## 2022-05-29 PROCEDURE — 96374 THER/PROPH/DIAG INJ IV PUSH: CPT

## 2022-05-29 PROCEDURE — 99285 EMERGENCY DEPT VISIT HI MDM: CPT

## 2022-05-29 PROCEDURE — 96360 HYDRATION IV INFUSION INIT: CPT

## 2022-05-29 PROCEDURE — 83735 ASSAY OF MAGNESIUM: CPT

## 2022-05-29 PROCEDURE — 80053 COMPREHEN METABOLIC PANEL: CPT

## 2022-05-29 PROCEDURE — 83605 ASSAY OF LACTIC ACID: CPT

## 2022-05-29 PROCEDURE — 96375 TX/PRO/DX INJ NEW DRUG ADDON: CPT

## 2022-05-29 PROCEDURE — 71045 X-RAY EXAM CHEST 1 VIEW: CPT

## 2022-05-29 PROCEDURE — 85025 COMPLETE CBC W/AUTO DIFF WBC: CPT

## 2022-05-29 PROCEDURE — 84100 ASSAY OF PHOSPHORUS: CPT

## 2022-05-29 PROCEDURE — 93005 ELECTROCARDIOGRAM TRACING: CPT

## 2022-05-29 RX ORDER — MORPHINE SULFATE 4 MG/ML
4 INJECTION INTRAVENOUS ONCE
Status: COMPLETED | OUTPATIENT
Start: 2022-05-29 | End: 2022-05-29

## 2022-05-29 RX ORDER — SODIUM CHLORIDE 0.9 % (FLUSH) 0.9 %
5-10 SYRINGE (ML) INJECTION AS NEEDED
Status: DISCONTINUED | OUTPATIENT
Start: 2022-05-29 | End: 2022-05-29 | Stop reason: HOSPADM

## 2022-05-29 RX ORDER — ONDANSETRON 2 MG/ML
8 INJECTION INTRAMUSCULAR; INTRAVENOUS ONCE
Status: COMPLETED | OUTPATIENT
Start: 2022-05-29 | End: 2022-05-29

## 2022-05-29 RX ADMIN — MORPHINE SULFATE 4 MG: 4 INJECTION INTRAVENOUS at 14:39

## 2022-05-29 RX ADMIN — ONDANSETRON 8 MG: 2 INJECTION INTRAMUSCULAR; INTRAVENOUS at 14:39

## 2022-05-29 RX ADMIN — SODIUM CHLORIDE 1770 ML: 9 INJECTION, SOLUTION INTRAVENOUS at 14:35

## 2022-05-29 NOTE — ED PROVIDER NOTES
EMERGENCY DEPARTMENT HISTORY AND PHYSICAL EXAM          Date: 5/29/2022  Patient Name: Delbert Lofton    History of Presenting Illness     Chief Complaint   Patient presents with    Diarrhea       History Provided By: Patient    HPI: Delbert Lofton is a 65 y.o. female, pmhx COPD on home oxygen, pretension and GERD who presents via private auto to the ED c/o diarrhea    Was recently admitted to the hospital on May 24 for COPD exacerbation. She was discharged on the 27th and notes she was doing okay until today when she woke up with profuse diarrhea. She states she stooled at least 30 times today. It is a bright yellow, nonodorous stool that is mostly just liquid now. She denies any fevers or chills with her symptoms but has diffuse allover abdominal cramping. She denies any cramping in her legs as well as any shortness of breath or problem with her cough. Patient specifically denies any recent fevers, chills, nausea, vomiting, CP, SOB, urinary sxs or headache. PCP: Cuate Pope MD    Allergies: multiple  There are no other complaints, changes, or physical findings at this time. Current Outpatient Medications   Medication Sig Dispense Refill    zolpidem (Ambien) 5 mg tablet Take 1 Tablet by mouth nightly as needed for Sleep. Max Daily Amount: 5 mg. 20 Tablet 2    guaiFENesin ER (Mucinex) 600 mg ER tablet Take 1 Tablet by mouth two (2) times a day. 30 Tablet 0    fexofenadine (ALLEGRA) 180 mg tablet Take 180 mg by mouth daily.  umeclidinium-vilanteroL (ANORO ELLIPTA) 62.5-25 mcg/actuation inhaler Take 1 Puff by inhalation daily.  losartan (COZAAR) 50 mg tablet Take 100 mg by mouth daily. 2 tabs per day       albuterol (PROVENTIL HFA, VENTOLIN HFA, PROAIR HFA) 90 mcg/actuation inhaler Take 2 Puffs by inhalation every four (4) hours as needed.  esomeprazole (NEXIUM) 20 mg capsule Take 20 mg by mouth.       Tobradex ophthalmic ointment APPLY TO EACH EYE AT BEDTIME AS NEEDED  Oxygen 1 Each by Nasal route daily as needed. Uses one liter of oxygen as needed      valACYclovir (VALTREX) 500 mg tablet 1 pill twice daily for 5 days if needed for outbreak      montelukast (SINGULAIR) 10 mg tablet Take 10 mg by mouth At bedtime.  atorvastatin (LIPITOR) 10 mg tablet Take 20 mg by mouth daily.  multivitamin (MULTIPLE VITAMINS PO) Take  by mouth.  fluticasone (FLONASE) 50 mcg/actuation nasal spray One spray per nostril twice a day  Indications: ALLERGIC RHINITIS 1 Bottle 5    naproxen sodium (ALEVE) 220 mg cap Take  by mouth. Past History     Past Medical History:  Past Medical History:   Diagnosis Date    Chronic obstructive pulmonary disease ([address]     Environmental and seasonal allergies     GERD (gastroesophageal reflux disease)     Herpes     Genital    Hx of colonoscopy 2017    Hypertension     OA (osteoarthritis)        Past Surgical History:  Past Surgical History:   Procedure Laterality Date    HX BREAST AUGMENTATION      HX CATARACT REMOVAL Bilateral     HX COLONOSCOPY      [facility]    HX COLONOSCOPY  2017    nl    HX ORTHOPAEDIC Left 10/14/2016    left foot planter fibroma    HX ORTHOPAEDIC      right hand fibroma    HX TUBAL LIGATION      IMPLANT BREAST SILICONE/EQ         Family History:  Family History   Problem Relation Age of Onset    Heart Disease Father     Hypertension Mother     Liver Disease Brother         Hepatitis C    Heart Failure Brother        Social History:  Social History     Tobacco Use    Smoking status: Former Smoker     Quit date: 1995     Years since quittin.0    Smokeless tobacco: Never Used   Substance Use Topics    Alcohol use: Yes     Alcohol/week: 63.0 standard drinks     Types: 21 Glasses of wine, 42 Shots of liquor per week     Comment: 2 drinks per night    Drug use: No       Allergies:   Allergies   Allergen Reactions    Daliresp [Roflumilast] Diarrhea    Levofloxacin Diarrhea     Diarrhea and stomach cramps    Maxzide [Triamterene-Hydrochlorothiazid] Rash    Pcn [Penicillins] Unknown (comments)     Review of Systems   Review of Systems   Constitutional: Negative for activity change, appetite change, chills, fever and unexpected weight change. HENT: Negative for congestion. Eyes: Negative for pain and visual disturbance. Respiratory: Negative for cough and shortness of breath. Cardiovascular: Negative for chest pain. Gastrointestinal: Positive for abdominal pain, diarrhea and nausea. Negative for vomiting. Genitourinary: Negative for dysuria. Musculoskeletal: Negative for back pain. Skin: Negative for rash. Neurological: Negative for headaches. Physical Exam   Physical Exam  Vitals and nursing note reviewed. Constitutional:       Appearance: She is well-developed. She is not diaphoretic. Comments: This is a thin elderly female who is quite anxious and agitated, with elevated heart rate and blood pressure   HENT:      Head: Normocephalic and atraumatic. Eyes:      General:         Right eye: No discharge. Left eye: No discharge. Conjunctiva/sclera: Conjunctivae normal.      Pupils: Pupils are equal, round, and reactive to light. Cardiovascular:      Rate and Rhythm: Regular rhythm. Tachycardia present. Heart sounds: Normal heart sounds. No murmur heard. No friction rub. No gallop. Pulmonary:      Effort: Pulmonary effort is normal. No respiratory distress. Breath sounds: Normal breath sounds. No wheezing or rales. Abdominal:      General: Bowel sounds are normal. There is no distension. Palpations: Abdomen is soft. Tenderness: There is no abdominal tenderness. There is no guarding or rebound. Musculoskeletal:         General: Normal range of motion. Cervical back: Normal range of motion and neck supple. Right lower leg: No edema. Left lower leg: No edema.    Skin:     General: Skin is warm and dry. Capillary Refill: Capillary refill takes less than 2 seconds. Findings: No rash. Neurological:      Mental Status: She is alert and oriented to person, place, and time. Cranial Nerves: No cranial nerve deficit. Motor: No abnormal muscle tone. Diagnostic Study Results     Labs -   No results found for this or any previous visit (from the past 12 hour(s)). Radiologic Studies -   XR CHEST PORT   Final Result   Clear lungs. CT Results  (Last 48 hours)    None        CXR Results  (Last 48 hours)               05/29/22 1419  XR CHEST PORT Final result    Impression:  Clear lungs. Narrative:  PORTABLE CHEST RADIOGRAPH/S: 5/29/2022 2:19 PM       INDICATION: Pneumonia. COMPARISON: 5/26/2022. TECHNIQUE: Portable frontal upright radiograph/s of the chest.       FINDINGS:    Aside from subsegmental atelectasis in the left lower lung field, the lungs are   clear. The central airways are patent. No pneumothorax or pleural effusion. Incidental note is made of rim calcified breast implants. Medical Decision Making   I am the first provider for this patient. I reviewed the vital signs, available nursing notes, past medical history, past surgical history, family history and social history. Vital Signs-Reviewed the patient's vital signs.   Patient Vitals for the past 24 hrs:   Temp Pulse Resp BP SpO2   05/29/22 1805 -- -- 19 (!) 145/87 95 %   05/29/22 1715 -- (!) 108 21 (!) 143/88 93 %   05/29/22 1646 -- (!) 108 16 134/79 97 %   05/29/22 1631 -- (!) 105 16 137/77 94 %   05/29/22 1616 -- (!) 102 19 (!) 160/85 95 %   05/29/22 1613 -- 100 16 -- 95 %   05/29/22 1602 -- 100 18 -- 94 %   05/29/22 1601 -- (!) 101 18 (!) 166/88 94 %   05/29/22 1546 -- 99 22 (!) 162/80 94 %   05/29/22 1531 -- 96 17 (!) 155/71 97 %   05/29/22 1521 -- 98 18 -- 96 %   05/29/22 1516 -- 97 15 (!) 168/77 94 %   05/29/22 1501 -- 91 24 (!) 169/79 93 %   05/29/22 1451 -- 87 16 -- 93 %   05/29/22 1446 -- 93 25 124/72 95 %   05/29/22 1438 -- 96 15 -- --   05/29/22 1437 -- (!) 102 17 (!) 136/93 --   05/29/22 1436 -- 94 19 -- 96 %   05/29/22 1435 -- (!) 113 24 -- 95 %   05/29/22 1415 -- (!) 121 25 (!) 152/94 99 %   05/29/22 1405 97.8 °F (36.6 °C) -- -- -- --   05/29/22 1357 -- (!) 137 18 132/80 98 %     Pulse Oximetry Analysis - 95% on RA    Cardiac Monitor:   Rate: 130bpm  Rhythm: Normal Sinus Rhythm      Records Reviewed: Nursing Notes, Old Medical Records, Previous Radiology Studies and Previous Laboratory Studies    Provider Notes (Medical Decision Making):   MDM: Elderly female just discharged for COPD,on antibiotics, resenting for diarrhea and cramping. Abdominal exam benign without focal site of tenderness; patient is pain everywhere and difficult to examine due to her anxiety. Symptomatic medications to be provided and will monitor with IV fluid hydration for tachycardia. ED Course:   Initial assessment performed. The patients presenting problems have been discussed, and they are in agreement with the care plan formulated and outlined with them. I have encouraged them to ask questions as they arise throughout their visit. EKG interpretation: (Preliminary)  Rhythm: Minor tachycardia at 117 bpm; normal NM; normal QRS; normal QTC with left axis deviation. There is no acute ST-T abnormalities on this EKG. This EKG was interpreted by ED Provider Jacques Turcios MD    PROGRESS NOTE:  2:30 p.m. Pt appears more relaxed and heart rate decreasing appropriately with pain and anxiety relief    ED Course as of 05/30/22 0814   Sun May 29, 2022   1543 Evaluated and much improved with a heart rate of 96. White blood count is elevated at 13 9. Patient has been unable to have diarrhea here. All electrolytes are within normal limits. P.o.  challenge to be provided in an attempt to produce diarrhea [JT]      ED Course User Index  [JT] Carlos Ford MD      4:30 PM  Patient remained stable her heart rate is increasing as she is becoming anxious again. She has not had any further pain and she states she feels great after eating. She has not been able to produce any diarrhea. Do not feel CT is warranted at this time. We will hold oral vancomycin as her diarrhea is not foul-smelling, is not green and is no longer present. White cell count is elevated but could be secondary to her steroid use could be secondary to anxiety and given her normal temperature and exam, I do not feel is a sign of acute toxicity at this time. Patient is very anxious to leave and go home. Requested nurse to ambulate around the ER to see how that she is still on her feet and appropriate for discharge. Discharge note:  Pt re-evaluated and noted to be feeling better, ready for discharge. Updated pt on all final lab findings. Will follow up as instructed. All questions have been answered, pt voiced understanding and agreement with plan. Specific return precautions provided as well as instructions to return to the ED should sx worsen at any time. Vital signs stable for discharge. Critical Care Time:   0      Diagnosis     Clinical Impression:   1. Diarrhea, unspecified type    2. Dehydration    3. Tachycardia        PLAN:  1. Discharge Medication List as of 5/29/2022  5:39 PM        2. Follow-up Information     Follow up With Specialties Details Why Contact Info    Yolanda Chow MD Family Medicine Schedule an appointment as soon as possible for a visit  Recheck on Tuesday [address] [address]  [phone]      [address] [place] [address] Emergency Medicine  If symptoms worsen with fevers, pain or worsening [place] [facility] [place]  [phone]        Return to ED if worse     Disposition:  Home       Please note, this dictation was completed with [employer], the [employer] voice recognition software.  Quite often unanticipated grammatical, syntax, homophones, and other interpretive errors are inadvertently transcribed by the computer software. Please disregard these errors. Please excuse any errors that have escaped final proof reading.

## 2022-05-30 ENCOUNTER — HOSPITAL ENCOUNTER (OUTPATIENT)
Dept: LAB | Age: 77
Discharge: HOME | End: 2022-05-30
Payer: MEDICARE

## 2022-05-30 LAB
BACTERIA SPEC CULT: NORMAL
BACTERIA SPEC CULT: NORMAL
C DIFF GDH STL QL: NEGATIVE
C DIFF TOX A+B STL QL IA: NEGATIVE
HEMOCCULT STL QL: POSITIVE
INTERPRETATION: NORMAL
SERVICE CMNT-IMP: NORMAL
SERVICE CMNT-IMP: NORMAL

## 2022-05-30 PROCEDURE — 87449 NOS EACH ORGANISM AG IA: CPT

## 2022-05-30 PROCEDURE — 82272 OCCULT BLD FECES 1-3 TESTS: CPT

## 2022-05-30 PROCEDURE — 87177 OVA AND PARASITES SMEARS: CPT

## 2022-05-30 NOTE — PROGRESS NOTES
Patient brought in stool sample as an outpatient. C. difficile negative but occult blood is positive. Patient to make her aware. Discussed case with her . Patient is still having diarrhea. Given her C. difficile is negative I recommend Imodium but I am still waiting ova and parasites as well as stool culture. Jose Rafael stool occult positive. Unable to send results to her PCP but will send the note through epic.   Alexa Agarwal MD

## 2022-06-02 LAB
ATRIAL RATE: 117 BPM
CALCULATED P AXIS, ECG09: 50 DEGREES
CALCULATED R AXIS, ECG10: -18 DEGREES
CALCULATED T AXIS, ECG11: 48 DEGREES
DIAGNOSIS, 93000: NORMAL
P-R INTERVAL, ECG05: 134 MS
Q-T INTERVAL, ECG07: 320 MS
QRS DURATION, ECG06: 82 MS
QTC CALCULATION (BEZET), ECG08: 446 MS
VENTRICULAR RATE, ECG03: 117 BPM

## 2022-06-04 LAB
BACTERIA SPEC CULT: NORMAL
SERVICE CMNT-IMP: NORMAL

## 2022-06-06 LAB
BACTERIA SPEC CULT: NORMAL
O+P SPEC MICRO: NORMAL
O+P STL CONC: NORMAL
SERVICE CMNT-IMP: NORMAL
SPECIMEN SOURCE: NORMAL

## 2023-09-20 ENCOUNTER — APPOINTMENT (OUTPATIENT)
Facility: HOSPITAL | Age: 78
End: 2023-09-20
Payer: MEDICARE

## 2023-09-20 ENCOUNTER — HOSPITAL ENCOUNTER (OUTPATIENT)
Facility: HOSPITAL | Age: 78
Setting detail: OBSERVATION
Discharge: HOME | End: 2023-09-21
Attending: EMERGENCY MEDICINE | Admitting: INTERNAL MEDICINE
Payer: MEDICARE

## 2023-09-20 DIAGNOSIS — R42 DIZZINESS: Primary | ICD-10-CM

## 2023-09-20 PROBLEM — R29.90 STROKE-LIKE SYMPTOMS: Status: ACTIVE | Noted: 2023-09-20

## 2023-09-20 LAB
ALBUMIN SERPL-MCNC: 3.4 G/DL (ref 3.5–5)
ALBUMIN/GLOB SERPL: 1 (ref 1.1–2.2)
ALP SERPL-CCNC: 93 U/L (ref 45–117)
ALT SERPL-CCNC: 56 U/L (ref 12–78)
ANION GAP SERPL CALC-SCNC: 12 MMOL/L (ref 5–15)
APPEARANCE UR: CLEAR
AST SERPL-CCNC: 30 U/L (ref 15–37)
BACTERIA URNS QL MICRO: ABNORMAL /HPF
BASOPHILS # BLD: 0 K/UL (ref 0–0.1)
BASOPHILS NFR BLD: 0 % (ref 0–1)
BILIRUB SERPL-MCNC: 0.6 MG/DL (ref 0.2–1)
BILIRUB UR QL: NEGATIVE
BUN SERPL-MCNC: 18 MG/DL (ref 6–20)
BUN/CREAT SERPL: 15 (ref 12–20)
CALCIUM SERPL-MCNC: 9.3 MG/DL (ref 8.5–10.1)
CHLORIDE SERPL-SCNC: 101 MMOL/L (ref 97–108)
CHP ED QC CHECK: 128
CO2 SERPL-SCNC: 25 MMOL/L (ref 21–32)
COLOR UR: ABNORMAL
CREAT SERPL-MCNC: 1.2 MG/DL (ref 0.55–1.02)
DIFFERENTIAL METHOD BLD: ABNORMAL
EOSINOPHIL # BLD: 0 K/UL (ref 0–0.4)
EOSINOPHIL NFR BLD: 0 % (ref 0–7)
EPITH CASTS URNS QL MICRO: ABNORMAL /LPF
ERYTHROCYTE [DISTWIDTH] IN BLOOD BY AUTOMATED COUNT: 13.6 % (ref 11.5–14.5)
FLUAV RNA SPEC QL NAA+PROBE: NOT DETECTED
FLUBV RNA SPEC QL NAA+PROBE: NOT DETECTED
GLOBULIN SER CALC-MCNC: 3.5 G/DL (ref 2–4)
GLUCOSE BLD STRIP.AUTO-MCNC: 128 MG/DL (ref 65–117)
GLUCOSE SERPL-MCNC: 139 MG/DL (ref 65–100)
GLUCOSE UR STRIP.AUTO-MCNC: NEGATIVE MG/DL
HCT VFR BLD AUTO: 39 % (ref 35–47)
HGB BLD-MCNC: 13.2 G/DL (ref 11.5–16)
HGB UR QL STRIP: NEGATIVE
IMM GRANULOCYTES # BLD AUTO: 0.1 K/UL (ref 0–0.04)
IMM GRANULOCYTES NFR BLD AUTO: 1 % (ref 0–0.5)
INR PPP: 1 (ref 0.9–1.1)
KETONES UR QL STRIP.AUTO: NEGATIVE MG/DL
LEUKOCYTE ESTERASE UR QL STRIP.AUTO: ABNORMAL
LYMPHOCYTES # BLD: 0.5 K/UL (ref 0.8–3.5)
LYMPHOCYTES NFR BLD: 7 % (ref 12–49)
MCH RBC QN AUTO: 33.7 PG (ref 26–34)
MCHC RBC AUTO-ENTMCNC: 33.8 G/DL (ref 30–36.5)
MCV RBC AUTO: 99.5 FL (ref 80–99)
MONOCYTES # BLD: 0.4 K/UL (ref 0–1)
MONOCYTES NFR BLD: 6 % (ref 5–13)
NEUTS SEG # BLD: 5.5 K/UL (ref 1.8–8)
NEUTS SEG NFR BLD: 86 % (ref 32–75)
NITRITE UR QL STRIP.AUTO: NEGATIVE
NRBC # BLD: 0 K/UL (ref 0–0.01)
NRBC BLD-RTO: 0 PER 100 WBC
PH UR STRIP: 7 (ref 5–8)
PLATELET # BLD AUTO: 202 K/UL (ref 150–400)
PMV BLD AUTO: 8.4 FL (ref 8.9–12.9)
POTASSIUM SERPL-SCNC: 4.2 MMOL/L (ref 3.5–5.1)
PROT SERPL-MCNC: 6.9 G/DL (ref 6.4–8.2)
PROT UR STRIP-MCNC: NEGATIVE MG/DL
PROTHROMBIN TIME: 9.9 SEC (ref 9–11.1)
RBC # BLD AUTO: 3.92 M/UL (ref 3.8–5.2)
RBC #/AREA URNS HPF: ABNORMAL /HPF (ref 0–5)
RBC MORPH BLD: ABNORMAL
SARS-COV-2 RNA RESP QL NAA+PROBE: NOT DETECTED
SERVICE CMNT-IMP: ABNORMAL
SODIUM SERPL-SCNC: 138 MMOL/L (ref 136–145)
SP GR UR REFRACTOMETRY: 1.03 (ref 1–1.03)
URINE CULTURE IF INDICATED: ABNORMAL
UROBILINOGEN UR QL STRIP.AUTO: 0.2 EU/DL (ref 0.2–1)
WBC # BLD AUTO: 6.5 K/UL (ref 3.6–11)
WBC URNS QL MICRO: ABNORMAL /HPF (ref 0–4)

## 2023-09-20 PROCEDURE — 6370000000 HC RX 637 (ALT 250 FOR IP): Performed by: INTERNAL MEDICINE

## 2023-09-20 PROCEDURE — 81001 URINALYSIS AUTO W/SCOPE: CPT

## 2023-09-20 PROCEDURE — 80053 COMPREHEN METABOLIC PANEL: CPT

## 2023-09-20 PROCEDURE — 87636 SARSCOV2 & INF A&B AMP PRB: CPT

## 2023-09-20 PROCEDURE — 85025 COMPLETE CBC W/AUTO DIFF WBC: CPT

## 2023-09-20 PROCEDURE — 70450 CT HEAD/BRAIN W/O DYE: CPT

## 2023-09-20 PROCEDURE — 36415 COLL VENOUS BLD VENIPUNCTURE: CPT

## 2023-09-20 PROCEDURE — 99285 EMERGENCY DEPT VISIT HI MDM: CPT

## 2023-09-20 PROCEDURE — 93880 EXTRACRANIAL BILAT STUDY: CPT

## 2023-09-20 PROCEDURE — G0378 HOSPITAL OBSERVATION PER HR: HCPCS

## 2023-09-20 PROCEDURE — 93005 ELECTROCARDIOGRAM TRACING: CPT | Performed by: EMERGENCY MEDICINE

## 2023-09-20 PROCEDURE — 85610 PROTHROMBIN TIME: CPT

## 2023-09-20 PROCEDURE — 2580000003 HC RX 258: Performed by: INTERNAL MEDICINE

## 2023-09-20 PROCEDURE — 82962 GLUCOSE BLOOD TEST: CPT

## 2023-09-20 PROCEDURE — 87086 URINE CULTURE/COLONY COUNT: CPT

## 2023-09-20 PROCEDURE — 70551 MRI BRAIN STEM W/O DYE: CPT

## 2023-09-20 RX ORDER — SODIUM CHLORIDE 0.9 % (FLUSH) 0.9 %
5-40 SYRINGE (ML) INJECTION EVERY 12 HOURS SCHEDULED
Status: DISCONTINUED | OUTPATIENT
Start: 2023-09-20 | End: 2023-09-21 | Stop reason: HOSPADM

## 2023-09-20 RX ORDER — ONDANSETRON 4 MG/1
4 TABLET, ORALLY DISINTEGRATING ORAL EVERY 8 HOURS PRN
Status: DISCONTINUED | OUTPATIENT
Start: 2023-09-20 | End: 2023-09-21 | Stop reason: HOSPADM

## 2023-09-20 RX ORDER — ENOXAPARIN SODIUM 100 MG/ML
40 INJECTION SUBCUTANEOUS DAILY
Status: DISCONTINUED | OUTPATIENT
Start: 2023-09-20 | End: 2023-09-21 | Stop reason: HOSPADM

## 2023-09-20 RX ORDER — POLYETHYLENE GLYCOL 3350 17 G/17G
17 POWDER, FOR SOLUTION ORAL DAILY PRN
Status: DISCONTINUED | OUTPATIENT
Start: 2023-09-20 | End: 2023-09-21 | Stop reason: HOSPADM

## 2023-09-20 RX ORDER — LOSARTAN POTASSIUM 100 MG/1
100 TABLET ORAL DAILY
Status: DISCONTINUED | OUTPATIENT
Start: 2023-09-20 | End: 2023-09-21 | Stop reason: HOSPADM

## 2023-09-20 RX ORDER — MONTELUKAST SODIUM 10 MG/1
10 TABLET ORAL NIGHTLY
Status: DISCONTINUED | OUTPATIENT
Start: 2023-09-20 | End: 2023-09-21 | Stop reason: HOSPADM

## 2023-09-20 RX ORDER — TOBRAMYCIN AND DEXAMETHASONE 3; 1 MG/ML; MG/ML
2 SUSPENSION/ DROPS OPHTHALMIC NIGHTLY PRN
Status: DISCONTINUED | OUTPATIENT
Start: 2023-09-20 | End: 2023-09-20

## 2023-09-20 RX ORDER — ROSUVASTATIN CALCIUM 20 MG/1
40 TABLET, COATED ORAL NIGHTLY
Status: DISCONTINUED | OUTPATIENT
Start: 2023-09-20 | End: 2023-09-21 | Stop reason: HOSPADM

## 2023-09-20 RX ORDER — GUAIFENESIN 600 MG/1
600 TABLET, EXTENDED RELEASE ORAL 2 TIMES DAILY
Status: DISCONTINUED | OUTPATIENT
Start: 2023-09-20 | End: 2023-09-21 | Stop reason: HOSPADM

## 2023-09-20 RX ORDER — ALBUTEROL SULFATE 90 UG/1
2 AEROSOL, METERED RESPIRATORY (INHALATION) EVERY 4 HOURS PRN
Status: DISCONTINUED | OUTPATIENT
Start: 2023-09-20 | End: 2023-09-20

## 2023-09-20 RX ORDER — ASPIRIN 300 MG/1
300 SUPPOSITORY RECTAL DAILY
Status: DISCONTINUED | OUTPATIENT
Start: 2023-09-21 | End: 2023-09-21 | Stop reason: HOSPADM

## 2023-09-20 RX ORDER — BUDESONIDE 0.5 MG/2ML
0.5 INHALANT ORAL
Status: DISCONTINUED | OUTPATIENT
Start: 2023-09-20 | End: 2023-09-21

## 2023-09-20 RX ORDER — ZOLPIDEM TARTRATE 5 MG/1
5 TABLET ORAL NIGHTLY
Status: DISCONTINUED | OUTPATIENT
Start: 2023-09-20 | End: 2023-09-21 | Stop reason: HOSPADM

## 2023-09-20 RX ORDER — SODIUM CHLORIDE 0.9 % (FLUSH) 0.9 %
5-40 SYRINGE (ML) INJECTION PRN
Status: DISCONTINUED | OUTPATIENT
Start: 2023-09-20 | End: 2023-09-21 | Stop reason: HOSPADM

## 2023-09-20 RX ORDER — ALPRAZOLAM 0.5 MG/1
0.5 TABLET ORAL ONCE
Status: COMPLETED | OUTPATIENT
Start: 2023-09-20 | End: 2023-09-20

## 2023-09-20 RX ORDER — PANTOPRAZOLE SODIUM 40 MG/1
40 TABLET, DELAYED RELEASE ORAL
Status: DISCONTINUED | OUTPATIENT
Start: 2023-09-21 | End: 2023-09-21 | Stop reason: HOSPADM

## 2023-09-20 RX ORDER — ROSUVASTATIN CALCIUM 20 MG/1
40 TABLET, COATED ORAL NIGHTLY
Status: DISCONTINUED | OUTPATIENT
Start: 2023-09-20 | End: 2023-09-20

## 2023-09-20 RX ORDER — CETIRIZINE HYDROCHLORIDE 10 MG/1
10 TABLET ORAL DAILY
Status: DISCONTINUED | OUTPATIENT
Start: 2023-09-20 | End: 2023-09-21 | Stop reason: HOSPADM

## 2023-09-20 RX ORDER — SODIUM CHLORIDE 9 MG/ML
INJECTION, SOLUTION INTRAVENOUS PRN
Status: DISCONTINUED | OUTPATIENT
Start: 2023-09-20 | End: 2023-09-21 | Stop reason: HOSPADM

## 2023-09-20 RX ORDER — ONDANSETRON 2 MG/ML
4 INJECTION INTRAMUSCULAR; INTRAVENOUS EVERY 6 HOURS PRN
Status: DISCONTINUED | OUTPATIENT
Start: 2023-09-20 | End: 2023-09-21 | Stop reason: HOSPADM

## 2023-09-20 RX ORDER — FLUTICASONE PROPIONATE 50 MCG
2 SPRAY, SUSPENSION (ML) NASAL DAILY
Status: DISCONTINUED | OUTPATIENT
Start: 2023-09-20 | End: 2023-09-21 | Stop reason: HOSPADM

## 2023-09-20 RX ORDER — ATORVASTATIN CALCIUM 20 MG/1
20 TABLET, FILM COATED ORAL DAILY
Status: DISCONTINUED | OUTPATIENT
Start: 2023-09-20 | End: 2023-09-20

## 2023-09-20 RX ORDER — ASPIRIN 81 MG/1
81 TABLET, CHEWABLE ORAL DAILY
Status: DISCONTINUED | OUTPATIENT
Start: 2023-09-21 | End: 2023-09-21 | Stop reason: HOSPADM

## 2023-09-20 RX ORDER — IPRATROPIUM BROMIDE AND ALBUTEROL SULFATE 2.5; .5 MG/3ML; MG/3ML
1 SOLUTION RESPIRATORY (INHALATION) EVERY 4 HOURS PRN
Status: DISCONTINUED | OUTPATIENT
Start: 2023-09-20 | End: 2023-09-21 | Stop reason: HOSPADM

## 2023-09-20 RX ADMIN — SODIUM CHLORIDE, PRESERVATIVE FREE 10 ML: 5 INJECTION INTRAVENOUS at 20:36

## 2023-09-20 RX ADMIN — ROSUVASTATIN CALCIUM 40 MG: 20 TABLET, FILM COATED ORAL at 20:32

## 2023-09-20 RX ADMIN — MONTELUKAST 10 MG: 10 TABLET, FILM COATED ORAL at 20:32

## 2023-09-20 RX ADMIN — ALPRAZOLAM 0.5 MG: 0.5 TABLET ORAL at 14:13

## 2023-09-20 RX ADMIN — ATORVASTATIN CALCIUM 20 MG: 20 TABLET, FILM COATED ORAL at 14:05

## 2023-09-20 ASSESSMENT — PAIN SCALES - GENERAL
PAINLEVEL_OUTOF10: 0
PAINLEVEL_OUTOF10: 0

## 2023-09-20 ASSESSMENT — LIFESTYLE VARIABLES
HOW OFTEN DO YOU HAVE A DRINK CONTAINING ALCOHOL: NEVER
HOW MANY STANDARD DRINKS CONTAINING ALCOHOL DO YOU HAVE ON A TYPICAL DAY: PATIENT DOES NOT DRINK

## 2023-09-20 ASSESSMENT — PAIN - FUNCTIONAL ASSESSMENT: PAIN_FUNCTIONAL_ASSESSMENT: 0-10

## 2023-09-20 NOTE — ED TRIAGE NOTES
Pt arrived by POV for hypertension tachycardia. Pt reports she was not feeling well yesterday and had some dizziness, pt reports when she checked her blood pressure this AM it was elevated and along with her HR. Pt called her PMD who advised  her to go to the ER.   Pt is awake alert and oriented X 4, pt educated on ER fl ow

## 2023-09-20 NOTE — ED NOTES
Knocked on door to announce to pt. Hands washed. Introduced self to pt and updated whiteboard. Explained role of self to pt. ED flow explained to pt. Pt verbalized understanding.       Chavo Frank RN  04/94/79 6563

## 2023-09-20 NOTE — H&P
Hospitalist Admission Note    NAME:   Sneha Kc   : 1945   MRN: [id number]     Date/Time: 2023 6:07 PM    Patient PCP: King Gentile MD    ______________________________________________________________________  Given the patient's current clinical presentation, I have a high level of concern for decompensation if discharged from the emergency department. Complex decision making was performed, which includes reviewing the patient's available past medical records, laboratory results, and x-ray films. My assessment of this patient's clinical condition and my plan of care is as follows. Assessment / Plan:    Stroke-like symptoms  -including dizziness. She denied any visual disturbances, facial drooping or aphasia. Given onset of symptoms > 24 hours she was not a candidate for tPA on arrival.  CT scan of the head was negative for acute findings. Will monitor patient with frequent neuro checks, obtain a MRI scan of the brain, bilateral carotid US and consult Neurology for any additional recommendations. Aspirin + statin therapy has been ordered.    -last 2D echo from 2023 reviewed, EF  60-65% with no valvular heart disease    2. Hypertension  -will allow for permissive hypertension at this time    3. Hyperlipidemia  -continue statin therapy, lipid panel in am    4. COPD  -she is not in acute exacerbation at this time and has no oxygen requirement. Will continue home MDI's with nebs as needed    5. GERD  -continue outpatient PPI    Medical Decision Making:   I personally reviewed labs: CMP + CBC  I personally reviewed imaging:CT head WO  I personally reviewed EKG: sinus tachycardia  Discussed case with: ED provider. After discussion I am in agreement that acuity of patient's medical condition necessitates hospital stay.       Code Status: FULL  DVT Prophylaxis: Lovenox  GI Prophylaxis: Protonix      Subjective:   CHIEF COMPLAINT: \"I felt dizzy\"    HISTORY OF PRESENT Area 1.61 m2    Right cca dist PSV 39.4 cm/s    Right CCA dist EDV 9.0 cm/s    Right CCA prox PSV 87.0 cm/s    Right CCA prox EDV 11.3 cm/s    Right vertebral PSV 29.6 cm/s    Right vertebral EDV 8.97 cm/s    Right ECA PSV 38.4 cm/s    Right ECA EDV 2.35 cm/s    Right ICA dist PSV 20.6 cm/s    Right ICA dist EDV 5.5 cm/s    Right ICA mid PSV 20.1 cm/s    Right ICA mid EDV 5.5 cm/s    Right ICA prox PSV 17.4 cm/s    Right ICA prox EDV 4.8 cm/s    Right ICA/CCA PSV 0.5 no units    Left CCA dist PSV 38.4 cm/s    Left CCA dist EDV 7.1 cm/s    Left CCA prox PSV 43.8 cm/s    Left CCA prox EDV 10.9 cm/s    Left vertebral PSV 22.7 cm/s    Left vertebral EDV 6.46 cm/s    Left ECA PSV 40.2 cm/s    Left ECA EDV 3.71 cm/s    Left ICA dist PSV 54.6 cm/s    Left ICA dist EDV 15.4 cm/s    Left ICA mid PSV 28.4 cm/s    Left ICA mid EDV 7.3 cm/s    Left ICA prox PSV 24.1 cm/s    Left ICA prox EDV 8.3 cm/s    Left ICA/CCA PSV 1.42 no units         MRI brain without contrast    Result Date: 9/20/2023  EXAM: MRI BRAIN WO CONTRAST INDICATION: eval for CVA COMPARISON: MRI brain 7/19/2011, CT head 9/20/2023. CONTRAST: None. TECHNIQUE:  Multiplanar multisequence acquisition without contrast of the brain. FINDINGS: Generalized parenchymal volume loss with commensurate dilation of the sulci and ventricular system, which has progressed since prior MRI. Scattered periventricular and deep white matter T2/FLAIR hyperintensities, consistent with mild chronic microvascular ischemic disease, and also progressed since prior MRI. There is no acute infarct, hemorrhage, extra-axial fluid collection, or mass effect. There is no cerebellar tonsillar herniation. Expected arterial flow-voids are present. The paranasal sinuses, mastoid air cells, and middle ears are clear. The orbital contents are within normal limits with bilateral lens implants. No significant osseous or scalp lesions are identified. 1. No acute intracranial abnormality.  2.

## 2023-09-20 NOTE — ED NOTES
Pt ambulatory to restroom with this RN. Gait normal however pt reports feeling off balance, having to catch herself. Pt educated on and used call bell in rest room to return to room. Pt states that walking like this is not her baseline.      Hayden Hi, RAMSES  96/71/52 1307

## 2023-09-20 NOTE — PROGRESS NOTES
Physical Therapy:    Orders received and chart reviewed. Pt is still currently in the ED and no H&P available. Will defer evaluation until after diagnostic testing is completed and pt is settled into her room on Med[facility] unit. Will continue to follow later this PM as appropriate and as time allows or tomorrow AM. Thank you.      JADEN Fields DPT

## 2023-09-20 NOTE — PROGRESS NOTES
Initial spiritual assessment in Rm 136  Provided empathic listening and spiritual support  Advised of  Availability    [id number] [facility]

## 2023-09-20 NOTE — PROGRESS NOTES
MRI results verbalized to Dr. Cj Sparks.  Received verbal orders to discontinue stroke work up but patient will need to be seen by a neurologist.

## 2023-09-20 NOTE — ED NOTES
Pt transported to CT scan with [employer] by [employer] at 1050.       Shira Christensen RN  82/07/79 7210

## 2023-09-20 NOTE — ED NOTES
Admission SBAR Note  Situation/Background: pt comes into ED with concerns over BP and HR, pt later reports feeling off. This started yesterday morning. Pt noted to be ataxic with left arm and leg/gait abnormality when walking later. Pt also reports decreased sensation in left arm and leg when assessed with light touch. Pt to have neuro work up after admitted. Current NIHSS 3 for ataxia in 2 limbs and decreased sensation (mild). Pt has a hx of COPD. Pt also has a hx of HTN and Tachycardia    Patient is being transferred to [address] [place] [address], Room# Bed 136    Patient's Chief Complaint was High BP and HR and is admitted for Dizziness. CODE STATUS: Full  CSSRS: 0 - No Risk    ISOLATION/PRECAUTIONS: No  ISOLATION TYPE: n/a    Is this a behavioral health patient? No  Has wanding been completed No  Are belongings secure? No    Called outstanding consults: Yes    STAT labs collected: Yes    Repeat Lactic Acid DUE? No  TIME DUE: n/a    All STAT orders are complete: Yes    The following personal items will be sent with the patient during transfer to the floor: All valuables: glasses,  with patient at bedside  and clothing with pt      ASSESSMENT:    NEURO:   NIH SCORE: 0,1-4,5-15,15-20,21-42: 3   HERMAN SWALLOW SCREEN COMPLETE: Yes  ORIENTATION LEVEL: ORIENTATION LEVEL: Person, Place, Time, and Situation  Cognition:  appropriate decision making, appropriate for age attention/concentration, appropriate safety awareness, and following commands  follows multi-step complex commands/direction  Speech: shows no evidence of impairment    Is patient impulsive? No  Is patient oriented? Yes  Do they follow commands? Yes  Is the patient ambulatory? Yes    FALL RISK? Yes  Interventions: Implemented/recommended use of non-skid footwear    RESPIRATORY:   Is patient on oxygen? No  Oxygen therapy: room air  O2 rate: n/a    CARDIAC:   Is cardiac monitoring ordered? Yes    Last Rhythm: Rhythm including paccardio: Normal Sinus Rhythm 99 and Sinus Tachycardia 105  Patient to transfer with tele box on? Yes  Infusions: Meds; iv fluids: none  LINE ACCESS: 20G Peripheral IV , Antecubital and Left , Iv rate: heplock       /GI:   Continent Bowel/Bladder? Yes  Urinary Output: n/a  Was UA with reflex sent to lab? Yes  If no, collect and send prior to transport to inpatient area. INTEGUMENTARY:  IS THE PATIENT UNDRESSED? Yes  ARE THERE WOUNDS PRESENT? No  ARE THE WOUNDS DOCUMENTED? No    RESTRAINTS IN USE: No    IS THE DOCUMENTATION COMPLETE? Yes  Is there a current order? Yes  When does it ?  Inpatient orders       Vital Signs:   / Level of Consciousness: Alert (0)    Vitals:    23 1130 23 1144 23 1200 23 1230   BP: (!) 152/91  (!) 161/97 (!) 156/93   Pulse: 99 97 (!) 101 (!) 102   Resp: 12 23 12 20   Temp:       TempSrc:       SpO2: 96% 96% 99% 98%   Weight:       Height:              Pain 1: 0  Pain Scale 1: 1-10    REVIEW: Pt here for workup for dizziness/ataxia, came in for high BP/HR which are actually baseline    IP UNIT CALLED NOTE IS READY: Yes  IF THERE ARE QUESTIONS, Radha Ryder [phone] AT PHONE # Chela Casas RN  72/75/05 3713

## 2023-09-21 VITALS
OXYGEN SATURATION: 99 % | BODY MASS INDEX: 23.92 KG/M2 | RESPIRATION RATE: 18 BRPM | TEMPERATURE: 97.7 F | DIASTOLIC BLOOD PRESSURE: 85 MMHG | HEART RATE: 76 BPM | WEIGHT: 130 LBS | HEIGHT: 62 IN | SYSTOLIC BLOOD PRESSURE: 150 MMHG

## 2023-09-21 PROBLEM — I10 PRIMARY HYPERTENSION: Status: ACTIVE | Noted: 2023-09-21

## 2023-09-21 PROBLEM — I67.9 CEREBRAL VASCULAR DISEASE: Status: ACTIVE | Noted: 2023-09-21

## 2023-09-21 PROBLEM — J44.9 COPD (CHRONIC OBSTRUCTIVE PULMONARY DISEASE) (HCC): Status: ACTIVE | Noted: 2020-05-29

## 2023-09-21 PROBLEM — R42 DIZZINESS: Status: ACTIVE | Noted: 2023-09-21

## 2023-09-21 PROBLEM — E78.5 HYPERLIPIDEMIA: Chronic | Status: ACTIVE | Noted: 2023-09-21

## 2023-09-21 PROBLEM — G31.84 MCI (MILD COGNITIVE IMPAIRMENT): Status: ACTIVE | Noted: 2023-09-21

## 2023-09-21 LAB
ANION GAP SERPL CALC-SCNC: 9 MMOL/L (ref 5–15)
BACTERIA SPEC CULT: NORMAL
BUN SERPL-MCNC: 14 MG/DL (ref 6–20)
BUN/CREAT SERPL: 13 (ref 12–20)
CALCIUM SERPL-MCNC: 8.9 MG/DL (ref 8.5–10.1)
CC UR VC: NORMAL
CHLORIDE SERPL-SCNC: 105 MMOL/L (ref 97–108)
CHOLEST SERPL-MCNC: 221 MG/DL
CO2 SERPL-SCNC: 27 MMOL/L (ref 21–32)
CREAT SERPL-MCNC: 1.09 MG/DL (ref 0.55–1.02)
ERYTHROCYTE [DISTWIDTH] IN BLOOD BY AUTOMATED COUNT: 13.8 % (ref 11.5–14.5)
EST. AVERAGE GLUCOSE BLD GHB EST-MCNC: 111 MG/DL
GLUCOSE SERPL-MCNC: 95 MG/DL (ref 65–100)
HBA1C MFR BLD: 5.5 % (ref 4–5.6)
HCT VFR BLD AUTO: 37.6 % (ref 35–47)
HDLC SERPL-MCNC: 139 MG/DL
HDLC SERPL: 1.6 (ref 0–5)
HGB BLD-MCNC: 12.6 G/DL (ref 11.5–16)
LDLC SERPL CALC-MCNC: 63 MG/DL (ref 0–100)
MCH RBC QN AUTO: 34.1 PG (ref 26–34)
MCHC RBC AUTO-ENTMCNC: 33.5 G/DL (ref 30–36.5)
MCV RBC AUTO: 101.6 FL (ref 80–99)
NRBC # BLD: 0 K/UL (ref 0–0.01)
NRBC BLD-RTO: 0 PER 100 WBC
PLATELET # BLD AUTO: 192 K/UL (ref 150–400)
PMV BLD AUTO: 9.1 FL (ref 8.9–12.9)
POTASSIUM SERPL-SCNC: 3.8 MMOL/L (ref 3.5–5.1)
RBC # BLD AUTO: 3.7 M/UL (ref 3.8–5.2)
SERVICE CMNT-IMP: NORMAL
SODIUM SERPL-SCNC: 141 MMOL/L (ref 136–145)
TRIGL SERPL-MCNC: 95 MG/DL
TSH SERPL DL<=0.05 MIU/L-ACNC: 2.35 UIU/ML (ref 0.36–3.74)
VLDLC SERPL CALC-MCNC: 19 MG/DL
WBC # BLD AUTO: 7 K/UL (ref 3.6–11)

## 2023-09-21 PROCEDURE — G0378 HOSPITAL OBSERVATION PER HR: HCPCS

## 2023-09-21 PROCEDURE — 6370000000 HC RX 637 (ALT 250 FOR IP): Performed by: INTERNAL MEDICINE

## 2023-09-21 PROCEDURE — 2580000003 HC RX 258: Performed by: INTERNAL MEDICINE

## 2023-09-21 PROCEDURE — 83036 HEMOGLOBIN GLYCOSYLATED A1C: CPT

## 2023-09-21 PROCEDURE — 85027 COMPLETE CBC AUTOMATED: CPT

## 2023-09-21 PROCEDURE — 97161 PT EVAL LOW COMPLEX 20 MIN: CPT

## 2023-09-21 PROCEDURE — 97165 OT EVAL LOW COMPLEX 30 MIN: CPT

## 2023-09-21 PROCEDURE — 97110 THERAPEUTIC EXERCISES: CPT

## 2023-09-21 PROCEDURE — 80061 LIPID PANEL: CPT

## 2023-09-21 PROCEDURE — 84443 ASSAY THYROID STIM HORMONE: CPT

## 2023-09-21 PROCEDURE — 36415 COLL VENOUS BLD VENIPUNCTURE: CPT

## 2023-09-21 PROCEDURE — 99222 1ST HOSP IP/OBS MODERATE 55: CPT | Performed by: PSYCHIATRY & NEUROLOGY

## 2023-09-21 PROCEDURE — 80048 BASIC METABOLIC PNL TOTAL CA: CPT

## 2023-09-21 RX ORDER — FLUTICASONE FUROATE, UMECLIDINIUM BROMIDE AND VILANTEROL TRIFENATATE 100; 62.5; 25 UG/1; UG/1; UG/1
1 POWDER RESPIRATORY (INHALATION) DAILY
Qty: 1 EACH | Refills: 0 | Status: SHIPPED
Start: 2023-09-22

## 2023-09-21 RX ORDER — ASPIRIN 81 MG/1
81 TABLET, CHEWABLE ORAL DAILY
Qty: 30 TABLET | Refills: 3 | Status: SHIPPED | COMMUNITY
Start: 2023-09-22

## 2023-09-21 RX ORDER — METOPROLOL TARTRATE 50 MG/1
50 TABLET, FILM COATED ORAL 2 TIMES DAILY
Status: DISCONTINUED | OUTPATIENT
Start: 2023-09-21 | End: 2023-09-21 | Stop reason: HOSPADM

## 2023-09-21 RX ORDER — METOPROLOL TARTRATE 50 MG/1
50 TABLET, FILM COATED ORAL 2 TIMES DAILY
Qty: 60 TABLET | Refills: 0 | Status: SHIPPED | OUTPATIENT
Start: 2023-09-21

## 2023-09-21 RX ADMIN — PANTOPRAZOLE SODIUM 40 MG: 40 TABLET, DELAYED RELEASE ORAL at 06:55

## 2023-09-21 RX ADMIN — LOSARTAN POTASSIUM 100 MG: 100 TABLET, FILM COATED ORAL at 08:50

## 2023-09-21 RX ADMIN — METOPROLOL TARTRATE 50 MG: 50 TABLET ORAL at 12:50

## 2023-09-21 RX ADMIN — SODIUM CHLORIDE, PRESERVATIVE FREE 10 ML: 5 INJECTION INTRAVENOUS at 08:50

## 2023-09-21 RX ADMIN — ASPIRIN 81 MG: 81 TABLET, CHEWABLE ORAL at 12:50

## 2023-09-21 RX ADMIN — GUAIFENESIN 600 MG: 600 TABLET ORAL at 08:50

## 2023-09-21 RX ADMIN — CETIRIZINE HYDROCHLORIDE 10 MG: 10 TABLET, FILM COATED ORAL at 08:49

## 2023-09-21 RX ADMIN — FLUTICASONE PROPIONATE 2 SPRAY: 50 SPRAY, METERED NASAL at 08:50

## 2023-09-21 NOTE — PROGRESS NOTES
Teleneuro consult with Dr. Jennifer Sal today, 9/21 at 4:00 p.m. Dr. Haydee Christensen has been notified.

## 2023-09-21 NOTE — PLAN OF CARE
Problem: Pain  Goal: Verbalizes/displays adequate comfort level or baseline comfort level  9/21/2023 1116 by Celine Martin RN  Outcome: Progressing  Flowsheets (Taken 9/21/2023 0800)  Verbalizes/displays adequate comfort level or baseline comfort level: Encourage patient to monitor pain and request assistance  9/21/2023 0001 by Marni Norris LPN  Outcome: Progressing     Problem: ABCDS Injury Assessment  Goal: Absence of physical injury  9/21/2023 1116 by Celine Martin RN  Outcome: Progressing  9/21/2023 0001 by Marni Norris LPN  Outcome: Progressing     Problem: Safety - Adult  Goal: Free from fall injury  9/21/2023 1116 by Celine Martin RN  Outcome: Progressing  9/21/2023 0001 by Marni Norris LPN  Outcome: Progressing

## 2023-09-21 NOTE — DISCHARGE SUMMARY
TobraDex 0.3-0.1 % ophthalmic ointment  Generic drug: tobramycin-dexamethasone     umeclidinium-vilanterol 62.5-25 MCG/ACT inhaler  Commonly known as: ANORO ELLIPTA               Where to Get Your Medications        [facility], [address] [place] [address] Teresa Summers, [address] [phone] - [address] [facility], [address] [facility] [address]      Phone: [phone]   metoprolol tartrate 50 MG tablet       Information about where to get these medications is not yet available    Ask your nurse or doctor about these medications  aspirin 81 MG chewable tablet  Trelegy Ellipta 100-62.5-25 MCG/ACT Aepb inhaler         My Recommended  Diet: Cardiac  Activity: Ad Debo  Wound Care: none  Follow-up labs: routine    HOSPITALIST RECOMMENDATIONS  Maintain current Medications  Add Aspirin 81mg daily  Add Metoprolol 50mg twice daily  Verify accuracy of home BP monitor - replace if reading too high  [facility], [address] [place] [address]    ______________________________________________________________________  DISPOSITION:    Home with Family: x   Home with HH/PT/OT/RN:    SNF/LTC:    MYRIAM:    OTHER:        Condition at Discharge:  Stable  _____________________________________________________________________  Follow up with:   PCP : Reymundo Rowe MD  Follow-up Information       Follow up With Specialties Details Why Contact Info    Reymundo Rowe MD Family Medicine Follow up in 5 day(s) Pt has appt for Tue 9/26 with [facility] [address] [place] [address] Av UP results from Carotid Duplex  - not reported at time of discharge  Addendum 9/23:   Mild (<50%) stenosis in the left internal carotid artery    Total time in minutes spent coordinating this discharge (includes going over instructions, follow-up, prescriptions, and preparing report for sign off to her PCP) :35 minutes    Signed:  Conrad Thayer MD  [facility]

## 2023-09-21 NOTE — ACP (ADVANCE CARE PLANNING)
Advance Care Planning     General Advance Care Planning (ACP) Conversation    Date of Conversation: 9/20/2023  Conducted with: Patient with Decision Making Capacity    Healthcare Decision Maker:    Primary Decision Maker: Gertrude Goltz Spouse - [phone]    Secondary Decision Maker: Fernando Hernandez - Child - [phone]  Click here to complete Healthcare Decision Makers including selection of the Healthcare Decision Maker Relationship (ie \"Primary\"). Today we documented Decision Maker(s) consistent with Legal Next of Kin hierarchy. Content/Action Overview:   Has ACP document(s) NOT on file - requested patient to provide  Reviewed DNR/DNI and patient elects Full Code (Attempt Resuscitation)  treatment goals  N/a    Length of Voluntary ACP Conversation in minutes:  <16 minutes (Non-Billable)    Casi Martini

## 2023-09-21 NOTE — DISCHARGE INSTRUCTIONS
HOSPITALIST RECOMMENDATIONS    Maintain current Medications  Add Aspirin 81mg daily  Add Metoprolol 50mg twice daily  Verify accuracy of home BP monitor - replace if reading too high  CARYN BARTON MD   [phone]                 9 Ways to Cut Back on Drinking  Maybe you've found yourself drinking more alcohol than you'd prefer. If you want to cut back, here are some ideas to try. Think before you drink. Do you really want a drink, or is it just a habit? If you're used to having a drink at a certain time, try doing something else then. Look for substitutes. Find some no-alcohol drinks that you enjoy, like flavored seltzer water, tea with honey, or tonic with a slice of lime. Or try alcohol-free beer or \"virgin\" cocktails (without the alcohol). Drink more water. Use water to quench your thirst. Drink a glass of water before you have any alcohol. Have another glass along with every drink or between drinks. Shrink your drink. For example, have a bottle of beer instead of a pint. Use a smaller glass for wine. Choose drinks with lower alcohol content (ABV%). Or use less liquor and more mixer in cocktails. Slow down. It's easy to drink quickly and without thinking about it. Pay attention, and make each drink last longer. Do the math. Total up how much you spend on alcohol each month. How much is that a year? If you cut back, what could you do with the money you save? Take a break. Choose a day or two each week when you won't drink at all. Notice how you feel on those days, physically and emotionally. How did you sleep? Do you feel better? Over time, add more break days. Count calories. Would you like to lose some weight? That can be a good motivator for cutting back. Figure out how many calories are in each drink. How many does that add up to in a day? In a week? In a month? Practice saying no. Be ready when someone offers you a drink. Try: Mamie Kaur, I've had enough. \" Or \"Thanks, but

## 2023-09-21 NOTE — CARE COORDINATION
Care Management Initial Assessment       RUR: n/a in obs   Readmission? No  1st IM letter given? No  1st  letter given: No     09/21/23 1206   Service Assessment   Patient Orientation Alert and Oriented   Cognition Alert   History Provided By Patient   Primary Caregiver Self   Accompanied By/Relationship Micky Chew Spouse/Significant Other   Patient's Healthcare Decision Maker is: Legal Next of Kin  (States has ACP document NONE on file. Asked to bring in.)   PCP Verified by CM Yes  Pacheco Adler MD)   Last Visit to PCP Within last 3 months   Prior Functional Level Independent in ADLs/IADLs   Current Functional Level Independent in ADLs/IADLs   Can patient return to prior living arrangement Yes   Ability to make needs known: Good   Family able to assist with home care needs: Yes   Would you like for me to discuss the discharge plan with any other family members/significant others, and if so, who? Yes  (Spouse)   Financial Resources Medicare   Social/Functional History   Lives With Spouse   Type of [address] None   Active  Yes   Discharge Planning   Type of Residence House   Patient expects to be discharged to: [facility]       Ms. Carolynn Carlton lives at home with her . She is normally independent with her ADLS. States she still is and does NOT need anything post discharge. Told her she may be discharged today per MD in IDR rounds. She is currently in observation status. Spouse states he was upset about patient being observation and not inpatient because he is concerned the secondary insurance will NOT cover the 20%. Told patient and spouse CM does NOT have authority to upgrade patient it depends on medical necessity as determined by MD and VUR. Told them will send e-mail to VUR nurse about upgrading patient. Patient may be discharged today however. Patient did agree to sign the Beck letter. Medicare Outpatient Observation Notice provided to [facility].  Oral

## 2023-09-21 NOTE — PROGRESS NOTES
0729: report given to this nurse from [address] [place] [address] [id number]: MD Kyle Reyes at the bedside via robot, MD assess pt with NIHSS and balance test, all pt questions answered by MD, MD makes pt aware communication with hospitalist MD Angelina Strong to discuss d/c pending   Halle Gonsalez RN    5463: d/c order noted for pt per MD Akhil Suero RN    3475: d/c instructions printed for review  Halle Gonsalez RN    [id number]: d/c instructions reviewed with pt, all questions answered, no c/o health concern at time of d/c, pt escorted to d/c area for   ARafaela Finn RN

## 2023-09-21 NOTE — PROGRESS NOTES
Follow up visit with patient in Rm 136  Provided empathic listening and spiritual support  Advised of  Availability   [address] [facility]

## 2023-09-21 NOTE — PLAN OF CARE
Problem: Pain  Goal: Verbalizes/displays adequate comfort level or baseline comfort level  9/21/2023 0001 by Danae Garrison LPN  Outcome: Progressing  9/20/2023 1434 by Vane Low RN  Outcome: Progressing     Problem: ABCDS Injury Assessment  Goal: Absence of physical injury  9/21/2023 0001 by Danae Garrison LPN  Outcome: Progressing  9/20/2023 1434 by Vane Low RN  Outcome: Progressing     Problem: Safety - Adult  Goal: Free from fall injury  Outcome: Progressing

## 2023-09-22 LAB
ECHO BSA: 1.61 M2
VAS LEFT CCA DIST EDV: 7.1 CM/S
VAS LEFT CCA DIST PSV: 38.4 CM/S
VAS LEFT CCA PROX EDV: 10.9 CM/S
VAS LEFT CCA PROX PSV: 43.8 CM/S
VAS LEFT ECA EDV: 3.71 CM/S
VAS LEFT ECA PSV: 40.2 CM/S
VAS LEFT ICA DIST EDV: 15.4 CM/S
VAS LEFT ICA DIST PSV: 54.6 CM/S
VAS LEFT ICA MID EDV: 7.3 CM/S
VAS LEFT ICA MID PSV: 28.4 CM/S
VAS LEFT ICA PROX EDV: 8.3 CM/S
VAS LEFT ICA PROX PSV: 24.1 CM/S
VAS LEFT ICA/CCA PSV: 1.42 NO UNITS
VAS LEFT VERTEBRAL EDV: 6.46 CM/S
VAS LEFT VERTEBRAL PSV: 22.7 CM/S
VAS RIGHT CCA DIST EDV: 9 CM/S
VAS RIGHT CCA DIST PSV: 39.4 CM/S
VAS RIGHT CCA PROX EDV: 11.3 CM/S
VAS RIGHT CCA PROX PSV: 87 CM/S
VAS RIGHT ECA EDV: 2.35 CM/S
VAS RIGHT ECA PSV: 38.4 CM/S
VAS RIGHT ICA DIST EDV: 5.5 CM/S
VAS RIGHT ICA DIST PSV: 20.6 CM/S
VAS RIGHT ICA MID EDV: 5.5 CM/S
VAS RIGHT ICA MID PSV: 20.1 CM/S
VAS RIGHT ICA PROX EDV: 4.8 CM/S
VAS RIGHT ICA PROX PSV: 17.4 CM/S
VAS RIGHT ICA/CCA PSV: 0.5 NO UNITS
VAS RIGHT VERTEBRAL EDV: 8.97 CM/S
VAS RIGHT VERTEBRAL PSV: 29.6 CM/S

## 2023-09-22 NOTE — CARE COORDINATION
09/22/23 [facility] Discharge   Transition of Care Consult (CM Consult) N/A   Services At/After Discharge None   [employer] Information Provided? No   Mode of Transport at Discharge Other (see comment)  (POV family)   Confirm Follow Up Transport Self       Ms. Nahed Hartley was discharged yesterday 9/21. She did NOT want home health or any other services. Patient is aware and agrees with discharge plan. Patient aware to contact CM for any questions or concerns post discharge. Transition of Care Plan:    RUR: n/a in obs   Prior Level of Functioning: Independent   Disposition: Home   If SNF or IPR: Date FOC offered:   Date FOC received:   Accepting facility:   Date authorization started with reference number:   Date authorization received and expires: Follow up appointments: Winston Nunez MD 9/26 --already established   DME needed: None   Transportation at discharge: POV   IM/IMM Medicare/ letter given: n/a was in obs   Is patient a [place] and connected with [facility]? If yes, was [place] transfer form completed and [facility] notified? Caregiver Contact:   Discharge Caregiver contacted prior to discharge? Care Conference needed?    Barriers to discharge: None

## 2023-09-23 LAB
EKG ATRIAL RATE: 124 BPM
EKG DIAGNOSIS: NORMAL
EKG P AXIS: 22 DEGREES
EKG P-R INTERVAL: 136 MS
EKG Q-T INTERVAL: 312 MS
EKG QRS DURATION: 74 MS
EKG QTC CALCULATION (BAZETT): 448 MS
EKG R AXIS: -25 DEGREES
EKG T AXIS: 7 DEGREES
EKG VENTRICULAR RATE: 124 BPM

## 2023-11-30 ENCOUNTER — TELEPHONE (OUTPATIENT)
Age: 78
End: 2023-11-30

## 2023-11-30 NOTE — TELEPHONE ENCOUNTER
Received message from Dr. Betito Augustin on 9/20/23 patient needing a hospital follow up.   Called patient on 10/17/23 and 10/24/23 and 11/30/23    Left multiple voicemails for patient to return my call     Please schedule patient accordingly upon return call

## 2024-07-15 NOTE — ED NOTES
LACTATION CONSULT NOTE      Tuyet Garcia is a 26 year old  female at 37w5d    [id number]  : 1998   26 year old     37w5d    Problem List:  Patient Active Problem List   Diagnosis    Obesity in pregnancy (CMD)    Childhood asthma without complication (CMD)    Previous  delivery affecting pregnancy (CMD)    Pregnancy (CMD)    Bacterial vaginosis    History of pre-eclampsia in prior pregnancy, currently pregnant (CMD)    Marijuana use    Anemia complicating pregnancy (CMD)    Maternal varicella, non-immune (CMD)    HSIL (high grade squamous intraepithelial lesion) on Pap smear of cervix    LGA (large for gestational age) fetus affecting mother, antepartum (CMD)    Delivery of pregnancy by  section (CMD)    Delivery by  section using low vertical uterine incision (CMD)         History:   Primary Medical:    Past Medical History:   Diagnosis Date    Anemia     Asthma (CMD)     Chlamydia infection 2017    Gestational hypertension (CMD) 2020    Gonorrhea 2017    Morbid obesity  (CMD)     UTI (urinary tract infection) 2024     Primary Surgical:    Past Surgical History:   Procedure Laterality Date    Appendectomy       section, low transverse         SOCIAL HISTORY:   Social:    Social History     Tobacco Use    Smoking status: Never    Smokeless tobacco: Never   Substance Use Topics    Alcohol use: Not Currently     Comment: Social prior to pregnancy     Sexual:    Sexually Active: Not Currently   Partners with: Male       Birth Control/Protection: None    Drug:    Drug Use:    Not Current*       Comment: prior to pregnancy    Employment:  Review of patient's social economics indicates:   SAHM              unemployed                 Current Facility-Administered Medications   Medication    oxyTOCIN (PITOCIN) 30 Units in sodium chloride 0.9% 500 mL    sodium chloride 0.9 % flush bag 25 mL    sodium chloride 0.9 % injection 2 mL    lactated ringers infusion     RT called lactated ringers bolus 1,000 mL    ceFAZolin (ANCEF) syringe 3,000 mg    SODIUM CHLORIDE 0.9 % IV SOLN Pyxis Override    naLOXone (NARCAN) injection 0.1 mg    lipid rescue kit with fat emulsion (IntraLIPID, NUTRILIPID) 20 % injection 82.1 mL    lipid rescue kit with fat emulsion (IntraLIPID, NUTRILIPID) 20 % injection 205.1 mL    lipid rescue kit with fat emulsion (IntraLIPID, NUTRILIPID) 20 % injection 410.3 mL    lipid rescue kit with fat emulsion (IntraLIPID, NUTRILIPID) 20 % injection 205.1 mL    lipid rescue kit with fat emulsion (IntraLIPID, NUTRILIPID) 20 % injection 410.3 mL    sodium chloride 0.9 % flush bag 25 mL    sodium chloride 0.9 % injection 2 mL    diphenhydrAMINE (BENADRYL) injection 12.5 mg    ondansetron (ZOFRAN ODT) disintegrating tablet 4 mg    Or    ondansetron (ZOFRAN) injection 4 mg    fentaNYL (SUBLIMAZE) injection 25 mcg    ketorolac (TORADOL) injection 30 mg    Followed by    [START ON 7/16/2024] ibuprofen (MOTRIN) tablet 600 mg    acetaminophen (TYLENOL) tablet 650 mg    [START ON 7/16/2024] oxyCODONE (IMM REL) (ROXICODONE) tablet 5 mg    measles-mumps-rubella vaccine 0.5 mL    varicella virus (VARIVAX) live vaccine 0.5 mL    diphtheria-pertussis (acellular)-tetanus (BOOSTRIX) vaccine 0.5 mL    oxyTOCIN (PITOCIN) 30 Units in sodium chloride 0.9% 500 mL    nalbuphine (NUBAIN) injection 5 mg    hydroCORTisone-pramoxine (ANALPRAM-HC) 2.5-1 % rectal cream 1 application.    simethicone (MYLICON) tablet 125 mg    calcium carbonate (TUMS) chewable tablet 500 mg    prochlorperazine (COMPAZINE) injection 5 mg    docusate sodium-sennosides (SENOKOT S) 50-8.6 MG 2 tablet    bisacodyl (DULCOLAX) suppository 10 mg    magnesium hydroxide (MILK OF MAGNESIA) 400 MG/5ML suspension 30 mL    [START ON 7/16/2024] polyethylene glycol (MIRALAX) packet 17 g    sodium chloride 0.9 % injection 2 mL    lactated ringers infusion    [START ON 7/16/2024] enoxaparin (LOVENOX) injection 40 mg    ferrous sulfate (65 mg  Fe per 325 mg) tablet 325 mg    [START ON 2024] furosemide (LASIX) tablet 20 mg    metroNIDAZOLE (FLAGYL) tablet 500 mg       Route of delivery: , Low Transverse [251] . Mother would like to both formula and breast feed. Has about two months experience breastfeeding with previous child. Instructed to breast feed prior to bottle feeding and to feed on demand 8-12 times per 24 hours. Mother was very sleepy encouraged to call for latch assist as needed, name and number left on white board..

## 2024-11-04 ENCOUNTER — HOSPITAL ENCOUNTER (EMERGENCY)
Facility: HOSPITAL | Age: 79
Discharge: HOME | End: 2024-11-04
Payer: MEDICARE

## 2024-11-04 ENCOUNTER — APPOINTMENT (OUTPATIENT)
Facility: HOSPITAL | Age: 79
End: 2024-11-04
Payer: MEDICARE

## 2024-11-04 VITALS
BODY MASS INDEX: 23 KG/M2 | DIASTOLIC BLOOD PRESSURE: 68 MMHG | RESPIRATION RATE: 25 BRPM | WEIGHT: 125 LBS | TEMPERATURE: 98.3 F | HEART RATE: 96 BPM | OXYGEN SATURATION: 99 % | SYSTOLIC BLOOD PRESSURE: 139 MMHG | HEIGHT: 62 IN

## 2024-11-04 DIAGNOSIS — J44.1 COPD EXACERBATION (HCC): Primary | ICD-10-CM

## 2024-11-04 LAB
ALBUMIN SERPL-MCNC: 3.4 G/DL (ref 3.5–5)
ALBUMIN/GLOB SERPL: 1.1 (ref 1.1–2.2)
ALP SERPL-CCNC: 101 U/L (ref 45–117)
ALT SERPL-CCNC: 30 U/L (ref 12–78)
ANION GAP SERPL CALC-SCNC: 15 MMOL/L (ref 2–12)
AST SERPL-CCNC: 18 U/L (ref 15–37)
BASOPHILS # BLD: 0 K/UL (ref 0–0.1)
BASOPHILS NFR BLD: 0 % (ref 0–1)
BILIRUB SERPL-MCNC: 1 MG/DL (ref 0.2–1)
BUN SERPL-MCNC: 18 MG/DL (ref 6–20)
BUN/CREAT SERPL: 14 (ref 12–20)
CALCIUM SERPL-MCNC: 9.5 MG/DL (ref 8.5–10.1)
CHLORIDE SERPL-SCNC: 101 MMOL/L (ref 97–108)
CO2 SERPL-SCNC: 23 MMOL/L (ref 21–32)
CREAT SERPL-MCNC: 1.28 MG/DL (ref 0.55–1.02)
D DIMER PPP FEU-MCNC: 2.35 MG/L FEU (ref 0–0.65)
DIFFERENTIAL METHOD BLD: ABNORMAL
EOSINOPHIL # BLD: 0 K/UL (ref 0–0.4)
EOSINOPHIL NFR BLD: 0 % (ref 0–7)
ERYTHROCYTE [DISTWIDTH] IN BLOOD BY AUTOMATED COUNT: 14.9 % (ref 11.5–14.5)
GLOBULIN SER CALC-MCNC: 3.2 G/DL (ref 2–4)
GLUCOSE SERPL-MCNC: 99 MG/DL (ref 65–100)
HCT VFR BLD AUTO: 40.2 % (ref 35–47)
HGB BLD-MCNC: 13.5 G/DL (ref 11.5–16)
IMM GRANULOCYTES # BLD AUTO: 0.1 K/UL (ref 0–0.04)
IMM GRANULOCYTES NFR BLD AUTO: 1 % (ref 0–0.5)
LYMPHOCYTES # BLD: 1.1 K/UL (ref 0.8–3.5)
LYMPHOCYTES NFR BLD: 12 % (ref 12–49)
MAGNESIUM SERPL-MCNC: 1.8 MG/DL (ref 1.6–2.4)
MCH RBC QN AUTO: 32 PG (ref 26–34)
MCHC RBC AUTO-ENTMCNC: 33.6 G/DL (ref 30–36.5)
MCV RBC AUTO: 95.3 FL (ref 80–99)
MONOCYTES # BLD: 1 K/UL (ref 0–1)
MONOCYTES NFR BLD: 11 % (ref 5–13)
NEUTS SEG # BLD: 7.3 K/UL (ref 1.8–8)
NEUTS SEG NFR BLD: 76 % (ref 32–75)
NRBC # BLD: 0 K/UL (ref 0–0.01)
NRBC BLD-RTO: 0 PER 100 WBC
PLATELET # BLD AUTO: 239 K/UL (ref 150–400)
PMV BLD AUTO: 8.8 FL (ref 8.9–12.9)
POTASSIUM SERPL-SCNC: 4.3 MMOL/L (ref 3.5–5.1)
PROT SERPL-MCNC: 6.6 G/DL (ref 6.4–8.2)
RBC # BLD AUTO: 4.22 M/UL (ref 3.8–5.2)
SODIUM SERPL-SCNC: 139 MMOL/L (ref 136–145)
TROPONIN I SERPL HS-MCNC: 6 NG/L (ref 0–51)
WBC # BLD AUTO: 9.5 K/UL (ref 3.6–11)

## 2024-11-04 PROCEDURE — 85379 FIBRIN DEGRADATION QUANT: CPT

## 2024-11-04 PROCEDURE — 99285 EMERGENCY DEPT VISIT HI MDM: CPT

## 2024-11-04 PROCEDURE — 71275 CT ANGIOGRAPHY CHEST: CPT

## 2024-11-04 PROCEDURE — 6370000000 HC RX 637 (ALT 250 FOR IP): Performed by: PHYSICIAN ASSISTANT

## 2024-11-04 PROCEDURE — 80053 COMPREHEN METABOLIC PANEL: CPT

## 2024-11-04 PROCEDURE — 36415 COLL VENOUS BLD VENIPUNCTURE: CPT

## 2024-11-04 PROCEDURE — 6360000002 HC RX W HCPCS: Performed by: PHYSICIAN ASSISTANT

## 2024-11-04 PROCEDURE — 94640 AIRWAY INHALATION TREATMENT: CPT

## 2024-11-04 PROCEDURE — 6360000004 HC RX CONTRAST MEDICATION: Performed by: PHYSICIAN ASSISTANT

## 2024-11-04 PROCEDURE — 96374 THER/PROPH/DIAG INJ IV PUSH: CPT

## 2024-11-04 PROCEDURE — 2580000003 HC RX 258: Performed by: PHYSICIAN ASSISTANT

## 2024-11-04 PROCEDURE — 84484 ASSAY OF TROPONIN QUANT: CPT

## 2024-11-04 PROCEDURE — 71046 X-RAY EXAM CHEST 2 VIEWS: CPT

## 2024-11-04 PROCEDURE — 85025 COMPLETE CBC W/AUTO DIFF WBC: CPT

## 2024-11-04 PROCEDURE — 83735 ASSAY OF MAGNESIUM: CPT

## 2024-11-04 RX ORDER — IPRATROPIUM BROMIDE AND ALBUTEROL SULFATE 2.5; .5 MG/3ML; MG/3ML
1 SOLUTION RESPIRATORY (INHALATION)
Status: COMPLETED | OUTPATIENT
Start: 2024-11-04 | End: 2024-11-04

## 2024-11-04 RX ORDER — AZITHROMYCIN 250 MG/1
TABLET, FILM COATED ORAL
Qty: 6 TABLET | Refills: 0 | Status: SHIPPED | OUTPATIENT
Start: 2024-11-04 | End: 2024-11-14

## 2024-11-04 RX ORDER — DOXYCYCLINE HYCLATE 100 MG
100 TABLET ORAL 2 TIMES DAILY
Qty: 20 TABLET | Refills: 0 | Status: SHIPPED | OUTPATIENT
Start: 2024-11-04 | End: 2024-11-14

## 2024-11-04 RX ORDER — IOPAMIDOL 755 MG/ML
100 INJECTION, SOLUTION INTRAVASCULAR
Status: COMPLETED | OUTPATIENT
Start: 2024-11-04 | End: 2024-11-04

## 2024-11-04 RX ADMIN — IPRATROPIUM BROMIDE AND ALBUTEROL SULFATE 1 DOSE: .5; 2.5 SOLUTION RESPIRATORY (INHALATION) at 13:19

## 2024-11-04 RX ADMIN — IOPAMIDOL 80 ML: 755 INJECTION, SOLUTION INTRAVENOUS at 12:52

## 2024-11-04 RX ADMIN — WATER 125 MG: 1 INJECTION INTRAMUSCULAR; INTRAVENOUS; SUBCUTANEOUS at 12:18

## 2024-11-04 RX ADMIN — IPRATROPIUM BROMIDE AND ALBUTEROL SULFATE 1 DOSE: .5; 2.5 SOLUTION RESPIRATORY (INHALATION) at 11:46

## 2024-11-04 ASSESSMENT — LIFESTYLE VARIABLES
HOW MANY STANDARD DRINKS CONTAINING ALCOHOL DO YOU HAVE ON A TYPICAL DAY: PATIENT DOES NOT DRINK
HOW OFTEN DO YOU HAVE A DRINK CONTAINING ALCOHOL: NEVER

## 2024-11-04 ASSESSMENT — PAIN - FUNCTIONAL ASSESSMENT: PAIN_FUNCTIONAL_ASSESSMENT: NONE - DENIES PAIN

## 2024-11-04 NOTE — ED TRIAGE NOTES
Pt arrives with c/o SOB x 2 weeks. Pt has COPD and has used inhalers and neb tx as well as steroids without relief.

## 2024-11-04 NOTE — ED PROVIDER NOTES
Given 11/4/24 1218)   iopamidol (ISOVUE-370) 76 % injection 100 mL (80 mLs IntraVENous Given 11/4/24 1252)   ipratropium 0.5 mg-albuterol 2.5 mg (DUONEB) nebulizer solution 1 Dose (1 Dose Inhalation Given 11/4/24 1319)       CONSULTS: (Who and What was discussed)  None    Chronic Conditions: COPD    Social Determinants affecting Dx or Tx: None    Records Reviewed (source and summary of external records): Nursing Notes, Old Medical Records, and Previous Laboratory Studies    MDM: CC/HPI Summary, DDx, ED Course, and Reassessment. Disposition Considerations (Tests not done, Shared Decision Making, Pt Expectation of Test or Tx.):   Patient is an 79-year-old female presenting to the emergency department increasing shortness of breath.  History of COPD/emphysema.  Here today afebrile, tachycardic, O2 sat 96%.  Decreased breath sounds throughout.  Slightly tachypneic.  Chest x-ray read as peribronchial cuffing which is likely related to acute or chronic bronchitis.  Elevated dimer.  Given her tachycardia recommended that we proceed with a CT scan to evaluate for any evidence of pneumonia as well as a PE.  Fortunately no evidence of a pulmonary embolism and findings consistent with emphysema.  I had ordered an EKG as well as a COVID however the patient would not like to proceed with these.  I had discussed admission with this patient given her COPD exacerbation and the fact that she is already on oral steroids and has breathing treatments with worsening of symptoms.  Patient would like to try antibiotics at home and states that she would return if she had any worsening of symptoms.  Certainly cannot force her to stay however strongly encouraged her to return.  She will follow-up with her pulmonologist [facility]       FINAL IMPRESSION     1. COPD exacerbation (HCC)          DISPOSITION/PLAN   DISPOSITION Decision To Discharge 11/04/2024 01:40:38 PM           Discharge Note: The patient is stable for discharge home. The signs,

## 2025-06-02 ENCOUNTER — HOSPITAL ENCOUNTER (EMERGENCY)
Facility: HOSPITAL | Age: 80
Discharge: HOME | End: 2025-06-02
Attending: EMERGENCY MEDICINE
Payer: MEDICARE

## 2025-06-02 VITALS
RESPIRATION RATE: 20 BRPM | WEIGHT: 130 LBS | TEMPERATURE: 98 F | OXYGEN SATURATION: 99 % | HEART RATE: 74 BPM | DIASTOLIC BLOOD PRESSURE: 84 MMHG | SYSTOLIC BLOOD PRESSURE: 185 MMHG | HEIGHT: 62 IN | BODY MASS INDEX: 23.92 KG/M2

## 2025-06-02 DIAGNOSIS — S81.811A SKIN TEAR OF RIGHT LOWER LEG WITHOUT COMPLICATION, INITIAL ENCOUNTER: Primary | ICD-10-CM

## 2025-06-02 PROCEDURE — 6360000002 HC RX W HCPCS: Performed by: EMERGENCY MEDICINE

## 2025-06-02 PROCEDURE — 6370000000 HC RX 637 (ALT 250 FOR IP): Performed by: EMERGENCY MEDICINE

## 2025-06-02 PROCEDURE — 99284 EMERGENCY DEPT VISIT MOD MDM: CPT

## 2025-06-02 PROCEDURE — 90715 TDAP VACCINE 7 YRS/> IM: CPT | Performed by: EMERGENCY MEDICINE

## 2025-06-02 PROCEDURE — 90471 IMMUNIZATION ADMIN: CPT | Performed by: EMERGENCY MEDICINE

## 2025-06-02 RX ORDER — CEPHALEXIN 500 MG/1
500 CAPSULE ORAL 4 TIMES DAILY
Qty: 28 CAPSULE | Refills: 0 | Status: SHIPPED | OUTPATIENT
Start: 2025-06-02 | End: 2025-06-09

## 2025-06-02 RX ORDER — BACITRACIN ZINC 500 [USP'U]/G
OINTMENT TOPICAL ONCE
Status: COMPLETED | OUTPATIENT
Start: 2025-06-02 | End: 2025-06-02

## 2025-06-02 RX ORDER — OXYCODONE HYDROCHLORIDE 5 MG/1
5 TABLET ORAL
Refills: 0 | Status: COMPLETED | OUTPATIENT
Start: 2025-06-02 | End: 2025-06-02

## 2025-06-02 RX ADMIN — BACITRACIN ZINC: 500 OINTMENT TOPICAL at 13:01

## 2025-06-02 RX ADMIN — TETANUS TOXOID, REDUCED DIPHTHERIA TOXOID AND ACELLULAR PERTUSSIS VACCINE, ADSORBED 0.5 ML: 5; 2.5; 8; 8; 2.5 SUSPENSION INTRAMUSCULAR at 13:04

## 2025-06-02 RX ADMIN — Medication 3 ML: at 12:22

## 2025-06-02 RX ADMIN — OXYCODONE 5 MG: 5 TABLET ORAL at 12:22

## 2025-06-02 ASSESSMENT — PAIN DESCRIPTION - DESCRIPTORS: DESCRIPTORS: TENDER

## 2025-06-02 ASSESSMENT — PAIN - FUNCTIONAL ASSESSMENT: PAIN_FUNCTIONAL_ASSESSMENT: 0-10

## 2025-06-02 ASSESSMENT — PAIN SCALES - GENERAL
PAINLEVEL_OUTOF10: 8
PAINLEVEL_OUTOF10: 4

## 2025-06-02 ASSESSMENT — PAIN DESCRIPTION - LOCATION: LOCATION: LEG

## 2025-06-02 ASSESSMENT — PAIN DESCRIPTION - ORIENTATION: ORIENTATION: RIGHT;LOWER

## 2025-06-02 NOTE — ED TRIAGE NOTES
Pt arrived with complaint of leg injury.  Pt reports, \" I was out in the garden and tripped over a metal fence and ripped my leg open\".  Pt is unsure of her tetanus.  Pt is awake alert and oriented X4,  pt taken to room 2 via wheelchair and pt was able to stand and pivot to stretcher without difficulty.  Pt and  educated on ER flow

## 2025-06-02 NOTE — ED PROVIDER NOTES
[facility] EMERGENCY DEPARTMENT  EMERGENCY DEPARTMENT ENCOUNTER       Pt Name: Shanice Espinoza  MRN: [id number]  Birthdate 1945  Date of evaluation: 6/2/2025  Provider: Kimberlee Hussein MD   PCP: Kate Brito MD  Note Started: 1:55 PM EDT 6/2/25     CHIEF COMPLAINT       Chief Complaint   Patient presents with    Laceration     Right lower leg        HISTORY OF PRESENT ILLNESS: 1 or more elements      History From: Patient, History limited by: none     Shanice Espinoza is a 80 y.o. female presents to ED complaining of scrape to right leg that occurred while she was gardening.  No other injuries.  Gauze applied prior to arrival.       Please See MDM for Additional Details of the HPI/PMH  Nursing Notes were all reviewed and agreed with or any disagreements were addressed in the HPI.     REVIEW OF SYSTEMS        Positives and Pertinent negatives as per HPI.    PAST HISTORY     Past Medical History:  Past Medical History:   Diagnosis Date    Chronic obstructive pulmonary disease (HCC)     Environmental and seasonal allergies     GERD (gastroesophageal reflux disease)     Herpes     Genital    Hx of colonoscopy 05/22/2017    Hypertension     OA (osteoarthritis)        Past Surgical History:  Past Surgical History:   Procedure Laterality Date    BREAST SURGERY      CATARACT REMOVAL Bilateral     [place]  [place]/2017    [place]    COLONOSCOPY  2005    [facility]    IMPLANT BREAST SILICONE/EQ      ORTHOPEDIC SURGERY      right hand fibroma    ORTHOPEDIC SURGERY Left 10/14/2016    left foot planter fibroma    TUBAL LIGATION      US I&D BREAST ABSCESS DEEP  9/10/2020    US I&D BREAST ABSCESS DEEP       Family History:  Family History   Problem Relation Age of Onset    Heart Disease Father     Hypertension Mother     Liver Disease Brother         Hepatitis C    Heart Failure Brother        Social History:  Social History     Tobacco Use    Smoking status: Former     Current packs/day: 0.00     Types:

## 2025-06-02 NOTE — DISCHARGE INSTRUCTIONS
Please keep your skin clean and dry.  Avoid using hydrogen peroxide or alcohol on wound.  After cleaning with soap and water, pat dry.  Apply a thin layer of Vaseline or bacitracin and a nonstick dressing.     Come back to the emergency department immediately for any new or worsening symptoms, especially signs of infection.

## 2025-06-04 ENCOUNTER — HOSPITAL ENCOUNTER (EMERGENCY)
Facility: HOSPITAL | Age: 80
Discharge: HOME | End: 2025-06-04
Attending: EMERGENCY MEDICINE
Payer: MEDICARE

## 2025-06-04 ENCOUNTER — APPOINTMENT (OUTPATIENT)
Facility: HOSPITAL | Age: 80
End: 2025-06-04
Payer: MEDICARE

## 2025-06-04 VITALS
OXYGEN SATURATION: 96 % | SYSTOLIC BLOOD PRESSURE: 205 MMHG | HEART RATE: 93 BPM | TEMPERATURE: 97.7 F | HEIGHT: 62 IN | WEIGHT: 130 LBS | RESPIRATION RATE: 17 BRPM | BODY MASS INDEX: 23.92 KG/M2 | DIASTOLIC BLOOD PRESSURE: 87 MMHG

## 2025-06-04 DIAGNOSIS — M54.2 NECK PAIN: ICD-10-CM

## 2025-06-04 DIAGNOSIS — S16.1XXA STRAIN OF NECK MUSCLE, INITIAL ENCOUNTER: Primary | ICD-10-CM

## 2025-06-04 PROCEDURE — 72125 CT NECK SPINE W/O DYE: CPT

## 2025-06-04 PROCEDURE — 6370000000 HC RX 637 (ALT 250 FOR IP): Performed by: EMERGENCY MEDICINE

## 2025-06-04 PROCEDURE — 96372 THER/PROPH/DIAG INJ SC/IM: CPT

## 2025-06-04 PROCEDURE — 6360000002 HC RX W HCPCS: Performed by: EMERGENCY MEDICINE

## 2025-06-04 PROCEDURE — 99284 EMERGENCY DEPT VISIT MOD MDM: CPT

## 2025-06-04 RX ORDER — LIDOCAINE 4 G/G
1 PATCH TOPICAL
Status: DISCONTINUED | OUTPATIENT
Start: 2025-06-04 | End: 2025-06-04 | Stop reason: HOSPADM

## 2025-06-04 RX ORDER — ONDANSETRON 4 MG/1
4 TABLET, ORALLY DISINTEGRATING ORAL 3 TIMES DAILY PRN
Qty: 21 TABLET | Refills: 0 | Status: SHIPPED | OUTPATIENT
Start: 2025-06-04

## 2025-06-04 RX ORDER — METHYLPREDNISOLONE 4 MG/1
TABLET ORAL
Qty: 1 KIT | Refills: 0 | Status: SHIPPED | OUTPATIENT
Start: 2025-06-04 | End: 2025-06-10

## 2025-06-04 RX ORDER — ONDANSETRON 4 MG/1
4 TABLET, ORALLY DISINTEGRATING ORAL ONCE
Status: COMPLETED | OUTPATIENT
Start: 2025-06-04 | End: 2025-06-04

## 2025-06-04 RX ORDER — KETOROLAC TROMETHAMINE 30 MG/ML
30 INJECTION, SOLUTION INTRAMUSCULAR; INTRAVENOUS
Status: COMPLETED | OUTPATIENT
Start: 2025-06-04 | End: 2025-06-04

## 2025-06-04 RX ORDER — HYDROCODONE BITARTRATE AND ACETAMINOPHEN 5; 325 MG/1; MG/1
1 TABLET ORAL
Refills: 0 | Status: DISCONTINUED | OUTPATIENT
Start: 2025-06-04 | End: 2025-06-04

## 2025-06-04 RX ORDER — METHOCARBAMOL 750 MG/1
750 TABLET, FILM COATED ORAL 4 TIMES DAILY
Qty: 40 TABLET | Refills: 0 | Status: SHIPPED | OUTPATIENT
Start: 2025-06-04 | End: 2025-06-14

## 2025-06-04 RX ORDER — METHOCARBAMOL 500 MG/1
750 TABLET, FILM COATED ORAL ONCE
Status: COMPLETED | OUTPATIENT
Start: 2025-06-04 | End: 2025-06-04

## 2025-06-04 RX ADMIN — ONDANSETRON 4 MG: 4 TABLET, ORALLY DISINTEGRATING ORAL at 12:58

## 2025-06-04 RX ADMIN — KETOROLAC TROMETHAMINE 30 MG: 30 INJECTION, SOLUTION INTRAMUSCULAR at 12:59

## 2025-06-04 RX ADMIN — METHOCARBAMOL 750 MG: 500 TABLET ORAL at 12:56

## 2025-06-04 ASSESSMENT — PAIN - FUNCTIONAL ASSESSMENT
PAIN_FUNCTIONAL_ASSESSMENT: 0-10
PAIN_FUNCTIONAL_ASSESSMENT: 0-10

## 2025-06-04 ASSESSMENT — PAIN SCALES - GENERAL
PAINLEVEL_OUTOF10: 9
PAINLEVEL_OUTOF10: 10

## 2025-06-04 NOTE — ED PROVIDER NOTES
[facility] EMERGENCY DEPARTMENT  EMERGENCY DEPARTMENT ENCOUNTER       Pt Name: Shanice Espinoza  MRN: [id number]  Birthdate 1945  Date of evaluation: 2025  Provider: Jamie Thacker DO   PCP: Kate Brito MD  Note Started: 12:38 PM EDT 25     CHIEF COMPLAINT       Chief Complaint   Patient presents with    Neck Pain        HISTORY OF PRESENT ILLNESS: 1 or more elements      History From: Patient, History limited by: none     Shanice Espinoza is a 80 y.o. female presents to the emergency department for evaluation of left-sided neck pain.       Please See MDM for Additional Details of the HPI/PMH  Nursing Notes were all reviewed and agreed with or any disagreements were addressed in the HPI.     REVIEW OF SYSTEMS        Positives and Pertinent negatives as per HPI.    PAST HISTORY     Past Medical History:  Past Medical History:   Diagnosis Date    Chronic obstructive pulmonary disease (HCC)     Environmental and seasonal allergies     GERD (gastroesophageal reflux disease)     Herpes     Genital    Hx of colonoscopy 2017    Hypertension     OA (osteoarthritis)        Past Surgical History:  Past Surgical History:   Procedure Laterality Date    BREAST SURGERY      CATARACT REMOVAL Bilateral     [place]OSCOPY  2017    [place]    COLONOSCOPY  2005    [facility]    IMPLANT BREAST SILICONE/EQ      ORTHOPEDIC SURGERY      right hand fibroma    ORTHOPEDIC SURGERY Left 10/14/2016    left foot planter fibroma    TUBAL LIGATION      US I&D BREAST ABSCESS DEEP  9/10/2020    US I&D BREAST ABSCESS DEEP       Family History:  Family History   Problem Relation Age of Onset    Heart Disease Father     Hypertension Mother     Liver Disease Brother         Hepatitis C    Heart Failure Brother        Social History:  Social History     Tobacco Use    Smoking status: Former     Current packs/day: 0.00     Types: Cigarettes     Quit date: 1995     Years since quittin.0    Smokeless tobacco:  known as: FLONASE     losartan 50 MG tablet  Commonly known as: COZAAR     metoprolol tartrate 50 MG tablet  Commonly known as: LOPRESSOR  Take 1 tablet by mouth 2 times daily     montelukast 10 MG tablet  Commonly known as: ZOIE     Gavin Ellipta 100-62.5-25 MCG/ACT Aepb inhaler  Generic drug: fluticasone-umeclidin-vilant  Inhale 1 puff into the lungs daily     valACYclovir 500 MG tablet  Commonly known as: VALTREX     zolpidem 5 MG tablet  Commonly known as: AMBIEN               Where to Get Your Medications        These medications were sent to [facility]/pharmacy #[id number] - [place], [place] - [address] [facility] - P [phone] - F [phone]  [address] [facility] [address]      Phone: [phone]   methocarbamol 750 MG tablet  methylPREDNISolone 4 MG tablet  ondansetron 4 MG disintegrating tablet           DISCONTINUED MEDICATIONS:  Current Discharge Medication List          I am the Primary Clinician of Record.   Jamie Thacker DO (electronically signed)    (Please note that parts of this dictation were completed with voice recognition software. Quite often unanticipated grammatical, syntax, homophones, and other interpretive errors are inadvertently transcribed by the computer software. Please disregards these errors. Please excuse any errors that have escaped final proofreading.)          Jamie Thacker DO  06/04/25 2722

## 2025-06-04 NOTE — DISCHARGE INSTRUCTIONS
Over-the-counter Salonpas they do make some that are stickier than others.  These can be applied for 12 hours off 12 hours.    Would recommend over-the-counter Voltaren gel which can be applied topically 3 times a day to the area of pain and discomfort.

## 2025-06-16 ENCOUNTER — APPOINTMENT (OUTPATIENT)
Facility: HOSPITAL | Age: 80
End: 2025-06-16
Payer: MEDICARE

## 2025-06-16 ENCOUNTER — HOSPITAL ENCOUNTER (EMERGENCY)
Facility: HOSPITAL | Age: 80
Discharge: HOME | End: 2025-06-16
Attending: FAMILY MEDICINE | Admitting: FAMILY MEDICINE
Payer: MEDICARE

## 2025-06-16 VITALS
HEART RATE: 95 BPM | OXYGEN SATURATION: 98 % | HEIGHT: 62 IN | DIASTOLIC BLOOD PRESSURE: 66 MMHG | SYSTOLIC BLOOD PRESSURE: 168 MMHG | WEIGHT: 130 LBS | BODY MASS INDEX: 23.92 KG/M2 | TEMPERATURE: 97.9 F | RESPIRATION RATE: 16 BRPM

## 2025-06-16 DIAGNOSIS — R42 DIZZINESS: Primary | ICD-10-CM

## 2025-06-16 DIAGNOSIS — E86.0 DEHYDRATION: ICD-10-CM

## 2025-06-16 LAB
ALBUMIN SERPL-MCNC: 3.5 G/DL (ref 3.5–5)
ALBUMIN/GLOB SERPL: 1.1 (ref 1.1–2.2)
ALP SERPL-CCNC: 109 U/L (ref 45–117)
ALT SERPL-CCNC: 90 U/L (ref 12–78)
ANION GAP SERPL CALC-SCNC: 13 MMOL/L (ref 2–12)
AST SERPL-CCNC: 36 U/L (ref 15–37)
BASOPHILS # BLD: 0.05 K/UL (ref 0–0.1)
BASOPHILS NFR BLD: 0.4 % (ref 0–1)
BILIRUB SERPL-MCNC: 0.8 MG/DL (ref 0.2–1)
BUN SERPL-MCNC: 19 MG/DL (ref 6–20)
BUN/CREAT SERPL: 19 (ref 12–20)
CALCIUM SERPL-MCNC: 9.2 MG/DL (ref 8.5–10.1)
CHLORIDE SERPL-SCNC: 105 MMOL/L (ref 97–108)
CO2 SERPL-SCNC: 25 MMOL/L (ref 21–32)
CREAT SERPL-MCNC: 0.99 MG/DL (ref 0.55–1.02)
DIFFERENTIAL METHOD BLD: ABNORMAL
EKG ATRIAL RATE: 119 BPM
EKG DIAGNOSIS: NORMAL
EKG P AXIS: 33 DEGREES
EKG P-R INTERVAL: 136 MS
EKG Q-T INTERVAL: 312 MS
EKG QRS DURATION: 74 MS
EKG QTC CALCULATION (BAZETT): 438 MS
EKG R AXIS: -28 DEGREES
EKG T AXIS: 27 DEGREES
EKG VENTRICULAR RATE: 119 BPM
EOSINOPHIL # BLD: 0.27 K/UL (ref 0–0.4)
EOSINOPHIL NFR BLD: 2.1 % (ref 0–0.7)
ERYTHROCYTE [DISTWIDTH] IN BLOOD BY AUTOMATED COUNT: 12.7 % (ref 11.5–14.5)
GLOBULIN SER CALC-MCNC: 3.3 G/DL (ref 2–4)
GLUCOSE SERPL-MCNC: 97 MG/DL (ref 65–100)
HCT VFR BLD AUTO: 43.3 % (ref 35–47)
HGB BLD-MCNC: 14.5 G/DL (ref 11.5–16)
IMM GRANULOCYTES # BLD AUTO: 0.09 K/UL (ref 0–0.04)
IMM GRANULOCYTES NFR BLD AUTO: 0.7 % (ref 0–0.5)
LYMPHOCYTES # BLD: 2.45 K/UL (ref 0.8–3.5)
LYMPHOCYTES NFR BLD: 19.3 % (ref 12–49)
MCH RBC QN AUTO: 34.6 PG (ref 26–34)
MCHC RBC AUTO-ENTMCNC: 33.5 G/DL (ref 30–36.5)
MCV RBC AUTO: 103.3 FL (ref 80–99)
MONOCYTES # BLD: 0.91 K/UL (ref 0–1)
MONOCYTES NFR BLD: 7.2 % (ref 5–13)
NEUTS SEG # BLD: 8.94 K/UL (ref 1.8–8)
NEUTS SEG NFR BLD: 70.3 % (ref 32–75)
NRBC # BLD: 0 K/UL (ref 0–0.01)
NRBC BLD-RTO: 0 PER 100 WBC
PLATELET # BLD AUTO: 261 K/UL (ref 150–400)
PMV BLD AUTO: 9.1 FL (ref 8.9–12.9)
POTASSIUM SERPL-SCNC: 3.6 MMOL/L (ref 3.5–5.1)
PROT SERPL-MCNC: 6.8 G/DL (ref 6.4–8.2)
RBC # BLD AUTO: 4.19 M/UL (ref 3.8–5.2)
SODIUM SERPL-SCNC: 143 MMOL/L (ref 136–145)
WBC # BLD AUTO: 12.7 K/UL (ref 3.6–11)

## 2025-06-16 PROCEDURE — 70551 MRI BRAIN STEM W/O DYE: CPT

## 2025-06-16 PROCEDURE — 96374 THER/PROPH/DIAG INJ IV PUSH: CPT

## 2025-06-16 PROCEDURE — 6370000000 HC RX 637 (ALT 250 FOR IP): Performed by: FAMILY MEDICINE

## 2025-06-16 PROCEDURE — 2580000003 HC RX 258: Performed by: FAMILY MEDICINE

## 2025-06-16 PROCEDURE — 99285 EMERGENCY DEPT VISIT HI MDM: CPT

## 2025-06-16 PROCEDURE — 70450 CT HEAD/BRAIN W/O DYE: CPT

## 2025-06-16 PROCEDURE — 36415 COLL VENOUS BLD VENIPUNCTURE: CPT

## 2025-06-16 PROCEDURE — 6360000002 HC RX W HCPCS: Performed by: FAMILY MEDICINE

## 2025-06-16 PROCEDURE — 96361 HYDRATE IV INFUSION ADD-ON: CPT

## 2025-06-16 PROCEDURE — 71046 X-RAY EXAM CHEST 2 VIEWS: CPT

## 2025-06-16 PROCEDURE — 85025 COMPLETE CBC W/AUTO DIFF WBC: CPT

## 2025-06-16 PROCEDURE — 80053 COMPREHEN METABOLIC PANEL: CPT

## 2025-06-16 RX ORDER — DIAZEPAM 5 MG/1
5 TABLET ORAL ONCE
Status: DISCONTINUED | OUTPATIENT
Start: 2025-06-16 | End: 2025-06-16

## 2025-06-16 RX ORDER — MECLIZINE HYDROCHLORIDE 25 MG/1
25 TABLET ORAL 3 TIMES DAILY PRN
Qty: 30 TABLET | Refills: 0 | Status: SHIPPED | OUTPATIENT
Start: 2025-06-16 | End: 2025-06-26

## 2025-06-16 RX ORDER — MIDAZOLAM HYDROCHLORIDE 1 MG/ML
2 INJECTION, SOLUTION INTRAMUSCULAR; INTRAVENOUS ONCE
Status: COMPLETED | OUTPATIENT
Start: 2025-06-16 | End: 2025-06-16

## 2025-06-16 RX ORDER — MECLIZINE HYDROCHLORIDE 25 MG/1
25 TABLET ORAL
Status: COMPLETED | OUTPATIENT
Start: 2025-06-16 | End: 2025-06-16

## 2025-06-16 RX ORDER — 0.9 % SODIUM CHLORIDE 0.9 %
1000 INTRAVENOUS SOLUTION INTRAVENOUS ONCE
Status: COMPLETED | OUTPATIENT
Start: 2025-06-16 | End: 2025-06-16

## 2025-06-16 RX ADMIN — SODIUM CHLORIDE 1000 ML: 0.9 INJECTION, SOLUTION INTRAVENOUS at 14:29

## 2025-06-16 RX ADMIN — MIDAZOLAM HYDROCHLORIDE 2 MG: 1 INJECTION, SOLUTION INTRAMUSCULAR; INTRAVENOUS at 14:44

## 2025-06-16 RX ADMIN — MECLIZINE HYDROCHLORIDE 25 MG: 25 TABLET ORAL at 14:44

## 2025-06-16 ASSESSMENT — PAIN DESCRIPTION - LOCATION: LOCATION: HEAD

## 2025-06-16 ASSESSMENT — PAIN DESCRIPTION - DESCRIPTORS: DESCRIPTORS: PRESSURE

## 2025-06-16 ASSESSMENT — PAIN - FUNCTIONAL ASSESSMENT: PAIN_FUNCTIONAL_ASSESSMENT: 0-10

## 2025-06-16 ASSESSMENT — PAIN SCALES - GENERAL: PAINLEVEL_OUTOF10: 3

## 2025-06-16 ASSESSMENT — PAIN DESCRIPTION - ORIENTATION: ORIENTATION: POSTERIOR

## 2025-06-16 NOTE — ED TRIAGE NOTES
Pt arrived with complaint of dizziness.  Pt reports she fell 2 weeks ago and since then has had dizziness with a headache and feeling off balance.  Pt reports she was her PMDs office Dr. Aviles and she cleaned the wound on her right lower leg that occurred when she fell 2 weeks ago, while in the office they noted pts heart rate was elevated into the 130's and and EKG was done showing Sinus Tachycardia rate of 129.  Pt is awake alert and oriented X 4, pt to room 1 via W/C pt with one person assisted to get out of W/C and into the stretcher.  Pt educated on ER flow

## 2025-06-16 NOTE — DISCHARGE INSTRUCTIONS
--Make sure to drink plenty of fluids.  --Try meclizine 25 mg every 6 hours as needed for dizziness. This is what you were given in the ED, along with fluids.

## 2025-06-17 NOTE — ED PROVIDER NOTES
1224]   Encounter Vitals Group      BP (!) 196/102      Systolic BP Percentile       Diastolic BP Percentile       Pulse (!) 118      Respirations 20      Temp 97.9 °F (36.6 °C)      Temp Source Oral      SpO2 98 %      Weight - Scale 59 kg (130 lb)      Height 1.575 m (5' 2\")      Head Circumference       Peak Flow       Pain Score       Pain Loc       Pain Education       Exclude from Growth Chart            Physical Exam  Vitals reviewed.   Constitutional:       Appearance: Normal appearance.   HENT:      Head: Normocephalic and atraumatic.      Right Ear: External ear normal.      Left Ear: External ear normal.      Nose: Nose normal.      Mouth/Throat:      Mouth: Mucous membranes are moist.   Eyes:      Extraocular Movements: Extraocular movements intact.      Pupils: Pupils are equal, round, and reactive to light.   Cardiovascular:      Rate and Rhythm: Normal rate and regular rhythm.      Heart sounds: Normal heart sounds. No murmur heard.  Pulmonary:      Effort: Pulmonary effort is normal.      Breath sounds: Normal breath sounds. No wheezing or rales.   Chest:      Chest wall: No tenderness.   Abdominal:      General: Abdomen is flat.      Palpations: Abdomen is soft.      Tenderness: There is no abdominal tenderness. There is no guarding.   Musculoskeletal:         General: No swelling or signs of injury. Normal range of motion.      Cervical back: Normal range of motion and neck supple. No tenderness.   Skin:     General: Skin is warm and dry.      Comments: Right leg with ace, bandage about the proximal aspect.    Tender. Distal calf, ankle without swelling.    Neurological:      Mental Status: She is alert and oriented to person, place, and time.      Cranial Nerves: No cranial nerve deficit.      Sensory: No sensory deficit.      Motor: No weakness.      Coordination: Coordination normal.      Gait: Gait normal.      Comments: Bilateral nystagmus   Psychiatric:         Behavior: Behavior normal.

## 2025-06-26 LAB
EKG ATRIAL RATE: 119 BPM
EKG DIAGNOSIS: NORMAL
EKG P AXIS: 33 DEGREES
EKG P-R INTERVAL: 136 MS
EKG Q-T INTERVAL: 312 MS
EKG QRS DURATION: 74 MS
EKG QTC CALCULATION (BAZETT): 438 MS
EKG R AXIS: -28 DEGREES
EKG T AXIS: 27 DEGREES
EKG VENTRICULAR RATE: 119 BPM

## 2025-06-30 ENCOUNTER — HOSPITAL ENCOUNTER (EMERGENCY)
Facility: HOSPITAL | Age: 80
Discharge: ANOTHER HOSPITAL | End: 2025-06-30
Attending: EMERGENCY MEDICINE
Payer: MEDICARE

## 2025-06-30 ENCOUNTER — APPOINTMENT (OUTPATIENT)
Facility: HOSPITAL | Age: 80
End: 2025-06-30
Payer: MEDICARE

## 2025-06-30 VITALS
DIASTOLIC BLOOD PRESSURE: 61 MMHG | HEART RATE: 92 BPM | OXYGEN SATURATION: 93 % | SYSTOLIC BLOOD PRESSURE: 151 MMHG | TEMPERATURE: 98.2 F | RESPIRATION RATE: 16 BRPM
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
## 2025-06-30 DIAGNOSIS — S82.841A CLOSED BIMALLEOLAR FRACTURE OF RIGHT ANKLE, INITIAL ENCOUNTER: Primary | ICD-10-CM

## 2025-06-30 LAB
ALBUMIN SERPL-MCNC: 3.1 G/DL (ref 3.5–5)
ALBUMIN/GLOB SERPL: 1 (ref 1.1–2.2)
ALP SERPL-CCNC: 92 U/L (ref 45–117)
ALT SERPL-CCNC: 34 U/L (ref 12–78)
ANION GAP SERPL CALC-SCNC: 13 MMOL/L (ref 2–12)
AST SERPL-CCNC: 23 U/L (ref 15–37)
BASOPHILS # BLD: 0.03 K/UL (ref 0–0.1)
BASOPHILS NFR BLD: 0.5 % (ref 0–1)
BILIRUB SERPL-MCNC: 1.1 MG/DL (ref 0.2–1)
BUN SERPL-MCNC: 13 MG/DL (ref 6–20)
BUN/CREAT SERPL: 14 (ref 12–20)
CALCIUM SERPL-MCNC: 9.5 MG/DL (ref 8.5–10.1)
CHLORIDE SERPL-SCNC: 102 MMOL/L (ref 97–108)
CO2 SERPL-SCNC: 25 MMOL/L (ref 21–32)
CREAT SERPL-MCNC: 0.94 MG/DL (ref 0.55–1.02)
DIFFERENTIAL METHOD BLD: ABNORMAL
EOSINOPHIL # BLD: 0.09 K/UL (ref 0–0.4)
EOSINOPHIL NFR BLD: 1.4 % (ref 0–0.7)
ERYTHROCYTE [DISTWIDTH] IN BLOOD BY AUTOMATED COUNT: 12.1 % (ref 11.5–14.5)
GLOBULIN SER CALC-MCNC: 3.2 G/DL (ref 2–4)
GLUCOSE SERPL-MCNC: 91 MG/DL (ref 65–100)
HCT VFR BLD AUTO: 39 % (ref 35–47)
HGB BLD-MCNC: 12.8 G/DL (ref 11.5–16)
IMM GRANULOCYTES # BLD AUTO: 0.03 K/UL (ref 0–0.04)
IMM GRANULOCYTES NFR BLD AUTO: 0.5 % (ref 0–0.5)
LYMPHOCYTES # BLD: 0.92 K/UL (ref 0.8–3.5)
LYMPHOCYTES NFR BLD: 14 % (ref 12–49)
MCH RBC QN AUTO: 33.8 PG (ref 26–34)
MCHC RBC AUTO-ENTMCNC: 32.8 G/DL (ref 30–36.5)
MCV RBC AUTO: 102.9 FL (ref 80–99)
MONOCYTES # BLD: 0.73 K/UL (ref 0–1)
MONOCYTES NFR BLD: 11.1 % (ref 5–13)
NEUTS SEG # BLD: 4.78 K/UL (ref 1.8–8)
NEUTS SEG NFR BLD: 72.5 % (ref 32–75)
NRBC # BLD: 0 K/UL (ref 0–0.01)
NRBC BLD-RTO: 0 PER 100 WBC
PLATELET # BLD AUTO: 197 K/UL (ref 150–400)
POTASSIUM SERPL-SCNC: 3.9 MMOL/L (ref 3.5–5.1)
PROT SERPL-MCNC: 6.3 G/DL (ref 6.4–8.2)
RBC # BLD AUTO: 3.79 M/UL (ref 3.8–5.2)
SODIUM SERPL-SCNC: 140 MMOL/L (ref 136–145)
WBC # BLD AUTO: 6.6 K/UL (ref 3.6–11)

## 2025-06-30 PROCEDURE — 96374 THER/PROPH/DIAG INJ IV PUSH: CPT

## 2025-06-30 PROCEDURE — 73610 X-RAY EXAM OF ANKLE: CPT

## 2025-06-30 PROCEDURE — 70450 CT HEAD/BRAIN W/O DYE: CPT

## 2025-06-30 PROCEDURE — 72125 CT NECK SPINE W/O DYE: CPT

## 2025-06-30 PROCEDURE — 72100 X-RAY EXAM L-S SPINE 2/3 VWS: CPT

## 2025-06-30 PROCEDURE — 85025 COMPLETE CBC W/AUTO DIFF WBC: CPT

## 2025-06-30 PROCEDURE — 99285 EMERGENCY DEPT VISIT HI MDM: CPT

## 2025-06-30 PROCEDURE — 36415 COLL VENOUS BLD VENIPUNCTURE: CPT

## 2025-06-30 PROCEDURE — 96375 TX/PRO/DX INJ NEW DRUG ADDON: CPT

## 2025-06-30 PROCEDURE — 6360000002 HC RX W HCPCS: Performed by: EMERGENCY MEDICINE

## 2025-06-30 PROCEDURE — 93005 ELECTROCARDIOGRAM TRACING: CPT | Performed by: EMERGENCY MEDICINE

## 2025-06-30 PROCEDURE — 6370000000 HC RX 637 (ALT 250 FOR IP): Performed by: EMERGENCY MEDICINE

## 2025-06-30 PROCEDURE — 80053 COMPREHEN METABOLIC PANEL: CPT

## 2025-06-30 RX ORDER — OXYCODONE HYDROCHLORIDE 5 MG/1
5 TABLET ORAL
Refills: 0 | Status: COMPLETED | OUTPATIENT
Start: 2025-06-30 | End: 2025-06-30

## 2025-06-30 RX ORDER — ONDANSETRON 2 MG/ML
4 INJECTION INTRAMUSCULAR; INTRAVENOUS ONCE
Status: COMPLETED | OUTPATIENT
Start: 2025-06-30 | End: 2025-06-30

## 2025-06-30 RX ORDER — MORPHINE SULFATE 4 MG/ML
4 INJECTION, SOLUTION INTRAMUSCULAR; INTRAVENOUS
Status: COMPLETED | OUTPATIENT
Start: 2025-06-30 | End: 2025-06-30

## 2025-06-30 RX ADMIN — OXYCODONE 5 MG: 5 TABLET ORAL at 09:08

## 2025-06-30 RX ADMIN — ONDANSETRON 4 MG: 2 INJECTION, SOLUTION INTRAMUSCULAR; INTRAVENOUS at 11:35

## 2025-06-30 RX ADMIN — MORPHINE SULFATE 4 MG: 4 INJECTION INTRAVENOUS at 11:36

## 2025-06-30 ASSESSMENT — PAIN SCALES - GENERAL
PAINLEVEL_OUTOF10: 7
PAINLEVEL_OUTOF10: 8

## 2025-06-30 ASSESSMENT — PAIN - FUNCTIONAL ASSESSMENT: PAIN_FUNCTIONAL_ASSESSMENT: 0-10

## 2025-06-30 ASSESSMENT — PAIN DESCRIPTION - LOCATION: LOCATION: BACK

## 2025-06-30 NOTE — ED PROVIDER NOTES
(1b): Answers both correctly  LOC Commands (1c): Performs both tasks correctly  Best Gaze (2): Normal  Visual (3): No visual loss  Facial Palsy (4): Normal symmetrical movement  Motor Arm, Left (5a): No drift  Motor Arm, Right (5b): No drift  Motor Leg, Left (6a): No drift  Motor Leg, Right (6b): No drift  Limb Ataxia (7): Absent  Sensory (8): Normal  Best Language (9): No aphasia  Dysarthria (10): Normal  Extinction and Inattention (11): No abnormality  Total: 0     PHYSICAL EXAM      ED Triage Vitals [06/30/25 0656]   Encounter Vitals Group      BP (!) 151/61      Systolic BP Percentile       Diastolic BP Percentile       Pulse 92      Respirations 16      Temp 98.2 °F (36.8 °C)      Temp Source Oral      SpO2 93 %      Weight       Height       Head Circumference       Peak Flow       Pain Score       Pain Loc       Pain Education       Exclude from Growth Chart         Physical Exam  Constitutional:       Appearance: Normal appearance.   Cardiovascular:      Rate and Rhythm: Normal rate and regular rhythm.   Pulmonary:      Effort: Pulmonary effort is normal.      Breath sounds: Normal breath sounds.   Abdominal:      Palpations: Abdomen is soft.      Tenderness: There is no abdominal tenderness. There is no guarding.   Musculoskeletal:      Comments: Tenderness and bruising to right lateral malleolus and medial malleolus.  Normal DP pulse.  No midline spinal tenderness.  Strength and sensation intact in bilateral lower extremities.  Left lower extremity with healing skin wound on shin, no surrounding erythema or purulent drainage.   Skin:     General: Skin is warm.   Neurological:      Mental Status: She is alert and oriented to person, place, and time.          DIAGNOSTIC RESULTS   LABS:     Recent Results (from the past 24 hours)   EKG 12 Lead    Collection Time: 06/30/25  8:33 AM   Result Value Ref Range    Ventricular Rate 84 BPM    Atrial Rate 84 BPM    P-R Interval 156 ms    QRS Duration 84 ms    Q-T

## 2025-06-30 NOTE — ED NOTES
Pt  reports that pt is \"not acting right\" he reports that she is \"seeing things like pink lights and pink walls\". RN to bedside and MD aware.

## 2025-06-30 NOTE — ED NOTES
Patients  called nurse to bedside concerned as the patient is stating \"the light is pink with yellow stripes\". This RN performed NIH at bedside,  NIH= 0 at this time. MD responded to bedside at 0817.

## 2025-06-30 NOTE — PROGRESS NOTES
.    arranged BLS transportation from [facility] ed 6  to [facility] ED  .  Arranged BLS transport w/[facility] -  advised to bring appropiate equipment - ETA of  1100  minutes given - updated ED staff w/ETA

## 2025-07-01 LAB
EKG ATRIAL RATE: 84 BPM
EKG DIAGNOSIS: NORMAL
EKG P AXIS: 44 DEGREES
EKG P-R INTERVAL: 156 MS
EKG Q-T INTERVAL: 386 MS
EKG QRS DURATION: 84 MS
EKG QTC CALCULATION (BAZETT): 456 MS
EKG R AXIS: -19 DEGREES
EKG T AXIS: 18 DEGREES
EKG VENTRICULAR RATE: 84 BPM